# Patient Record
Sex: FEMALE | Race: WHITE | NOT HISPANIC OR LATINO | Employment: OTHER | URBAN - METROPOLITAN AREA
[De-identification: names, ages, dates, MRNs, and addresses within clinical notes are randomized per-mention and may not be internally consistent; named-entity substitution may affect disease eponyms.]

---

## 2017-01-31 ENCOUNTER — RESULTS ENCOUNTER (OUTPATIENT)
Dept: INTERNAL MEDICINE | Facility: CLINIC | Age: 67
End: 2017-01-31

## 2017-01-31 DIAGNOSIS — E78.00 HYPERCHOLESTEROLEMIA: ICD-10-CM

## 2017-02-04 ENCOUNTER — HOSPITAL ENCOUNTER (EMERGENCY)
Facility: HOSPITAL | Age: 67
Discharge: HOME | End: 2017-02-04
Attending: EMERGENCY MEDICINE | Admitting: EMERGENCY MEDICINE

## 2017-02-04 ENCOUNTER — RESULTS ENCOUNTER (OUTPATIENT)
Dept: INTERNAL MEDICINE | Facility: CLINIC | Age: 67
End: 2017-02-04

## 2017-02-04 ENCOUNTER — APPOINTMENT (OUTPATIENT)
Dept: CT IMAGING | Facility: HOSPITAL | Age: 67
End: 2017-02-04

## 2017-02-04 VITALS
DIASTOLIC BLOOD PRESSURE: 107 MMHG | OXYGEN SATURATION: 99 % | BODY MASS INDEX: 25.71 KG/M2 | HEIGHT: 66 IN | HEART RATE: 69 BPM | WEIGHT: 160 LBS | TEMPERATURE: 98.3 F | RESPIRATION RATE: 18 BRPM | SYSTOLIC BLOOD PRESSURE: 168 MMHG

## 2017-02-04 DIAGNOSIS — K57.32 DIVERTICULITIS LARGE INTESTINE W/O PERFORATION OR ABSCESS W/O BLEEDING: Primary | ICD-10-CM

## 2017-02-04 DIAGNOSIS — Z00.00 HEALTH CARE MAINTENANCE: ICD-10-CM

## 2017-02-04 DIAGNOSIS — K57.92 DIVERTICULITIS OF INTESTINE WITHOUT PERFORATION OR ABSCESS WITHOUT BLEEDING, UNSPECIFIED PART OF INTESTINAL TRACT: ICD-10-CM

## 2017-02-04 LAB
ALBUMIN SERPL-MCNC: 4.6 G/DL (ref 3.5–5.2)
ALBUMIN/GLOB SERPL: 1.8 G/DL
ALP SERPL-CCNC: 67 U/L (ref 39–117)
ALT SERPL W P-5'-P-CCNC: 50 U/L (ref 1–33)
ANION GAP SERPL CALCULATED.3IONS-SCNC: 12.4 MMOL/L
AST SERPL-CCNC: 34 U/L (ref 1–32)
BASOPHILS # BLD AUTO: 0.03 10*3/MM3 (ref 0–0.2)
BASOPHILS NFR BLD AUTO: 0.4 % (ref 0–1.5)
BILIRUB SERPL-MCNC: 0.5 MG/DL (ref 0.1–1.2)
BILIRUB UR QL STRIP: NEGATIVE
BUN BLD-MCNC: 10 MG/DL (ref 8–23)
BUN/CREAT SERPL: 13.7 (ref 7–25)
CALCIUM SPEC-SCNC: 9.6 MG/DL (ref 8.6–10.5)
CHLORIDE SERPL-SCNC: 103 MMOL/L (ref 98–107)
CLARITY UR: CLEAR
CO2 SERPL-SCNC: 26.6 MMOL/L (ref 22–29)
COLOR UR: YELLOW
CREAT BLD-MCNC: 0.73 MG/DL (ref 0.57–1)
DEPRECATED RDW RBC AUTO: 42 FL (ref 37–54)
EOSINOPHIL # BLD AUTO: 0.17 10*3/MM3 (ref 0–0.7)
EOSINOPHIL NFR BLD AUTO: 2.2 % (ref 0.3–6.2)
ERYTHROCYTE [DISTWIDTH] IN BLOOD BY AUTOMATED COUNT: 13.3 % (ref 11.7–13)
GFR SERPL CREATININE-BSD FRML MDRD: 80 ML/MIN/1.73
GLOBULIN UR ELPH-MCNC: 2.6 GM/DL
GLUCOSE BLD-MCNC: 102 MG/DL (ref 65–99)
GLUCOSE UR STRIP-MCNC: NEGATIVE MG/DL
HCT VFR BLD AUTO: 42.4 % (ref 35.6–45.5)
HGB BLD-MCNC: 14.7 G/DL (ref 11.9–15.5)
HGB UR QL STRIP.AUTO: NEGATIVE
IMM GRANULOCYTES # BLD: 0.02 10*3/MM3 (ref 0–0.03)
IMM GRANULOCYTES NFR BLD: 0.3 % (ref 0–0.5)
KETONES UR QL STRIP: NEGATIVE
LEUKOCYTE ESTERASE UR QL STRIP.AUTO: NEGATIVE
LIPASE SERPL-CCNC: 30 U/L (ref 13–60)
LYMPHOCYTES # BLD AUTO: 1.69 10*3/MM3 (ref 0.9–4.8)
LYMPHOCYTES NFR BLD AUTO: 21.8 % (ref 19.6–45.3)
MCH RBC QN AUTO: 29.8 PG (ref 26.9–32)
MCHC RBC AUTO-ENTMCNC: 34.7 G/DL (ref 32.4–36.3)
MCV RBC AUTO: 86 FL (ref 80.5–98.2)
MONOCYTES # BLD AUTO: 0.49 10*3/MM3 (ref 0.2–1.2)
MONOCYTES NFR BLD AUTO: 6.3 % (ref 5–12)
NEUTROPHILS # BLD AUTO: 5.35 10*3/MM3 (ref 1.9–8.1)
NEUTROPHILS NFR BLD AUTO: 69 % (ref 42.7–76)
NITRITE UR QL STRIP: NEGATIVE
PH UR STRIP.AUTO: 7 [PH] (ref 5–8)
PLATELET # BLD AUTO: 264 10*3/MM3 (ref 140–500)
PMV BLD AUTO: 10 FL (ref 6–12)
POTASSIUM BLD-SCNC: 3.5 MMOL/L (ref 3.5–5.2)
PROT SERPL-MCNC: 7.2 G/DL (ref 6–8.5)
PROT UR QL STRIP: NEGATIVE
RBC # BLD AUTO: 4.93 10*6/MM3 (ref 3.9–5.2)
SODIUM BLD-SCNC: 142 MMOL/L (ref 136–145)
SP GR UR STRIP: 1.01 (ref 1–1.03)
UROBILINOGEN UR QL STRIP: NORMAL
WBC NRBC COR # BLD: 7.75 10*3/MM3 (ref 4.5–10.7)

## 2017-02-04 PROCEDURE — 80053 COMPREHEN METABOLIC PANEL: CPT | Performed by: EMERGENCY MEDICINE

## 2017-02-04 PROCEDURE — 96361 HYDRATE IV INFUSION ADD-ON: CPT

## 2017-02-04 PROCEDURE — 96360 HYDRATION IV INFUSION INIT: CPT

## 2017-02-04 PROCEDURE — 74177 CT ABD & PELVIS W/CONTRAST: CPT

## 2017-02-04 PROCEDURE — 83690 ASSAY OF LIPASE: CPT | Performed by: EMERGENCY MEDICINE

## 2017-02-04 PROCEDURE — 99284 EMERGENCY DEPT VISIT MOD MDM: CPT

## 2017-02-04 PROCEDURE — 81003 URINALYSIS AUTO W/O SCOPE: CPT | Performed by: EMERGENCY MEDICINE

## 2017-02-04 PROCEDURE — 85025 COMPLETE CBC W/AUTO DIFF WBC: CPT | Performed by: EMERGENCY MEDICINE

## 2017-02-04 PROCEDURE — 0 IOPAMIDOL 61 % SOLUTION: Performed by: EMERGENCY MEDICINE

## 2017-02-04 RX ORDER — SODIUM CHLORIDE 0.9 % (FLUSH) 0.9 %
10 SYRINGE (ML) INJECTION AS NEEDED
Status: DISCONTINUED | OUTPATIENT
Start: 2017-02-04 | End: 2017-02-04 | Stop reason: HOSPADM

## 2017-02-04 RX ORDER — METRONIDAZOLE 500 MG/1
TABLET ORAL
Qty: 12 TABLET | Refills: 0 | Status: SHIPPED | OUTPATIENT
Start: 2017-02-04 | End: 2017-02-07 | Stop reason: ALTCHOICE

## 2017-02-04 RX ORDER — CIPROFLOXACIN 500 MG/1
TABLET, FILM COATED ORAL
Qty: 6 TABLET | Refills: 0 | Status: SHIPPED | OUTPATIENT
Start: 2017-02-04 | End: 2017-02-07 | Stop reason: SDUPTHER

## 2017-02-04 RX ORDER — SODIUM CHLORIDE 9 MG/ML
125 INJECTION, SOLUTION INTRAVENOUS CONTINUOUS
Status: DISCONTINUED | OUTPATIENT
Start: 2017-02-04 | End: 2017-02-04 | Stop reason: HOSPADM

## 2017-02-04 RX ORDER — DICYCLOMINE HCL 20 MG
20 TABLET ORAL EVERY 6 HOURS PRN
Qty: 16 TABLET | Refills: 0 | Status: SHIPPED | OUTPATIENT
Start: 2017-02-04 | End: 2017-03-07

## 2017-02-04 RX ADMIN — SODIUM CHLORIDE 125 ML/HR: 9 INJECTION, SOLUTION INTRAVENOUS at 12:11

## 2017-02-04 RX ADMIN — SODIUM CHLORIDE 500 ML: 9 INJECTION, SOLUTION INTRAVENOUS at 12:06

## 2017-02-04 RX ADMIN — IOPAMIDOL 85 ML: 612 INJECTION, SOLUTION INTRAVENOUS at 12:51

## 2017-02-04 NOTE — ED NOTES
"Patient states she has abdominal pain, feels like \"burning\" that started last Sunday. Patient was seen by a Physician and received antibiotics but her pain has not improved, and she does have a history of diverticulitis.      Keira Ha RN  02/04/17 1110    "

## 2017-02-04 NOTE — ED PROVIDER NOTES
" EMERGENCY DEPARTMENT ENCOUNTER    CHIEF COMPLAINT  Chief Complaint: abdominal pain  History given by: patient  History limited by: nothing  Room Number: 21/21  PMD: RACHELE Espinoza      HPI:  Pt is a 66 y.o. female who presents complaining of constant, diffuse, \"burning\" abdominal pain onset ~1 week ago. Pt states she was seen by urgent care at the onset of her sx and was given abx that provided minimal relief. Pt states her pain is worse with eating. Pt also c/o dizziness, lightheadedness, nausea, and generalized myalgias. Pt denies vomiting, diarrhea, and fever. Hx of diverticulitis. No PSHx.     Duration:  1 week  Onset: gradual  Timing: constant  Location: diffuse, R greater than L  Radiation: none  Quality: \"burning\"  Intensity/Severity: moderate  Progression: unchanged  Associated Symptoms: dizziness, lightheadedness, nausea, generalized myalgias.   Aggravating Factors: eating  Alleviating Factors: none  Previous Episodes: yes  Treatment before arrival: cipro, flagyl    PAST MEDICAL HISTORY  Active Ambulatory Problems     Diagnosis Date Noted   • Benign essential hypertension 03/14/2016   • Disc disorder of cervical region 03/14/2016   • Degeneration of intervertebral disc of lumbar region 03/14/2016   • Diverticulosis of intestine 03/14/2016   • Steatosis of liver 03/14/2016   • Fibrocystic breast changes 03/14/2016   • Hypercholesterolemia 03/14/2016   • Osteoarthritis of knee 03/14/2016   • Osteopenia 03/14/2016   • Impaired fasting blood sugar 03/14/2016   • Scoliosis 03/14/2016   • Lipoma of left lower extremity 08/04/2016   • Health care maintenance 08/04/2016   • Diverticulitis of large intestine 08/19/2016     Resolved Ambulatory Problems     Diagnosis Date Noted   • Bone deformity 03/14/2016     Past Medical History   Diagnosis Date   • Anxiety    • Diverticulitis    • Hyperlipidemia    • Hypertension    • Lateral epicondylitis of right elbow    • Pancreatic cyst    • " "Post-menopausal    • Pregnancy    • Uterine leiomyoma        PAST SURGICAL HISTORY  Past Surgical History   Procedure Laterality Date   • Colonoscopy  06/14/2011            FAMILY HISTORY  Family History   Problem Relation Age of Onset   • Hypertension Mother    • Osteoarthritis Mother    • Breast cancer Mother      in situ   • Stroke Mother    • Diabetes Mother    • Heart attack Father 38   • Coronary artery disease Father        SOCIAL HISTORY  Social History     Social History   • Marital status: Unknown     Spouse name: N/A   • Number of children: N/A   • Years of education: N/A     Occupational History   • Not on file.     Social History Main Topics   • Smoking status: Former Smoker     Packs/day: 1.00     Years: 10.00     Types: Cigarettes     Quit date: 1971   • Smokeless tobacco: Not on file   • Alcohol use Yes      Comment: 4 drinks a week    • Drug use: Not on file   • Sexual activity: Not on file     Other Topics Concern   • Not on file     Social History Narrative       ALLERGIES  Amoxicillin-pot clavulanate    REVIEW OF SYSTEMS  Review of Systems   Constitutional: Negative for chills and fatigue.   HENT: Negative for congestion, rhinorrhea and sore throat.    Eyes: Negative for pain.   Respiratory: Negative for cough, shortness of breath and wheezing.    Cardiovascular: Negative for chest pain, palpitations and leg swelling.   Gastrointestinal: Positive for abdominal pain (\"burning\") and nausea. Negative for diarrhea and vomiting.   Genitourinary: Negative for difficulty urinating, dysuria, flank pain and frequency.   Musculoskeletal: Positive for myalgias (generalized). Negative for arthralgias, neck pain and neck stiffness.   Skin: Negative for rash.   Neurological: Positive for dizziness and light-headedness. Negative for speech difficulty, weakness, numbness and headaches.   Psychiatric/Behavioral: Negative.    All other systems reviewed and are negative.      PHYSICAL EXAM  ED Triage Vitals "   Temp Heart Rate Resp BP SpO2   02/04/17 1109 02/04/17 1109 -- -- 02/04/17 1109   97.3 °F (36.3 °C) 88   98 %      Temp src Heart Rate Source Patient Position BP Location FiO2 (%)   02/04/17 1109 02/04/17 1109 -- -- --   Tympanic Monitor          Physical Exam   Constitutional: She is oriented to person, place, and time and well-developed, well-nourished, and in no distress. No distress.   HENT:   Head: Normocephalic and atraumatic.   Eyes: EOM are normal. Pupils are equal, round, and reactive to light.   Neck: Normal range of motion. Neck supple.   Cardiovascular: Normal rate, regular rhythm and normal heart sounds.    Pulmonary/Chest: Effort normal and breath sounds normal. No respiratory distress.   Abdominal: Soft. There is generalized tenderness (mild). There is no rebound and no guarding.   Musculoskeletal: Normal range of motion. She exhibits no edema.   Neurological: She is alert and oriented to person, place, and time. She has normal sensation and normal strength.   Skin: Skin is warm and dry. No rash noted.   Psychiatric: Mood and affect normal.   Nursing note and vitals reviewed.      LAB RESULTS  Lab Results (last 24 hours)     Procedure Component Value Units Date/Time    Urinalysis With / Culture If Indicated [[id number]]  (Normal) Collected:  02/04/17 1154    Specimen:  Urine from Urine, Clean Catch Updated:  02/04/17 1212     Color, UA Yellow      Appearance, UA Clear      pH, UA 7.0      Specific Gravity, UA 1.008      Glucose, UA Negative      Ketones, UA Negative      Bilirubin, UA Negative      Blood, UA Negative      Protein, UA Negative      Leuk Esterase, UA Negative      Nitrite, UA Negative      Urobilinogen, UA 0.2 E.U./dL     Narrative:       Urine microscopic not indicated.    CBC & Differential [[id number]] Collected:  02/04/17 1206    Specimen:  Blood Updated:  02/04/17 1214    Narrative:       The following orders were created for panel order CBC & Differential.  Procedure                                Abnormality         Status                     ---------                               -----------         ------                     CBC Auto Differential[[id number]]         Abnormal            Final result                 Please view results for these tests on the individual orders.    Comprehensive Metabolic Panel [[id number]]  (Abnormal) Collected:  02/04/17 1206    Specimen:  Blood Updated:  02/04/17 1241     Glucose 102 (H) mg/dL      BUN 10 mg/dL      Creatinine 0.73 mg/dL      Sodium 142 mmol/L      Potassium 3.5 mmol/L      Chloride 103 mmol/L      CO2 26.6 mmol/L      Calcium 9.6 mg/dL      Total Protein 7.2 g/dL      Albumin 4.60 g/dL      ALT (SGPT) 50 (H) U/L      AST (SGOT) 34 (H) U/L      Alkaline Phosphatase 67 U/L      Total Bilirubin 0.5 mg/dL      eGFR Non African Amer 80 mL/min/1.73      Globulin 2.6 gm/dL      A/G Ratio 1.8 g/dL      BUN/Creatinine Ratio 13.7      Anion Gap 12.4 mmol/L     Lipase [[id number]]  (Normal) Collected:  02/04/17 1206    Specimen:  Blood Updated:  02/04/17 1241     Lipase 30 U/L     CBC Auto Differential [[id number]]  (Abnormal) Collected:  02/04/17 1206    Specimen:  Blood Updated:  02/04/17 1214     WBC 7.75 10*3/mm3      RBC 4.93 10*6/mm3      Hemoglobin 14.7 g/dL      Hematocrit 42.4 %      MCV 86.0 fL      MCH 29.8 pg      MCHC 34.7 g/dL      RDW 13.3 (H) %      RDW-SD 42.0 fl      MPV 10.0 fL      Platelets 264 10*3/mm3      Neutrophil % 69.0 %      Lymphocyte % 21.8 %      Monocyte % 6.3 %      Eosinophil % 2.2 %      Basophil % 0.4 %      Immature Grans % 0.3 %      Neutrophils, Absolute 5.35 10*3/mm3      Lymphocytes, Absolute 1.69 10*3/mm3      Monocytes, Absolute 0.49 10*3/mm3      Eosinophils, Absolute 0.17 10*3/mm3      Basophils, Absolute 0.03 10*3/mm3      Immature Grans, Absolute 0.02 10*3/mm3       I ordered the above labs and reviewed the results    RADIOLOGY  CT Abdomen Pelvis With Contrast   Preliminary Result   1. There is extensive sigmoid  diverticulosis. There is a single   thickened diverticulum which may represent resolving diverticulitis. No   acute bowel abnormality is otherwise seen. There is no evidence for   appendicitis.   2. Lobular uterus likely secondary to small fibroids.       Discussed with Dr. Nolan.          I ordered the above noted radiological studies. Interpreted by radiologist. Discussed with radiologist (Dr. Sykes). Reviewed by me in PACS.       PROCEDURES  Procedures      PROGRESS AND CONSULTS  ED Course     11:21 AM  Ordered CBC, CMP, Lipase, UA, CT abd/pelvis for further evaluation.     1:29 PM  Pt rechecked and is resting comfortably. All pertinent lab/imaging/EKG findings discussed including diverticulosis and diverticulum seen on CT. Discussed with Pt that her sx probably have been improved with her sbx as there is only one diverticulum left. Pt/family verbalized understanding and agree with plan to discharge with further abx. Advised Pt to follow up with PCP if sx persist. All questions and concerns addressed at this time.         MEDICAL DECISION MAKING  Results were reviewed/discussed with the patient and they were also made aware of online access. Pt also made aware that some labs, such as cultures, will not be resulted during ER visit and follow up with PMD is necessary.     MDM  Number of Diagnoses or Management Options  Diverticulitis large intestine w/o perforation or abscess w/o bleeding:   Diagnosis management comments: Patient complained of abdominal pain for the past one week.  She was started on Cipro and Flagyl for suspected diverticulitis about 6 days ago.  On exam, she had mild diffuse tenderness but no rebound or guarding.  She was afebrile with a normal white blood cell count.  CT the abdomen and pelvis showed diverticulosis with a single diverticulum that had some wall thickening but no surrounding inflammatory changes.  This would be consistent with a resolving diverticulitis.  Patient was given  antibiotics for 1 week.  I will give her an additional 3 days of Cipro and Flagyl for a total treatment time of 10 days.       Amount and/or Complexity of Data Reviewed  Clinical lab tests: ordered and reviewed  Tests in the radiology section of CPT®: ordered and reviewed (CT abd/pelvis: diverticulosis and a single diverticulum with mild inflammation with no surrounding inflammation. Appendix normal.)  Discussion of test results with the performing providers: yes (Dr. Sykes)  Decide to obtain previous medical records or to obtain history from someone other than the patient: yes  Review and summarize past medical records: yes (Pt was seen at urgent care 1/29/17 c/o abdominal pain and dx with suspected diveriticulitis and given rx for cipro and flagyl. )  Independent visualization of images, tracings, or specimens: yes    Patient Progress  Patient progress: stable         DIAGNOSIS  Final diagnoses:   Diverticulitis large intestine w/o perforation or abscess w/o bleeding       DISPOSITION  Disposition: Discharged.    Patient discharged in stable condition.    Reviewed implications of results, diagnosis, meds, responsibility to follow up, warning signs and symptoms of possible worsening, potential complications and reasons to return to ER.    Patient/Family voiced understanding of above instructions.    Discussed plan for discharge, as there is no emergent indication for admission.  Pt/family is agreeable and understands need for follow up and repeat testing.  Pt is aware that discharge does not mean that nothing is wrong but it indicates no emergency is present and they must continue care with follow-up as given below or physician of their choice.     FOLLOW-UP  Janiya Allen, APRN  [address] [facility] [address] [phone]    Call  As needed, If symptoms worsen         Medication List      New Prescriptions          dicyclomine 20 MG tablet   Commonly known as:  BENTYL   Take 1 tablet by  mouth Every 6 (Six) Hours As Needed (abdominal cramping).           Changed          metroNIDAZOLE 500 MG tablet   Commonly known as:  FLAGYL   1 PO Q6 HOURS   What changed:  Another medication with the same name was removed. Continue   taking this medication, and follow the directions you see here.         Stop          etodolac 400 MG tablet   Commonly known as:  LODINE       levoFLOXacin 500 MG tablet   Commonly known as:  LEVAQUIN       MethylPREDNISolone 4 MG tablet   Commonly known as:  MEDROL (VALDO)             Latest Documented Vital Signs:  As of 4:19 PM  BP- (!) 168/107 HR- 69 Temp- 98.3 °F (36.8 °C) (Tympanic) O2 sat- 99%    --  Documentation assistance provided by dori Matias for Dr. Ovidio MD.  Information recorded by the scribe was done at my direction and has been verified and validated by me.               Tati Matias  02/04/17 9039       Vamsi Nolan MD  02/04/17 6924       Vamsi Nolan MD  02/04/17 7496

## 2017-02-04 NOTE — DISCHARGE INSTRUCTIONS
Continue taking your antibiotics for another 3 days as prescribed.  Drink plenty of fluids.  Follow-up with your doctor next week if symptoms have not resolved.  Return to emergency department for worsening abdominal pain, fever, vomiting, or other concern.

## 2017-02-07 ENCOUNTER — OFFICE VISIT (OUTPATIENT)
Dept: INTERNAL MEDICINE | Facility: CLINIC | Age: 67
End: 2017-02-07

## 2017-02-07 VITALS
BODY MASS INDEX: 26.2 KG/M2 | WEIGHT: 163 LBS | HEIGHT: 66 IN | DIASTOLIC BLOOD PRESSURE: 62 MMHG | SYSTOLIC BLOOD PRESSURE: 102 MMHG

## 2017-02-07 DIAGNOSIS — K57.92 DIVERTICULITIS OF INTESTINE WITHOUT PERFORATION OR ABSCESS WITHOUT BLEEDING, UNSPECIFIED PART OF INTESTINAL TRACT: ICD-10-CM

## 2017-02-07 DIAGNOSIS — I79.8 OTHER DISORDERS OF ARTERIES, ARTERIOLES AND CAPILLARIES IN DISEASES CLASSIFIED ELSEWHERE (HCC): ICD-10-CM

## 2017-02-07 DIAGNOSIS — K57.32 DIVERTICULITIS OF LARGE INTESTINE WITHOUT PERFORATION OR ABSCESS WITHOUT BLEEDING: Primary | ICD-10-CM

## 2017-02-07 DIAGNOSIS — I70.0 AORTIC ATHEROSCLEROSIS (HCC): ICD-10-CM

## 2017-02-07 DIAGNOSIS — E78.00 HYPERCHOLESTEROLEMIA: ICD-10-CM

## 2017-02-07 PROCEDURE — 99214 OFFICE O/P EST MOD 30 MIN: CPT | Performed by: INTERNAL MEDICINE

## 2017-02-07 RX ORDER — METRONIDAZOLE 500 MG/1
500 TABLET ORAL 3 TIMES DAILY
Qty: 21 TABLET | Refills: 0 | Status: SHIPPED | OUTPATIENT
Start: 2017-02-07 | End: 2017-02-17

## 2017-02-07 RX ORDER — CIPROFLOXACIN 500 MG/1
TABLET, FILM COATED ORAL
Qty: 14 TABLET | Refills: 0 | Status: SHIPPED | OUTPATIENT
Start: 2017-02-07 | End: 2017-02-17

## 2017-02-07 RX ORDER — ATORVASTATIN CALCIUM 20 MG/1
20 TABLET, FILM COATED ORAL DAILY
Qty: 30 TABLET | Refills: 11 | Status: SHIPPED | OUTPATIENT
Start: 2017-02-07 | End: 2017-03-28 | Stop reason: SDUPTHER

## 2017-02-07 NOTE — PROGRESS NOTES
Subjective   Jana Page is a 66 y.o. female. ED visit f/u    History of Present Illness   Jana Page  presents for [facility] emergency department visit f/u. Patient had to seek treatment at the mentioned above facility for the abdominal pain. Symptoms started a week prior and brought her to [facility], where patient was diagnosed with dieverticulitis and terated with Cipro and Metronidazole.. Jana Page was diagnosed with diverticulitis. Tests and procedures done: CT scan and blood tests. Treatments: IVFs. Patient was discharged home. Medication prescribed: extended duration of Cipro and Metronidazoele..  Patient reports that at home condition somewhat improved. Patient had been compliant with recommendations and medication.  Records reviewed and discussed with patient. Patient has no nausea, no diarrhea, no melena or blood in stool. Feels a bit tiered. Patient had CT scan, that had showed atherosclerotic changes in aorta. She is on Simvastatin for hyperlipidemia, has no h/o DM, her HTN is well controlled. Non smoker. Has strong FH of premature CAD and carotid artery disease in her mother.  The following portions of the patient's history were reviewed and updated as appropriate: allergies, current medications, past family history, past medical history, past social history, past surgical history and problem list.    Review of Systems   Constitutional: Negative for chills and fever.   Eyes: Negative for pain and redness.   Respiratory: Negative for cough and shortness of breath.    Cardiovascular: Negative for chest pain and leg swelling.   Gastrointestinal: Positive for abdominal pain and nausea.   Neurological: Negative for dizziness and headaches.       Objective   Physical Exam   Constitutional: She is oriented to person, place, and time. She appears well-developed and well-nourished.   HENT:   Head: Normocephalic and atraumatic.   Right Ear: Tympanic membrane, external ear and ear canal normal.   Left  Ear: Tympanic membrane, external ear and ear canal normal.   Nose: Nose normal. Right sinus exhibits no maxillary sinus tenderness and no frontal sinus tenderness. Left sinus exhibits no maxillary sinus tenderness and no frontal sinus tenderness.   Mouth/Throat: Uvula is midline, oropharynx is clear and moist and mucous membranes are normal.   Eyes: Conjunctivae and EOM are normal. Pupils are equal, round, and reactive to light. Right eye exhibits no discharge. Left eye exhibits no discharge. No scleral icterus.   Neck: Neck supple. No JVD present.   Cardiovascular: Normal rate, regular rhythm and normal heart sounds.  Exam reveals no gallop and no friction rub.    No murmur heard.  Pulmonary/Chest: Effort normal and breath sounds normal. She has no wheezes. She has no rales.   Abdominal: Soft. She exhibits no distension. There is tenderness (very minimal RLQ tenderness).   Musculoskeletal: She exhibits no edema.   Lymphadenopathy:     She has no cervical adenopathy.   Neurological: She is alert and oriented to person, place, and time. No cranial nerve deficit.   Skin: Skin is warm and dry. No rash noted.   Psychiatric: She has a normal mood and affect. Her behavior is normal.   Vitals reviewed.      Assessment/Plan   Diagnoses and all orders for this visit:    Diverticulitis of large intestine without perforation or abscess without bleeding    Aortic atherosclerosis    Hypercholesterolemia    1. Diverticulitis - diet discussed with patient. Will continue Cipro twice a day and Metronidazole 3 times a day for another 5 or 7 days.  2. Aortic atherosclerosis - will try to be more aggressive in her hyperlipidemia treatment and start ASA 81 mg a day when he diverticulitis resolved. Will check Carotid US.  3. Hyperlipidemia- in a past well controlled with Simvastatin.Target LDL is below 70 due to the recent discovery of atherosclerosis. Will change Simvastatin to Atorvastatin 20 mg a day.

## 2017-02-10 ENCOUNTER — HOSPITAL ENCOUNTER (OUTPATIENT)
Dept: CARDIOLOGY | Facility: HOSPITAL | Age: 67
Discharge: HOME | End: 2017-02-10
Admitting: INTERNAL MEDICINE

## 2017-02-10 ENCOUNTER — TELEPHONE (OUTPATIENT)
Dept: INTERNAL MEDICINE | Facility: CLINIC | Age: 67
End: 2017-02-10

## 2017-02-10 ENCOUNTER — OFFICE VISIT (OUTPATIENT)
Dept: INTERNAL MEDICINE | Facility: CLINIC | Age: 67
End: 2017-02-10

## 2017-02-10 VITALS
WEIGHT: 160 LBS | BODY MASS INDEX: 25.71 KG/M2 | HEIGHT: 66 IN | DIASTOLIC BLOOD PRESSURE: 70 MMHG | TEMPERATURE: 98.5 F | SYSTOLIC BLOOD PRESSURE: 122 MMHG

## 2017-02-10 DIAGNOSIS — R42 DIZZINESS: Primary | ICD-10-CM

## 2017-02-10 DIAGNOSIS — I79.8 OTHER DISORDERS OF ARTERIES, ARTERIOLES AND CAPILLARIES IN DISEASES CLASSIFIED ELSEWHERE (HCC): ICD-10-CM

## 2017-02-10 DIAGNOSIS — I70.0 AORTIC ATHEROSCLEROSIS (HCC): ICD-10-CM

## 2017-02-10 LAB
ALBUMIN SERPL-MCNC: 4.6 G/DL (ref 3.5–5.2)
ALBUMIN/GLOB SERPL: 1.9 G/DL
ALP SERPL-CCNC: 60 U/L (ref 39–117)
ALT SERPL-CCNC: 31 U/L (ref 1–33)
AST SERPL-CCNC: 22 U/L (ref 1–32)
BILIRUB SERPL-MCNC: 0.3 MG/DL (ref 0.1–1.2)
BUN SERPL-MCNC: 12 MG/DL (ref 8–23)
BUN/CREAT SERPL: 16.9 (ref 7–25)
CALCIUM SERPL-MCNC: 9.8 MG/DL (ref 8.6–10.5)
CHLORIDE SERPL-SCNC: 101 MMOL/L (ref 98–107)
CO2 SERPL-SCNC: 22.9 MMOL/L (ref 22–29)
CREAT SERPL-MCNC: 0.71 MG/DL (ref 0.57–1)
GLOBULIN SER CALC-MCNC: 2.4 GM/DL
GLUCOSE SERPL-MCNC: 119 MG/DL (ref 65–99)
POTASSIUM SERPL-SCNC: 3.4 MMOL/L (ref 3.5–5.2)
PROT SERPL-MCNC: 7 G/DL (ref 6–8.5)
SODIUM SERPL-SCNC: 141 MMOL/L (ref 136–145)

## 2017-02-10 PROCEDURE — 99213 OFFICE O/P EST LOW 20 MIN: CPT | Performed by: INTERNAL MEDICINE

## 2017-02-10 PROCEDURE — 93880 EXTRACRANIAL BILAT STUDY: CPT

## 2017-02-10 NOTE — TELEPHONE ENCOUNTER
----- Message from Izzy Snatillan sent at 2/10/2017  8:15 AM EST -----  Contact: pt - Dr Newberry's pt - RE: med's  Pt call back again to inform that pt is still having dizzy spells. Pt informs that pt will stop med's and is feeling a little better. Pt on med's for 11 days for diverticulitis. Pt is having tests day & Monday.Could you please call pt to discuss? Please advise. 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

## 2017-02-10 NOTE — PROGRESS NOTES
"Subjective   Jana Page is a 66 y.o. female. C/o dizziness    History of Present Illness   Patient c/o feeling dizzy. Symptoms started 1 week ago. Patient describes symptoms as lightheadedness. Patient had multiple episodes, today pretty much off and on all day. Patient had been treated for diverticulitis with Metronidazole and Cipro for the last 10 days or so, and had been attributing her s-ms to the medication side effects. Not sure if had any dizziness when had diverticulitis in a past.Patient reports that no particular movement, activity or position triggers symptoms.   Jana Page denies  changes in hearing associated with dizzy spells. Denies chest pain or pressure, shortness of breath or palpitations. Patient denies  nausea.  Pertinent PMH includes aortic atherosclerosis.    Visit Vitals   • /70   • Temp 98.5 °F (36.9 °C)   • Ht 66\" (167.6 cm)   • Wt 160 lb (72.6 kg)   • BMI 25.82 kg/m2         Current Outpatient Prescriptions:   •  atorvastatin (LIPITOR) 20 MG tablet, Take 1 tablet by mouth Daily., Disp: 30 tablet, Rfl: 11  •  Calcium Carb-Cholecalciferol 600-200 MG-UNIT tablet, Take  by mouth., Disp: , Rfl:   •  Cholecalciferol (VITAMIN D) 1000 UNITS tablet, Take  by mouth., Disp: , Rfl:   •  Coenzyme Q10 (CO Q 10) 10 MG capsule, Take  by mouth., Disp: , Rfl:   •  dicyclomine (BENTYL) 20 MG tablet, Take 1 tablet by mouth Every 6 (Six) Hours As Needed (abdominal cramping)., Disp: 16 tablet, Rfl: 0  •  etodolac (LODINE) 400 MG tablet, Take 1 tablet by mouth 2 (two) times a day., Disp: 30 tablet, Rfl: 0  •  fluticasone (FLONASE) 50 MCG/ACT nasal spray, 2 sprays into each nostril daily. Administer 2 sprays in each nostril for each dose., Disp: , Rfl:   •  hydrochlorothiazide (MICROZIDE) 12.5 MG capsule, TAKE ONE CAPSULE BY MOUTH DAILY, Disp: 30 capsule, Rfl: 9  •  lisinopril (PRINIVIL,ZESTRIL) 40 MG tablet, TAKE ONE TABLET BY MOUTH DAILY, Disp: 90 tablet, Rfl: 3  •  metroNIDAZOLE (FLAGYL) 500 " MG tablet, Take 1 tablet by mouth 3 (Three) Times a Day., Disp: 21 tablet, Rfl: 0  •  Misc Natural Products (OSTEO BI-FLEX JOINT SHIELD) tablet, Take  by mouth., Disp: , Rfl:   •  Multiple Vitamin (MULTIVITAMIN) capsule, Take  by mouth., Disp: , Rfl:   •  Probiotic Product (PRO-BIOTIC BLEND) capsule, Take  by mouth., Disp: , Rfl:   •  ciprofloxacin (CIPRO) 500 MG tablet, 1 po q12, Disp: 14 tablet, Rfl: 0  The following portions of the patient's history were reviewed and updated as appropriate: allergies, current medications, past family history, past medical history, past social history, past surgical history and problem list.    Review of Systems   Constitutional: Negative for chills and fever.   Eyes: Negative for pain and redness.   Respiratory: Negative for cough and shortness of breath.    Cardiovascular: Negative for chest pain and leg swelling.   Gastrointestinal: Diarrhea: this am, overall stools had been loose this  last 2 weeks.   Neurological: Positive for dizziness and headaches.       Objective   Physical Exam   Constitutional: She is oriented to person, place, and time. She appears well-developed.   HENT:   Head: Normocephalic and atraumatic.   Right Ear: Tympanic membrane, external ear and ear canal normal.   Left Ear: Tympanic membrane, external ear and ear canal normal.   Nose: Nose normal. Right sinus exhibits no maxillary sinus tenderness and no frontal sinus tenderness. Left sinus exhibits no maxillary sinus tenderness and no frontal sinus tenderness.   Mouth/Throat: Uvula is midline, oropharynx is clear and moist and mucous membranes are normal.   Eyes: Conjunctivae and EOM are normal. Pupils are equal, round, and reactive to light. Right eye exhibits no discharge. Left eye exhibits no discharge. No scleral icterus.   Neck: Neck supple. No JVD present.   Cardiovascular: Normal rate, regular rhythm and normal heart sounds.  Exam reveals no gallop and no friction rub.    No murmur  heard.  Pulmonary/Chest: Effort normal and breath sounds normal. She has no wheezes. She has no rales.   Abdominal: Soft. Bowel sounds are normal. She exhibits no distension and no mass. There is no tenderness. There is no rebound and no guarding. No hernia.   Musculoskeletal: She exhibits no edema.   Lymphadenopathy:     She has no cervical adenopathy.   Neurological: She is alert and oriented to person, place, and time. No cranial nerve deficit.   Skin: Skin is warm and dry. No rash noted.   Psychiatric: She has a normal mood and affect. Her behavior is normal.   Vitals reviewed.      Assessment/Plan   Diagnoses and all orders for this visit:    Dizziness  -     Comprehensive Metabolic Panel; Future      Dizziness - most likely due to side effect of Ciprofloxacin vs Metronidazole vs electrolyte disturbance. Reassured, will check electrolytes.

## 2017-02-11 LAB
BH CV XLRA MEAS LEFT CCA RATIO VEL: 66.8 CM/SEC
BH CV XLRA MEAS LEFT DIST CCA EDV: 25.2 CM/SEC
BH CV XLRA MEAS LEFT DIST CCA PSV: 66.8 CM/SEC
BH CV XLRA MEAS LEFT DIST ICA EDV: -26.9 CM/SEC
BH CV XLRA MEAS LEFT DIST ICA PSV: -75.8 CM/SEC
BH CV XLRA MEAS LEFT ICA RATIO VEL: -99 CM/SEC
BH CV XLRA MEAS LEFT ICA/CCA RATIO: -1.5
BH CV XLRA MEAS LEFT MID ICA EDV: -34.6 CM/SEC
BH CV XLRA MEAS LEFT MID ICA PSV: -99 CM/SEC
BH CV XLRA MEAS LEFT PROX CCA EDV: 19.3 CM/SEC
BH CV XLRA MEAS LEFT PROX CCA PSV: 73.3 CM/SEC
BH CV XLRA MEAS LEFT PROX ECA EDV: -10 CM/SEC
BH CV XLRA MEAS LEFT PROX ECA PSV: -39.1 CM/SEC
BH CV XLRA MEAS LEFT PROX ICA EDV: 22.9 CM/SEC
BH CV XLRA MEAS LEFT PROX ICA PSV: 59.2 CM/SEC
BH CV XLRA MEAS LEFT PROX SCLA PSV: 101 CM/SEC
BH CV XLRA MEAS LEFT VERTEBRAL A EDV: 18.1 CM/SEC
BH CV XLRA MEAS LEFT VERTEBRAL A PSV: 43.6 CM/SEC
BH CV XLRA MEAS RIGHT CCA RATIO VEL: -67.4 CM/SEC
BH CV XLRA MEAS RIGHT DIST CCA EDV: -25.2 CM/SEC
BH CV XLRA MEAS RIGHT DIST CCA PSV: -67.4 CM/SEC
BH CV XLRA MEAS RIGHT DIST ICA EDV: -27.2 CM/SEC
BH CV XLRA MEAS RIGHT DIST ICA PSV: -62.6 CM/SEC
BH CV XLRA MEAS RIGHT ICA RATIO VEL: -62.6 CM/SEC
BH CV XLRA MEAS RIGHT ICA/CCA RATIO: 0.93
BH CV XLRA MEAS RIGHT MID ICA EDV: -27 CM/SEC
BH CV XLRA MEAS RIGHT MID ICA PSV: -59.2 CM/SEC
BH CV XLRA MEAS RIGHT PROX CCA EDV: 15.2 CM/SEC
BH CV XLRA MEAS RIGHT PROX CCA PSV: 68.6 CM/SEC
BH CV XLRA MEAS RIGHT PROX ECA EDV: -13.5 CM/SEC
BH CV XLRA MEAS RIGHT PROX ECA PSV: -54.6 CM/SEC
BH CV XLRA MEAS RIGHT PROX ICA EDV: 17.7 CM/SEC
BH CV XLRA MEAS RIGHT PROX ICA PSV: 60.5 CM/SEC
BH CV XLRA MEAS RIGHT PROX SCLA PSV: 77.4 CM/SEC
BH CV XLRA MEAS RIGHT VERTEBRAL A EDV: 13.4 CM/SEC
BH CV XLRA MEAS RIGHT VERTEBRAL A PSV: 42.8 CM/SEC
LEFT ARM BP: NORMAL MMHG
RIGHT ARM BP: NORMAL MMHG

## 2017-02-14 ENCOUNTER — APPOINTMENT (OUTPATIENT)
Dept: GENERAL RADIOLOGY | Facility: HOSPITAL | Age: 67
End: 2017-02-14

## 2017-02-14 LAB
ALBUMIN SERPL-MCNC: 4.6 G/DL (ref 3.5–5.2)
ALBUMIN/GLOB SERPL: 1.8 G/DL
ALP SERPL-CCNC: 60 U/L (ref 39–117)
ALT SERPL W P-5'-P-CCNC: 27 U/L (ref 1–33)
ANION GAP SERPL CALCULATED.3IONS-SCNC: 14.1 MMOL/L
AST SERPL-CCNC: 21 U/L (ref 1–32)
BACTERIA UR QL AUTO: ABNORMAL /HPF
BASOPHILS # BLD AUTO: 0.03 10*3/MM3 (ref 0–0.2)
BASOPHILS NFR BLD AUTO: 0.3 % (ref 0–1.5)
BILIRUB SERPL-MCNC: 0.5 MG/DL (ref 0.1–1.2)
BILIRUB UR QL STRIP: NEGATIVE
BUN BLD-MCNC: 10 MG/DL (ref 8–23)
BUN/CREAT SERPL: 14.5 (ref 7–25)
CALCIUM SPEC-SCNC: 9.6 MG/DL (ref 8.6–10.5)
CHLORIDE SERPL-SCNC: 101 MMOL/L (ref 98–107)
CLARITY UR: CLEAR
CO2 SERPL-SCNC: 25.9 MMOL/L (ref 22–29)
COLOR UR: YELLOW
CREAT BLD-MCNC: 0.69 MG/DL (ref 0.57–1)
DEPRECATED RDW RBC AUTO: 41.1 FL (ref 37–54)
EOSINOPHIL # BLD AUTO: 0.11 10*3/MM3 (ref 0–0.7)
EOSINOPHIL NFR BLD AUTO: 1.3 % (ref 0.3–6.2)
ERYTHROCYTE [DISTWIDTH] IN BLOOD BY AUTOMATED COUNT: 12.9 % (ref 11.7–13)
GFR SERPL CREATININE-BSD FRML MDRD: 85 ML/MIN/1.73
GLOBULIN UR ELPH-MCNC: 2.5 GM/DL
GLUCOSE BLD-MCNC: 112 MG/DL (ref 65–99)
GLUCOSE UR STRIP-MCNC: NEGATIVE MG/DL
HCT VFR BLD AUTO: 41.6 % (ref 35.6–45.5)
HGB BLD-MCNC: 14.6 G/DL (ref 11.9–15.5)
HGB UR QL STRIP.AUTO: ABNORMAL
HYALINE CASTS UR QL AUTO: ABNORMAL /LPF
IMM GRANULOCYTES # BLD: 0 10*3/MM3 (ref 0–0.03)
IMM GRANULOCYTES NFR BLD: 0 % (ref 0–0.5)
KETONES UR QL STRIP: NEGATIVE
LEUKOCYTE ESTERASE UR QL STRIP.AUTO: NEGATIVE
LYMPHOCYTES # BLD AUTO: 2.03 10*3/MM3 (ref 0.9–4.8)
LYMPHOCYTES NFR BLD AUTO: 23.1 % (ref 19.6–45.3)
MAGNESIUM SERPL-MCNC: 2.3 MG/DL (ref 1.6–2.4)
MCH RBC QN AUTO: 30.2 PG (ref 26.9–32)
MCHC RBC AUTO-ENTMCNC: 35.1 G/DL (ref 32.4–36.3)
MCV RBC AUTO: 86.1 FL (ref 80.5–98.2)
MONOCYTES # BLD AUTO: 0.65 10*3/MM3 (ref 0.2–1.2)
MONOCYTES NFR BLD AUTO: 7.4 % (ref 5–12)
NEUTROPHILS # BLD AUTO: 5.95 10*3/MM3 (ref 1.9–8.1)
NEUTROPHILS NFR BLD AUTO: 67.9 % (ref 42.7–76)
NITRITE UR QL STRIP: NEGATIVE
PH UR STRIP.AUTO: 7 [PH] (ref 5–8)
PLATELET # BLD AUTO: 307 10*3/MM3 (ref 140–500)
PMV BLD AUTO: 10.3 FL (ref 6–12)
POTASSIUM BLD-SCNC: 3.4 MMOL/L (ref 3.5–5.2)
PROT SERPL-MCNC: 7.1 G/DL (ref 6–8.5)
PROT UR QL STRIP: NEGATIVE
RBC # BLD AUTO: 4.83 10*6/MM3 (ref 3.9–5.2)
RBC # UR: ABNORMAL /HPF
REF LAB TEST METHOD: ABNORMAL
SODIUM BLD-SCNC: 141 MMOL/L (ref 136–145)
SP GR UR STRIP: <=1.005 (ref 1–1.03)
SQUAMOUS #/AREA URNS HPF: ABNORMAL /HPF
TROPONIN T SERPL-MCNC: <0.01 NG/ML (ref 0–0.03)
UROBILINOGEN UR QL STRIP: ABNORMAL
WBC NRBC COR # BLD: 8.77 10*3/MM3 (ref 4.5–10.7)
WBC UR QL AUTO: ABNORMAL /HPF

## 2017-02-14 PROCEDURE — 71010 HC CHEST PA OR AP: CPT

## 2017-02-14 PROCEDURE — 93005 ELECTROCARDIOGRAM TRACING: CPT

## 2017-02-14 PROCEDURE — 84484 ASSAY OF TROPONIN QUANT: CPT | Performed by: EMERGENCY MEDICINE

## 2017-02-14 PROCEDURE — 81001 URINALYSIS AUTO W/SCOPE: CPT | Performed by: EMERGENCY MEDICINE

## 2017-02-14 PROCEDURE — 36415 COLL VENOUS BLD VENIPUNCTURE: CPT

## 2017-02-14 PROCEDURE — 85025 COMPLETE CBC W/AUTO DIFF WBC: CPT | Performed by: EMERGENCY MEDICINE

## 2017-02-14 PROCEDURE — 93010 ELECTROCARDIOGRAM REPORT: CPT | Performed by: INTERNAL MEDICINE

## 2017-02-14 PROCEDURE — 80053 COMPREHEN METABOLIC PANEL: CPT | Performed by: EMERGENCY MEDICINE

## 2017-02-14 PROCEDURE — 99284 EMERGENCY DEPT VISIT MOD MDM: CPT

## 2017-02-14 PROCEDURE — 83735 ASSAY OF MAGNESIUM: CPT | Performed by: EMERGENCY MEDICINE

## 2017-02-14 RX ORDER — SODIUM CHLORIDE 0.9 % (FLUSH) 0.9 %
10 SYRINGE (ML) INJECTION AS NEEDED
Status: DISCONTINUED | OUTPATIENT
Start: 2017-02-14 | End: 2017-02-15 | Stop reason: HOSPADM

## 2017-02-14 RX ORDER — ASPIRIN 81 MG/1
81 TABLET ORAL AS NEEDED
COMMUNITY
End: 2022-05-10

## 2017-02-15 ENCOUNTER — HOSPITAL ENCOUNTER (EMERGENCY)
Facility: HOSPITAL | Age: 67
Discharge: HOME | End: 2017-02-15
Attending: EMERGENCY MEDICINE | Admitting: EMERGENCY MEDICINE

## 2017-02-15 VITALS
BODY MASS INDEX: 26.52 KG/M2 | TEMPERATURE: 97.4 F | HEART RATE: 68 BPM | WEIGHT: 165 LBS | SYSTOLIC BLOOD PRESSURE: 164 MMHG | RESPIRATION RATE: 16 BRPM | OXYGEN SATURATION: 95 % | DIASTOLIC BLOOD PRESSURE: 104 MMHG | HEIGHT: 66 IN

## 2017-02-15 DIAGNOSIS — R42 LIGHTHEADEDNESS: Primary | ICD-10-CM

## 2017-02-15 DIAGNOSIS — R42 DIZZINESS: ICD-10-CM

## 2017-02-15 LAB
HOLD SPECIMEN: NORMAL
HOLD SPECIMEN: NORMAL
WHOLE BLOOD HOLD SPECIMEN: NORMAL
WHOLE BLOOD HOLD SPECIMEN: NORMAL

## 2017-02-15 NOTE — ED PROVIDER NOTES
66 y.o. female presents c/o light-headedness and fatigue.    On exam:  Lungs/cardiovascular: Heart is regular rhythm and rate. No murmurs. Lungs CTAB   Abdomen: Normal active bowel sounds, soft/nontender.     BP is elevated but not sure if correlated. Will have her keep a journal to monitor and f/u with PCP.     I supervised care provided by the midlevel provider.  We have discussed this patient's history, physical exam, and treatment plan.  I have reviewed the note and personally saw and examined the patient and agree with the plan of care.    --  Documentation assistance provided by dori Walker.  Information recorded by the scribe was done at my direction and has been verified and validated by me.     Linda Walker  02/15/17 0417       Teddy Coyne MD  02/15/17 0437

## 2017-02-15 NOTE — ED PROVIDER NOTES
EMERGENCY DEPARTMENT ENCOUNTER    CHIEF COMPLAINT  Chief Complaint: dizziness and fatigue  History given by: patient   History limited by: nothing   Room Number: 03/03   PMD: Vicenta Newberry MD    HPI:  Pt is a 66 y.o. female who presents complaining of intermittent lightheadedness and fatigue for approximately 2 weeks. She does not believe the lightheadedness is exacerbated or alleviated by anything. Pt also complains of chest tightness and mild abd pain from resolving diverticulitis. Pt states she was recently on Ciprofloxacin and Flagyl for diverticulitis, and the lightheadedness onset towards the end of that regimen. Pt states was recently informed that recent labs indicated hypokalemia. Pt denies chest pain, palpitations, SOA, and any other sx at this time. Pt has a family h/o heart issues. Pt has a persona h/o anxiety, diverticulitis, hypertension, and hyperlipemia. Pt states it has been many years since she had a stress test. Pt had a carotid US on 02/10/17.     Duration: 2 weeks   Timing: intermittent  Location: generalized   Radiation: does not radiate   Quality: new   Intensity/Severity: mild  Progression: unchanged   Associated Symptoms: lightheadedness, fatigue   Aggravating Factors: none specified   Alleviating Factors: none specified   Previous Episodes: none specified   Treatment before arrival: none specified     PAST MEDICAL HISTORY  Active Ambulatory Problems     Diagnosis Date Noted   • Benign essential hypertension 03/14/2016   • Disc disorder of cervical region 03/14/2016   • Degeneration of intervertebral disc of lumbar region 03/14/2016   • Diverticulosis of intestine 03/14/2016   • Steatosis of liver 03/14/2016   • Fibrocystic breast changes 03/14/2016   • Hypercholesterolemia 03/14/2016   • Osteoarthritis of knee 03/14/2016   • Osteopenia 03/14/2016   • Impaired fasting blood sugar 03/14/2016   • Scoliosis 03/14/2016   • Lipoma of left lower extremity 08/04/2016   • Health care maintenance  08/04/2016   • Diverticulitis of large intestine 08/19/2016   • Aortic atherosclerosis 02/07/2017   • Dizziness 02/10/2017     Resolved Ambulatory Problems     Diagnosis Date Noted   • Bone deformity 03/14/2016     Past Medical History   Diagnosis Date   • Anxiety    • Coronary artery disease    • Diverticulitis    • Hyperlipidemia    • Hypertension    • Lateral epicondylitis of right elbow    • Pancreatic cyst    • Post-menopausal    • Pregnancy    • Uterine leiomyoma        PAST SURGICAL HISTORY  Past Surgical History   Procedure Laterality Date   • Colonoscopy  06/14/2011            FAMILY HISTORY  Family History   Problem Relation Age of Onset   • Hypertension Mother    • Osteoarthritis Mother    • Breast cancer Mother      in situ   • Stroke Mother    • Diabetes Mother    • Heart attack Father 38   • Coronary artery disease Father        SOCIAL HISTORY  Social History     Social History   • Marital status: Unknown     Spouse name: N/A   • Number of children: N/A   • Years of education: N/A     Occupational History   • Not on file.     Social History Main Topics   • Smoking status: Former Smoker     Packs/day: 1.00     Years: 10.00     Types: Cigarettes     Quit date: 1971   • Smokeless tobacco: Not on file   • Alcohol use Yes      Comment: social   • Drug use: No   • Sexual activity: Not on file     Other Topics Concern   • Not on file     Social History Narrative       ALLERGIES  Amoxicillin-pot clavulanate    REVIEW OF SYSTEMS  Review of Systems   Constitutional: Positive for fatigue. Negative for fever.   HENT: Negative for sore throat.    Eyes: Negative.    Respiratory: Positive for chest tightness. Negative for cough and shortness of breath.    Cardiovascular: Negative for chest pain and palpitations.   Gastrointestinal: Positive for abdominal pain (mild, improving). Negative for diarrhea and vomiting.   Genitourinary: Negative for dysuria.   Musculoskeletal: Negative for neck pain.   Skin:  Negative for rash.   Allergic/Immunologic: Negative.    Neurological: Positive for light-headedness. Negative for weakness, numbness and headaches.   Hematological: Negative.    Psychiatric/Behavioral: Negative.    All other systems reviewed and are negative.      PHYSICAL EXAM  ED Triage Vitals   Temp Heart Rate Resp BP SpO2   02/14/17 2117 02/14/17 2120 02/14/17 2117 02/14/17 2150 02/14/17 2120   97.4 °F (36.3 °C) 85 18 173/96 99 %      Temp src Heart Rate Source Patient Position BP Location FiO2 (%)   02/14/17 2117 02/14/17 2120 -- -- --   Tympanic Monitor          Physical Exam   Constitutional: She is oriented to person, place, and time and well-developed, well-nourished, and in no distress. No distress.   HENT:   Head: Normocephalic and atraumatic.   Eyes: EOM are normal. Pupils are equal, round, and reactive to light.   Neck: Normal range of motion. Neck supple.   Cardiovascular: Normal rate, regular rhythm and normal heart sounds.    Pulmonary/Chest: Effort normal and breath sounds normal. No respiratory distress.   Abdominal: Soft. There is no tenderness. There is no rebound and no guarding.   Musculoskeletal: Normal range of motion. She exhibits no edema.   Neurological: She is alert and oriented to person, place, and time. She has normal sensation and normal strength.   Skin: Skin is warm and dry. No rash noted.   Psychiatric: Mood and affect normal.   Nursing note and vitals reviewed.      LAB RESULTS  Lab Results (last 24 hours)     Procedure Component Value Units Date/Time    CBC & Differential [[id number]] Collected:  02/14/17 2218    Specimen:  Blood Updated:  02/14/17 2234    Narrative:       The following orders were created for panel order CBC & Differential.  Procedure                               Abnormality         Status                     ---------                               -----------         ------                     CBC Auto Differential[[id number]]         Normal              Final  result                 Please view results for these tests on the individual orders.    Comprehensive Metabolic Panel [[id number]]  (Abnormal) Collected:  02/14/17 2218    Specimen:  Blood Updated:  02/14/17 2254     Glucose 112 (H) mg/dL      BUN 10 mg/dL      Creatinine 0.69 mg/dL      Sodium 141 mmol/L      Potassium 3.4 (L) mmol/L      Chloride 101 mmol/L      CO2 25.9 mmol/L      Calcium 9.6 mg/dL      Total Protein 7.1 g/dL      Albumin 4.60 g/dL      ALT (SGPT) 27 U/L      AST (SGOT) 21 U/L      Alkaline Phosphatase 60 U/L      Total Bilirubin 0.5 mg/dL      eGFR Non African Amer 85 mL/min/1.73      Globulin 2.5 gm/dL      A/G Ratio 1.8 g/dL      BUN/Creatinine Ratio 14.5      Anion Gap 14.1 mmol/L     Troponin [[id number]]  (Normal) Collected:  02/14/17 2218    Specimen:  Blood Updated:  02/14/17 2254     Troponin T <0.010 ng/mL     Narrative:       Troponin T Reference Ranges:  Less than 0.03 ng/mL:    Negative for AMI  0.03 to 0.09 ng/mL:      Indeterminant for AMI  Greater than 0.09 ng/mL: Positive for AMI    Magnesium [[id number]]  (Normal) Collected:  02/14/17 2218    Specimen:  Blood Updated:  02/14/17 2254     Magnesium 2.3 mg/dL     CBC Auto Differential [[id number]]  (Normal) Collected:  02/14/17 2218    Specimen:  Blood Updated:  02/14/17 2234     WBC 8.77 10*3/mm3      RBC 4.83 10*6/mm3      Hemoglobin 14.6 g/dL      Hematocrit 41.6 %      MCV 86.1 fL      MCH 30.2 pg      MCHC 35.1 g/dL      RDW 12.9 %      RDW-SD 41.1 fl      MPV 10.3 fL      Platelets 307 10*3/mm3      Neutrophil % 67.9 %      Lymphocyte % 23.1 %      Monocyte % 7.4 %      Eosinophil % 1.3 %      Basophil % 0.3 %      Immature Grans % 0.0 %      Neutrophils, Absolute 5.95 10*3/mm3      Lymphocytes, Absolute 2.03 10*3/mm3      Monocytes, Absolute 0.65 10*3/mm3      Eosinophils, Absolute 0.11 10*3/mm3      Basophils, Absolute 0.03 10*3/mm3      Immature Grans, Absolute 0.00 10*3/mm3     Urinalysis With / Culture If Indicated [[id number]]   (Abnormal) Collected:  02/14/17 2225    Specimen:  Urine from Urine, Clean Catch Updated:  02/14/17 2239     Color, UA Yellow      Appearance, UA Clear      pH, UA 7.0      Specific Gravity, UA <=1.005      Glucose, UA Negative      Ketones, UA Negative      Bilirubin, UA Negative      Blood, UA Trace (A)      Protein, UA Negative      Leuk Esterase, UA Negative      Nitrite, UA Negative      Urobilinogen, UA 0.2 E.U./dL     Urinalysis, Microscopic Only [[id number]]  (Abnormal) Collected:  02/14/17 2225    Specimen:  Urine from Urine, Clean Catch Updated:  02/14/17 2239     RBC, UA 0-2 (A) /HPF      WBC, UA 0-2 (A) /HPF      Bacteria, UA None Seen /HPF      Squamous Epithelial Cells, UA 0-2 /HPF      Hyaline Casts, UA None Seen /LPF      Methodology Automated Microscopy           I ordered the above labs and reviewed the results    RADIOLOGY  XR Chest 1 View   Preliminary Result   No acute findings.                 I ordered the above noted radiological studies. Interpreted by radiologist. Reviewed by me in PACS.       PROCEDURES  Procedures  EKG           EKG time: 2215  Rhythm/Rate: SR at 77  P waves and WI: normal  QRS, axis: borderline LAD   ST and T waves: nonspecific T wave changes in anterior leads     Interpreted Contemporaneously by me, independently viewed  No prior for comparison      PROGRESS AND CONSULTS  ED Course     2154: Ordered CXR, EKG, and labs for further evaluation.     0245: Discussed pt's case with Dr. Coyne, who agrees with plan for care. I have ordered orthostatics.     0323: Rechecked pt. Pt is resting comfortably. Pt was not orthostatic but reported mild generalized weakness upon standing. Discussed unremarkable workup and plan for discharge with cardiac workup. Pt understands and agrees with plan and all questions were addressed.     MEDICAL DECISION MAKING  Results were reviewed/discussed with the patient and they were also made aware of online access. Pt also made aware that some labs,  such as cultures, will not be resulted during ER visit and follow up with PMD is necessary.     MDM  Number of Diagnoses or Management Options  Dizziness:   Lightheadedness:      Amount and/or Complexity of Data Reviewed  Clinical lab tests: ordered and reviewed  Tests in the radiology section of CPT®: ordered and reviewed  Tests in the medicine section of CPT®: ordered and reviewed (EKG time: 2215  Rhythm/Rate: SR at 77  P waves and MD: normal  QRS, axis: borderline LAD   ST and T waves: nonspecific T wave changes in anterior leads     Interpreted Contemporaneously by me, independently viewed  No prior for comparison  )    Patient Progress  Patient progress: stable       DIAGNOSIS  Final diagnoses:   Lightheadedness   Dizziness       DISPOSITION  DISCHARGE    Patient discharged in stable condition.    Reviewed implications of results, diagnosis, meds, responsibility to follow up, warning signs and symptoms of possible worsening, potential complications and reasons to return to ER    Patient/Family voiced understanding of above instructions.    Discussed plan for discharge, as there is no emergent indication for admission.  Pt/family is agreeable and understands need for follow up and repeat testing.  Pt is aware that discharge does not mean that nothing is wrong but it indicates no emergency is present that requires admission and they must continue care with follow-up as given below or physician of their choice.     FOLLOW-UP  [facility] CARDIOLOG[facility]  [address]  [facility] [address] [phone]  Call  For further evaluation and treatment         Medication List      Notice     No changes were made to your prescriptions during this visit.        Latest Documented Vital Signs:  As of 3:54 AM  BP- (!) 164/110 HR- 82 Temp- 97.4 °F (36.3 °C) (Tympanic) O2 sat- 96%    --  Documentation assistance provided by dori Ro.  Information recorded by  the scribe was done at my direction and has been verified and validated by me.          Caity Davis  02/15/17 0351       JUNE Vivas III  02/15/17 0354

## 2017-02-15 NOTE — ED NOTES
Pt reports dizziness off and on for a week or two. Pt reports seeing her PCP who reported that her potassium was lower. Pt reports earlier having some tightness in her chest and she felt cold which brought her in tonight. Pt denies pain and SOB. Pt reports that she feels better when she is laying down. Pt reports that the room is not spinning she just feels light headed.      Shey Hoyos RN  02/14/17 3892

## 2017-02-16 ENCOUNTER — DOCUMENTATION (OUTPATIENT)
Dept: SOCIAL WORK | Facility: HOSPITAL | Age: 67
End: 2017-02-16

## 2017-02-17 ENCOUNTER — OFFICE VISIT (OUTPATIENT)
Dept: INTERNAL MEDICINE | Facility: CLINIC | Age: 67
End: 2017-02-17

## 2017-02-17 VITALS
SYSTOLIC BLOOD PRESSURE: 142 MMHG | WEIGHT: 162 LBS | DIASTOLIC BLOOD PRESSURE: 80 MMHG | HEIGHT: 66 IN | BODY MASS INDEX: 26.03 KG/M2

## 2017-02-17 DIAGNOSIS — R42 DIZZINESS: ICD-10-CM

## 2017-02-17 DIAGNOSIS — I70.0 AORTIC ATHEROSCLEROSIS (HCC): ICD-10-CM

## 2017-02-17 DIAGNOSIS — E87.6 HYPOKALEMIA: ICD-10-CM

## 2017-02-17 DIAGNOSIS — I10 BENIGN ESSENTIAL HYPERTENSION: Primary | ICD-10-CM

## 2017-02-17 DIAGNOSIS — E78.00 HYPERCHOLESTEROLEMIA: ICD-10-CM

## 2017-02-17 DIAGNOSIS — Z82.49 FAMILY HISTORY OF PREMATURE CORONARY ARTERY DISEASE: ICD-10-CM

## 2017-02-17 PROCEDURE — 99214 OFFICE O/P EST MOD 30 MIN: CPT | Performed by: INTERNAL MEDICINE

## 2017-02-17 RX ORDER — TRIAMTERENE AND HYDROCHLOROTHIAZIDE 37.5; 25 MG/1; MG/1
0.5 TABLET ORAL DAILY
Qty: 16 TABLET | Refills: 11 | Status: SHIPPED | OUTPATIENT
Start: 2017-02-17 | End: 2017-03-07

## 2017-02-17 NOTE — PROGRESS NOTES
Subjective   Jana Page is a 66 y.o. female. Here for ED visit f/u    History of Present Illness   Jana Page  presents for [facility] emergency department visit f/u. Patient had to seek treatment at the mentioned above facility for the lightheadedness and kathe tightness. Symptoms started day of the visit. Jana Page was not given explanations for her lightheadedness, but all test were normal. Tests and procedures done: x-ray, blood tests and ECG. Treatments: none. Patient was discharged home. Medication prescribed: none.  Patient reports that at home condition improved. Patient had been compliant with recommendations. Dizziness had resolved.  Records reviewed and discussed with patient.   Patient has very strong FH of premature CAD and had been discovered to have plaques in the carotid arteries and elevated ca coronary score in spite of taking Simvastatin 20 mg a day for years. Here for car Ca score discussion.    The following portions of the patient's history were reviewed and updated as appropriate: allergies, current medications, past family history, past medical history, past social history, past surgical history and problem list.    Review of Systems   Constitutional: Negative for chills and fever.   Eyes: Negative for pain and redness.   Respiratory: Negative for cough and shortness of breath.    Cardiovascular: Negative for chest pain and leg swelling.   Neurological: Positive for light-headedness (earlier in week but been ok these last 2 days). Negative for dizziness and headaches.       Objective   Physical Exam   Constitutional: She is oriented to person, place, and time. She appears well-developed.   HENT:   Head: Normocephalic and atraumatic.   Right Ear: Tympanic membrane, external ear and ear canal normal.   Left Ear: Tympanic membrane, external ear and ear canal normal.   Nose: Nose normal. Right sinus exhibits no maxillary sinus tenderness and no frontal sinus tenderness. Left  sinus exhibits no maxillary sinus tenderness and no frontal sinus tenderness.   Mouth/Throat: Uvula is midline, oropharynx is clear and moist and mucous membranes are normal.   Eyes: Conjunctivae and EOM are normal. Pupils are equal, round, and reactive to light. Right eye exhibits no discharge. Left eye exhibits no discharge. No scleral icterus.   Neck: Neck supple. No JVD present.   Cardiovascular: Normal rate, regular rhythm and normal heart sounds.  Exam reveals no gallop and no friction rub.    No murmur heard.  Pulmonary/Chest: Effort normal and breath sounds normal. She has no wheezes. She has no rales.   Musculoskeletal: She exhibits no edema.   Lymphadenopathy:     She has no cervical adenopathy.   Neurological: She is alert and oriented to person, place, and time. No cranial nerve deficit.   Skin: Skin is warm and dry. No rash noted.   Psychiatric: She has a normal mood and affect. Her behavior is normal.   Vitals reviewed.      Assessment/Plan   Diagnoses and all orders for this visit:    Benign essential hypertension  -     triamterene-hydrochlorothiazide (MAXZIDE-25) 37.5-25 MG per tablet; Take 0.5 tablets by mouth Daily.    Hypercholesterolemia    Hypokalemia    Family history of premature coronary artery disease  -     Ambulatory Referral to Cardiology    Dizziness    Aortic atherosclerosis  -     Ambulatory Referral to Cardiology      1. HTN - usually well controlled, but elevated while in the ED and borderline today.I will change HCTZ 12.5mg to Triamterene/HCTZ 25 mg - patient to take 1/2 pill a day.Advised to get manometer and to check her BP at home.  2. Hyperlipidemia - recently changed from Simvastatin to Atorvastatin - target LDL is below 70. Will reevaluate in April.  3. Hypokalemia - see change in diuretic as above.  4. FH of premature CAD and elevated Ca score - will refer to cardiologist for the considerations of further testing.  5. Dizziness - possibly multifactorial: due to the use of  antibiotics, due to hypokalemia, dehydration, anxiety or viral. Resolved. Reassured.

## 2017-02-17 NOTE — PATIENT INSTRUCTIONS
1. HTN - usually well controlled, but elevated while in the ED and borderline today.I will change HCTZ 12.5mg to Triamterene/HCTZ 25 mg - patient to take 1/2 pill a day.  2. Hyperlipidemia - recently changed from Simvastatin to Atorvastatin - target LDL is below 70. Will reevaluate in April.  3. Hypokalemia - see change in diuretic as above.  4. FH of premature CAD and elevated Ca score - will refer to cardiologist for the considerations of further testing.  5. Dizziness - possibly multifactorial: due to the use of antibiotics, due to hypokalemia, dehydration, anxiety or viral. Resolved. Reassured.

## 2017-03-04 ENCOUNTER — APPOINTMENT (OUTPATIENT)
Dept: GENERAL RADIOLOGY | Facility: HOSPITAL | Age: 67
End: 2017-03-04

## 2017-03-04 ENCOUNTER — HOSPITAL ENCOUNTER (EMERGENCY)
Facility: HOSPITAL | Age: 67
Discharge: HOME | End: 2017-03-04
Attending: EMERGENCY MEDICINE | Admitting: EMERGENCY MEDICINE

## 2017-03-04 VITALS
TEMPERATURE: 98.1 F | WEIGHT: 162 LBS | BODY MASS INDEX: 26.03 KG/M2 | HEIGHT: 66 IN | HEART RATE: 71 BPM | DIASTOLIC BLOOD PRESSURE: 73 MMHG | OXYGEN SATURATION: 100 % | SYSTOLIC BLOOD PRESSURE: 124 MMHG | RESPIRATION RATE: 18 BRPM

## 2017-03-04 DIAGNOSIS — R42 DIZZY SPELLS: Primary | ICD-10-CM

## 2017-03-04 DIAGNOSIS — I10 ESSENTIAL HYPERTENSION: ICD-10-CM

## 2017-03-04 LAB
ALBUMIN SERPL-MCNC: 4.9 G/DL (ref 3.5–5.2)
ALBUMIN/GLOB SERPL: 2 G/DL
ALP SERPL-CCNC: 65 U/L (ref 39–117)
ALT SERPL W P-5'-P-CCNC: 27 U/L (ref 1–33)
ANION GAP SERPL CALCULATED.3IONS-SCNC: 13.6 MMOL/L
AST SERPL-CCNC: 25 U/L (ref 1–32)
BASOPHILS # BLD AUTO: 0.02 10*3/MM3 (ref 0–0.2)
BASOPHILS NFR BLD AUTO: 0.2 % (ref 0–1.5)
BILIRUB SERPL-MCNC: 0.6 MG/DL (ref 0.1–1.2)
BILIRUB UR QL STRIP: NEGATIVE
BUN BLD-MCNC: 10 MG/DL (ref 8–23)
BUN/CREAT SERPL: 13.5 (ref 7–25)
CALCIUM SPEC-SCNC: 10.1 MG/DL (ref 8.6–10.5)
CHLORIDE SERPL-SCNC: 102 MMOL/L (ref 98–107)
CLARITY UR: CLEAR
CO2 SERPL-SCNC: 24.4 MMOL/L (ref 22–29)
COLOR UR: YELLOW
CREAT BLD-MCNC: 0.74 MG/DL (ref 0.57–1)
DEPRECATED RDW RBC AUTO: 40.9 FL (ref 37–54)
EOSINOPHIL # BLD AUTO: 0.11 10*3/MM3 (ref 0–0.7)
EOSINOPHIL NFR BLD AUTO: 1.2 % (ref 0.3–6.2)
ERYTHROCYTE [DISTWIDTH] IN BLOOD BY AUTOMATED COUNT: 12.9 % (ref 11.7–13)
GFR SERPL CREATININE-BSD FRML MDRD: 79 ML/MIN/1.73
GLOBULIN UR ELPH-MCNC: 2.5 GM/DL
GLUCOSE BLD-MCNC: 100 MG/DL (ref 65–99)
GLUCOSE BLDC GLUCOMTR-MCNC: 92 MG/DL (ref 70–130)
GLUCOSE UR STRIP-MCNC: NEGATIVE MG/DL
HCT VFR BLD AUTO: 43.8 % (ref 35.6–45.5)
HGB BLD-MCNC: 15.3 G/DL (ref 11.9–15.5)
HGB UR QL STRIP.AUTO: NEGATIVE
IMM GRANULOCYTES # BLD: 0 10*3/MM3 (ref 0–0.03)
IMM GRANULOCYTES NFR BLD: 0 % (ref 0–0.5)
KETONES UR QL STRIP: NEGATIVE
LEUKOCYTE ESTERASE UR QL STRIP.AUTO: NEGATIVE
LYMPHOCYTES # BLD AUTO: 2.09 10*3/MM3 (ref 0.9–4.8)
LYMPHOCYTES NFR BLD AUTO: 23.5 % (ref 19.6–45.3)
MAGNESIUM SERPL-MCNC: 2.5 MG/DL (ref 1.6–2.4)
MCH RBC QN AUTO: 30.3 PG (ref 26.9–32)
MCHC RBC AUTO-ENTMCNC: 34.9 G/DL (ref 32.4–36.3)
MCV RBC AUTO: 86.7 FL (ref 80.5–98.2)
MONOCYTES # BLD AUTO: 0.9 10*3/MM3 (ref 0.2–1.2)
MONOCYTES NFR BLD AUTO: 10.1 % (ref 5–12)
NEUTROPHILS # BLD AUTO: 5.76 10*3/MM3 (ref 1.9–8.1)
NEUTROPHILS NFR BLD AUTO: 65 % (ref 42.7–76)
NITRITE UR QL STRIP: NEGATIVE
PH UR STRIP.AUTO: 6.5 [PH] (ref 5–8)
PLATELET # BLD AUTO: 278 10*3/MM3 (ref 140–500)
PMV BLD AUTO: 10.4 FL (ref 6–12)
POTASSIUM BLD-SCNC: 3.6 MMOL/L (ref 3.5–5.2)
PROT SERPL-MCNC: 7.4 G/DL (ref 6–8.5)
PROT UR QL STRIP: NEGATIVE
RBC # BLD AUTO: 5.05 10*6/MM3 (ref 3.9–5.2)
SODIUM BLD-SCNC: 140 MMOL/L (ref 136–145)
SP GR UR STRIP: 1.01 (ref 1–1.03)
TROPONIN T SERPL-MCNC: <0.01 NG/ML (ref 0–0.03)
UROBILINOGEN UR QL STRIP: NORMAL
WBC NRBC COR # BLD: 8.88 10*3/MM3 (ref 4.5–10.7)

## 2017-03-04 PROCEDURE — 71010 HC CHEST PA OR AP: CPT

## 2017-03-04 PROCEDURE — 82962 GLUCOSE BLOOD TEST: CPT

## 2017-03-04 PROCEDURE — 85025 COMPLETE CBC W/AUTO DIFF WBC: CPT | Performed by: EMERGENCY MEDICINE

## 2017-03-04 PROCEDURE — 99284 EMERGENCY DEPT VISIT MOD MDM: CPT

## 2017-03-04 PROCEDURE — 84484 ASSAY OF TROPONIN QUANT: CPT | Performed by: EMERGENCY MEDICINE

## 2017-03-04 PROCEDURE — 80053 COMPREHEN METABOLIC PANEL: CPT | Performed by: EMERGENCY MEDICINE

## 2017-03-04 PROCEDURE — 93005 ELECTROCARDIOGRAM TRACING: CPT

## 2017-03-04 PROCEDURE — 96374 THER/PROPH/DIAG INJ IV PUSH: CPT

## 2017-03-04 PROCEDURE — 93010 ELECTROCARDIOGRAM REPORT: CPT | Performed by: INTERNAL MEDICINE

## 2017-03-04 PROCEDURE — 83735 ASSAY OF MAGNESIUM: CPT | Performed by: EMERGENCY MEDICINE

## 2017-03-04 PROCEDURE — 96361 HYDRATE IV INFUSION ADD-ON: CPT

## 2017-03-04 PROCEDURE — 81003 URINALYSIS AUTO W/O SCOPE: CPT | Performed by: EMERGENCY MEDICINE

## 2017-03-04 PROCEDURE — 36415 COLL VENOUS BLD VENIPUNCTURE: CPT | Performed by: EMERGENCY MEDICINE

## 2017-03-04 RX ORDER — SODIUM CHLORIDE 0.9 % (FLUSH) 0.9 %
10 SYRINGE (ML) INJECTION AS NEEDED
Status: DISCONTINUED | OUTPATIENT
Start: 2017-03-04 | End: 2017-03-05 | Stop reason: HOSPADM

## 2017-03-04 RX ORDER — LABETALOL HYDROCHLORIDE 5 MG/ML
10 INJECTION, SOLUTION INTRAVENOUS ONCE
Status: COMPLETED | OUTPATIENT
Start: 2017-03-04 | End: 2017-03-04

## 2017-03-04 RX ADMIN — SODIUM CHLORIDE 1000 ML: 9 INJECTION, SOLUTION INTRAVENOUS at 21:00

## 2017-03-04 RX ADMIN — LABETALOL HYDROCHLORIDE 10 MG: 5 INJECTION, SOLUTION INTRAVENOUS at 21:01

## 2017-03-05 LAB
HOLD SPECIMEN: NORMAL
WHOLE BLOOD HOLD SPECIMEN: NORMAL

## 2017-03-05 PROCEDURE — 99284 EMERGENCY DEPT VISIT MOD MDM: CPT

## 2017-03-05 NOTE — ED NOTES
Pt states blood pressure running high and feeling lightheaded today, states weak and tingly, not really dizzy. No other neurologic deficits noted. States has headaches but not currently. Saw primary md on 2/17/2017 and had medications adjusted.     Kamryn Hand RN  03/04/17 2613

## 2017-03-05 NOTE — ED NOTES
Pt c/o intermittent dizziness and increased blood pressure that started approx 1-2months ago. Pt was seen here 2/14 for same and by her pmd. Recent change in bp med. Today pt states that her arms felt light with the dizziness.      Isabell Mckeon RN  03/04/17 2027

## 2017-03-06 ENCOUNTER — HOSPITAL ENCOUNTER (EMERGENCY)
Facility: HOSPITAL | Age: 67
Discharge: HOME | End: 2017-03-06
Attending: EMERGENCY MEDICINE | Admitting: EMERGENCY MEDICINE

## 2017-03-06 VITALS
HEART RATE: 62 BPM | SYSTOLIC BLOOD PRESSURE: 129 MMHG | RESPIRATION RATE: 16 BRPM | OXYGEN SATURATION: 96 % | BODY MASS INDEX: 25.71 KG/M2 | TEMPERATURE: 97.7 F | HEIGHT: 66 IN | DIASTOLIC BLOOD PRESSURE: 86 MMHG | WEIGHT: 160 LBS

## 2017-03-06 DIAGNOSIS — I10 ESSENTIAL HYPERTENSION: Primary | ICD-10-CM

## 2017-03-06 RX ORDER — CLONIDINE HYDROCHLORIDE 0.1 MG/1
0.1 TABLET ORAL ONCE
Status: COMPLETED | OUTPATIENT
Start: 2017-03-06 | End: 2017-03-06

## 2017-03-06 RX ORDER — CLONIDINE HYDROCHLORIDE 0.1 MG/1
0.1 TABLET ORAL 2 TIMES DAILY
Qty: 60 TABLET | Refills: 0 | Status: SHIPPED | OUTPATIENT
Start: 2017-03-06 | End: 2017-03-07

## 2017-03-06 RX ADMIN — CLONIDINE HYDROCHLORIDE 0.1 MG: 0.1 TABLET ORAL at 03:55

## 2017-03-06 NOTE — ED PROVIDER NOTES
EMERGENCY DEPARTMENT ENCOUNTER    CHIEF COMPLAINT  Chief Complaint: Hypertension  History given by: patient   History limited by: n/a  Room Number: 21/21  PMD: Vicenta Newberry MD      HPI:  Pt is a 66 y.o. female who presents complaining of hypertension. Pt states that her BP at 22:00 was 201/110. Pt also complains of lightheadedness. Pt is currently on Lisinopril and triamterene-HTC, and states that her doses have not been changed in about 2-3 weeks.  Pt states that she was seen here yesterday secondary to hypertension, and is scheduled to f/u with cardiology on 3/17.     Duration:  1 day  Onset: unknown  Timing: constant  Location: n/a  Radiation: n/a  Quality: 201/110 at home  Intensity/Severity: moderate   Progression: unchanged   Associated Symptoms: lightheadedness  Aggravating Factors: none  Alleviating Factors: none  Previous Episodes: hx of hypertension   Treatment before arrival: Lisinopril and  triamterene-HTC    PAST MEDICAL HISTORY  Active Ambulatory Problems     Diagnosis Date Noted   • Benign essential hypertension 03/14/2016   • Disc disorder of cervical region 03/14/2016   • Degeneration of intervertebral disc of lumbar region 03/14/2016   • Diverticulosis of intestine 03/14/2016   • Steatosis of liver 03/14/2016   • Fibrocystic breast changes 03/14/2016   • Hypercholesterolemia 03/14/2016   • Osteoarthritis of knee 03/14/2016   • Osteopenia 03/14/2016   • Impaired fasting blood sugar 03/14/2016   • Scoliosis 03/14/2016   • Lipoma of left lower extremity 08/04/2016   • Health care maintenance 08/04/2016   • Diverticulitis of large intestine 08/19/2016   • Aortic atherosclerosis 02/07/2017   • Dizziness 02/10/2017   • Hypokalemia 02/17/2017   • Family history of premature coronary artery disease 02/17/2017     Resolved Ambulatory Problems     Diagnosis Date Noted   • Bone deformity 03/14/2016     Past Medical History   Diagnosis Date   • Anxiety    • Coronary artery disease    • Diverticulitis    •  Hyperlipidemia    • Hypertension    • Lateral epicondylitis of right elbow    • Pancreatic cyst    • Post-menopausal    • Pregnancy    • Uterine leiomyoma        PAST SURGICAL HISTORY  Past Surgical History   Procedure Laterality Date   • Colonoscopy  06/14/2011            FAMILY HISTORY  Family History   Problem Relation Age of Onset   • Hypertension Mother    • Osteoarthritis Mother    • Breast cancer Mother      in situ   • Stroke Mother    • Diabetes Mother    • Heart attack Father 38   • Coronary artery disease Father        SOCIAL HISTORY  Social History     Social History   • Marital status: Single     Spouse name: N/A   • Number of children: N/A   • Years of education: N/A     Occupational History   • Not on file.     Social History Main Topics   • Smoking status: Former Smoker     Packs/day: 1.00     Years: 10.00     Types: Cigarettes     Quit date: 1971   • Smokeless tobacco: Not on file   • Alcohol use Yes      Comment: social   • Drug use: No   • Sexual activity: Defer     Other Topics Concern   • Not on file     Social History Narrative   • No narrative on file       ALLERGIES  Amoxicillin-pot clavulanate    REVIEW OF SYSTEMS  Review of Systems   Constitutional: Negative for chills and fever.   HENT: Negative for congestion and sore throat.    Eyes: Negative.    Respiratory: Negative for cough and shortness of breath.    Cardiovascular: Negative for chest pain and leg swelling.   Gastrointestinal: Negative for abdominal pain, diarrhea and vomiting.   Genitourinary: Negative for difficulty urinating and dysuria.   Musculoskeletal: Negative for back pain and neck pain.   Skin: Negative for rash and wound.   Allergic/Immunologic: Negative.    Neurological: Positive for light-headedness. Negative for dizziness, weakness and numbness.   Psychiatric/Behavioral: Negative.    All other systems reviewed and are negative.      PHYSICAL EXAM  ED Triage Vitals   Temp Heart Rate Resp BP SpO2   03/05/17 2241  03/05/17 2225 03/05/17 2225 03/05/17 2241 03/05/17 2225   99.3 °F (37.4 °C) 103 18 158/96 100 %      Temp src Heart Rate Source Patient Position BP Location FiO2 (%)   03/05/17 2241 03/05/17 2225 03/05/17 2241 03/05/17 2241 --   Tympanic Monitor Sitting Left arm        Physical Exam   Constitutional: She is oriented to person, place, and time and well-developed, well-nourished, and in no distress. No distress.   HENT:   Head: Normocephalic and atraumatic.   Eyes: EOM are normal. Pupils are equal, round, and reactive to light.   Neck: Normal range of motion. Neck supple.   Cardiovascular: Normal rate, regular rhythm and normal heart sounds.    Pulmonary/Chest: Effort normal and breath sounds normal. No respiratory distress.   Abdominal: Soft. There is no tenderness. There is no rebound and no guarding.   Musculoskeletal: Normal range of motion. She exhibits no edema.   Neurological: She is alert and oriented to person, place, and time.   Skin: Skin is warm and dry. No rash noted.   Psychiatric: Mood and affect normal.   Nursing note and vitals reviewed.      PROCEDURES  Procedures      PROGRESS AND CONSULTS  ED Course     03:48  Catapres ordered to treat hypertension.     03:50  Rechecked the patient who is in NAD and is resting comfortably. Advised the pt of the plan to treat the BP in the ED. Will monitor pt's response. Plan to review medication list to adjust home medications. Pt understands and agrees with the plan, all questions answered.    04:30  BP- 142/98 HR- 72 Temp- 97.7 °F (36.5 °C) (Tympanic) O2 sat- 99%  Rechecked the patient who is in NAD and is resting comfortably. BP is improved. Plan to observe for ~ 30 more minutes. Pt understands and agrees with the plan, all questions answered.    04:54  BP is now 129/86. Rechecked the patient who is in NAD and is resting comfortably. Pt will be discharge. Pt instructed to call cardiology for f/u appointment. Pt understands and agrees with the plan, all questions  answered.       MEDICAL DECISION MAKING  Results were reviewed/discussed with the patient and they were also made aware of online access. Pt also made aware that some labs, such as cultures, will not be resulted during ER visit and follow up with PMD is necessary.     MDM       DIAGNOSIS  Final diagnoses:   None       DISPOSITION  DISCHARGE    Patient discharged in stable condition.    Reviewed implications of results, diagnosis, meds, responsibility to follow up, warning signs and symptoms of possible worsening, potential complications and reasons to return to ER.    Patient/Family voiced understanding of above instructions.    Discussed plan for discharge, as there is no emergent indication for admission.  Pt/family is agreeable and understands need for follow up and repeat testing.  Pt is aware that discharge does not mean that nothing is wrong but it indicates no emergency is present that requires admission and they must continue care with follow-up as given below or physician of their choice.     FOLLOW-UP  No follow-up provider specified.       Medication List      Notice     No changes were made to your prescriptions during this visit.        Latest Documented Vital Signs:  As of 4:32 AM  BP- 151/96 HR- 72 Temp- 97.7 °F (36.5 °C) (Tympanic) O2 sat- 99%    --  Documentation assistance provided by dori Kahn for Dr Will.  Information recorded by the scrvalerie was done at my direction and has been verified and validated by me.          Shelly Kahn  03/06/17 2906       Luke Will MD  03/06/17 3324

## 2017-03-06 NOTE — ED NOTES
Pt reports that she was seen here yesterday for HTN and D/Jose Armando from ER. Pt reports that she checked it and it was 211/110. Pt reports that she feels dizzy and faint     Shey Hoyos RN  03/05/17 8971

## 2017-03-06 NOTE — ED NOTES
"Pt c/o dizziness and HA that started today. Pt seen in this ED for elevated BP yesterday and has apt. With cardiologist 3/17. Pt reports hx of hypertension and states she took her prescibed BP meds around 10:30 this evening. Pt also states \" I need to see a cardiologist now, I've never felt this bad in my life!\" Resp even and unlabored. Skin PWD. Pt appears anxious and uneasy.     Ginny Banks RN  03/05/17 3284    "

## 2017-03-07 ENCOUNTER — OFFICE VISIT (OUTPATIENT)
Dept: CARDIOLOGY | Facility: CLINIC | Age: 67
End: 2017-03-07

## 2017-03-07 VITALS
DIASTOLIC BLOOD PRESSURE: 96 MMHG | WEIGHT: 150 LBS | BODY MASS INDEX: 24.11 KG/M2 | HEART RATE: 72 BPM | HEIGHT: 66 IN | SYSTOLIC BLOOD PRESSURE: 144 MMHG

## 2017-03-07 DIAGNOSIS — R42 LIGHTHEADEDNESS: ICD-10-CM

## 2017-03-07 DIAGNOSIS — R07.89 CHEST TIGHTNESS: ICD-10-CM

## 2017-03-07 DIAGNOSIS — I10 ESSENTIAL HYPERTENSION: Primary | ICD-10-CM

## 2017-03-07 DIAGNOSIS — R93.1 ABNORMAL CT SCAN OF HEART: ICD-10-CM

## 2017-03-07 PROCEDURE — 93000 ELECTROCARDIOGRAM COMPLETE: CPT | Performed by: NURSE PRACTITIONER

## 2017-03-07 PROCEDURE — 99204 OFFICE O/P NEW MOD 45 MIN: CPT | Performed by: NURSE PRACTITIONER

## 2017-03-07 RX ORDER — CHLORTHALIDONE 25 MG/1
25 TABLET ORAL DAILY
Qty: 30 TABLET | Refills: 1 | Status: SHIPPED | OUTPATIENT
Start: 2017-03-07 | End: 2017-03-28 | Stop reason: SDUPTHER

## 2017-03-07 NOTE — PROGRESS NOTES
Date of Office Visit: 2017  Encounter Provider: RACHELE Ko  Place of Service: [facility] CARDIOLOGY  Patient Name: Jana Page  :1950    Chief Complaint   Patient presents with   • Hypertension     ER FOLLOW UP   :     HPI: Jana Page is a 66 y.o. female who presents today for evaluation of hypertension.  The patient states that she's been diagnosed with essential hypertension over 12 years ago.  She had previously been on lisinopril 40 mg daily and hydrochlorothiazide 12.5 mg daily in the past.  She says generally her blood pressure was well-controlled.  Over the past 2-3 months she's noticed that her blood pressure is been elevated.  She was placed on Maxide by her primary care physician.  Her blood pressure remains elevated.  She has also been to the emergency department recently with episodes of diverticulitis.  She was placed on antibiotic therapy and Bentyl.  She says her diverticulitis has resolved.    In February while being evaluated for abdominal pain and nausea for presumed diverticulitis she had a CT of the abdomen and pelvis.  The CT also revealed moderate abdominal aortic atherosclerotic changes without aneurysmal dilatation.  She saw Dr. Newberry in the office on 17.  Her PCP switched her from simvastatin 20 mg 2 atorvastatin 20 mg.  She was also started on aspirin 81 mg daily.      She then saw her PCP on 2/10/17 with complaints of feeling dizzy and lightheaded ×1 week.  Her dizziness was felt to be secondary to a side effect of ciprofloxacin versus Flagyl.  She then had a carotid artery duplex completed 2/10/17 which showed plaque bilaterally with no stenosis.    The patient then presented to the [facility] emergency department on 2/15/17 with complaints of dizziness and lightheadedness.  Upon review of the records, orthostatic blood pressures were completed and were normal.  Then followed up with her primary care physician  "once again on 2/17/17 and her hydrochlorothiazide 12-1/2 mg was changed to Maxide half tablet daily.  She then had a calcium CT completed at [facility] and open MRI on 2/13/17.  This revealed a total calcium score 659 and the impression was extensive plaque burden and very high CVD risk.    She then presented to the [facility] emergency department on 3/4/17 with complaints of intermittent lightheadedness that had worsened that day.  Upon review of the records, her blood pressure was elevated at 168/107 and heart rate 88.  Comprehensive metabolic panel, troponin, CBC, and urinalysis were unremarkable.  Magnesium was mildly elevated at 2.5.  The patient received IV labetalol and IV fluid bolus for elevated blood pressure.  Her blood pressure responded well and upon discharge was 123/83.  The patient was recommended to follow-up in our office for further evaluation.    She presented to [facility] emergency department once again on 3/6/17 with complaints of essential hypertension with a blood pressure reading of 201/110 at home.  Once again she complained of lightheadedness.  Blood pressure in the ED was 158/96 and heart rate 103.  She was recommended to take clonidine twice daily.    The patient states that she has \"a nervous wreck\".  She is quite upset about the recent calcium CT score.  She is also worried about her blood pressure.  As far as her lifestyle, she does eat out approximately 4 times per week.  She feels generally that she does eat a heart healthy diet.  She plays tennis 2 times per week and does not experience any anginal symptoms.  She continues to have a chief concern of lightheadedness and feels shaky at times.  She is lightheaded just sitting in the chair today.  She has reported some intermittent chest tightness that can occur at any time both with exertion and nonexertional activities.  The symptoms do not have a particular pattern to them and is not accompanied with shortness of breath, " nausea, vomiting, numbness, tingling, or diaphoresis.  The pain is generally mild.  She further denies shortness of air, paroxysmal nocturnal dyspnea, orthopnea, cough, edema, dizziness, or syncope.      Past Medical History   Diagnosis Date   • Anxiety    • Aortic atherosclerosis    • DDD (degenerative disc disease), cervical    • Diverticulitis of large intestine    • Diverticulosis of intestine    • Fibrocystic breast changes    • Hypercholesterolemia    • Hyperlipidemia    • Hypertension    • Impaired fasting blood sugar    • Lateral epicondylitis of right elbow    • Lightheadedness    • Lipoma of left lower extremity    • Osteoarthritis of knee    • Osteopenia    • Pancreatic cyst      NO CHANGE IN 2012 FROM 2009   • Post-menopausal    • Scoliosis    • Steatosis of liver    • Uterine leiomyoma        Past Surgical History   Procedure Laterality Date   • Colonoscopy  06/14/2011            Social History     Social History   • Marital status: Single     Spouse name: N/A   • Number of children: N/A   • Years of education: N/A     Occupational History   • Not on file.     Social History Main Topics   • Smoking status: Former Smoker     Packs/day: 1.00     Years: 10.00     Types: Cigarettes     Quit date: 1971   • Smokeless tobacco: Not on file   • Alcohol use Yes      Comment: social   • Drug use: No   • Sexual activity: Defer     Other Topics Concern   • Not on file     Social History Narrative       Family History   Problem Relation Age of Onset   • Hypertension Mother    • Osteoarthritis Mother    • Breast cancer Mother      in situ   • Stroke Mother    • Diabetes Mother    • Arrhythmia Mother      AFIB   • Heart attack Father 38   • Coronary artery disease Father        Review of Systems   Constitution: Negative for chills, diaphoresis, fever, malaise/fatigue, night sweats, weight gain and weight loss.   HENT: Negative for hearing loss, nosebleeds, sore throat and tinnitus.    Eyes: Negative for blurred  vision, double vision, pain and visual disturbance.   Cardiovascular: Positive for chest pain. Negative for claudication, cyanosis, dyspnea on exertion, irregular heartbeat, leg swelling, near-syncope, orthopnea, palpitations, paroxysmal nocturnal dyspnea and syncope.   Respiratory: Negative for cough, hemoptysis, shortness of breath, snoring and wheezing.    Endocrine: Negative for cold intolerance, heat intolerance and polyuria.   Hematologic/Lymphatic: Negative for bleeding problem. Does not bruise/bleed easily.   Skin: Negative for color change, dry skin, flushing and itching.   Musculoskeletal: Negative for falls, joint pain, joint swelling, muscle cramps, muscle weakness and myalgias.   Gastrointestinal: Negative for abdominal pain, constipation, heartburn, melena, nausea and vomiting.   Genitourinary: Negative for dysuria and hematuria.   Neurological: Positive for light-headedness. Negative for excessive daytime sleepiness, dizziness, loss of balance, numbness, paresthesias, seizures and vertigo.   Psychiatric/Behavioral: Negative for altered mental status, depression, memory loss and substance abuse. The patient does not have insomnia and is not nervous/anxious.    Allergic/Immunologic: Negative for environmental allergies.       Allergies   Allergen Reactions   • Amoxicillin-Pot Clavulanate          Current Outpatient Prescriptions:   •  aspirin 81 MG EC tablet, Take 81 mg by mouth Daily., Disp: , Rfl:   •  atorvastatin (LIPITOR) 20 MG tablet, Take 1 tablet by mouth Daily., Disp: 30 tablet, Rfl: 11  •  Calcium Carb-Cholecalciferol 600-200 MG-UNIT tablet, Take  by mouth., Disp: , Rfl:   •  Cholecalciferol (VITAMIN D) 1000 UNITS tablet, Take  by mouth., Disp: , Rfl:   •  Coenzyme Q10 (CO Q 10) 10 MG capsule, Take  by mouth., Disp: , Rfl:   •  fluticasone (FLONASE) 50 MCG/ACT nasal spray, 2 sprays into each nostril daily. Administer 2 sprays in each nostril for each dose., Disp: , Rfl:   •  lisinopril  "(PRINIVIL,ZESTRIL) 40 MG tablet, TAKE ONE TABLET BY MOUTH DAILY, Disp: 90 tablet, Rfl: 3  •  Misc Natural Products (OSTEO BI-FLEX JOINT SHIELD) tablet, Take  by mouth., Disp: , Rfl:   •  Multiple Vitamin (MULTIVITAMIN) capsule, Take  by mouth., Disp: , Rfl:   •  Probiotic Product (PRO-BIOTIC BLEND) capsule, Take  by mouth., Disp: , Rfl:   •  psyllium (METAMUCIL) 58.6 % packet, Take 1 packet by mouth Daily., Disp: , Rfl:   •  chlorthalidone (HYGROTON) 25 MG tablet, Take 1 tablet by mouth Daily., Disp: 30 tablet, Rfl: 1     Objective:     Vitals:    03/07/17 0850 03/07/17 0858   BP: 144/96 144/96   BP Location: Right arm Left arm   Pulse: 72    Weight: 150 lb (68 kg)    Height: 66\" (167.6 cm)      Body mass index is 24.21 kg/(m^2).    PHYSICAL EXAM:    Vitals Reviewed.   General Appearance: No acute distress, well developed and well nourished.   Eyes: Conjunctiva and lids: No erythema, swelling, or discharge. Sclera non-icteric.   HENT: Atraumatic, normocephalic. External eyes, ears, and nose normal. No hearing loss noted. Mucous membranes normal. Lips not cyanotic. Neck supple with no tenderness.  Respiratory: No signs of respiratory distress. Respiration rhythm and depth normal.   Clear to auscultation. No rales, crackles, rhonchi, or wheezing auscultated.   Cardiovascular:  Jugular Venous Pressure: Normal  Heart Rate and Rhythm: Normal, Heart Sounds: Normal S1 and S2. No S3 or S4 noted.  Murmurs: No murmurs noted. No rubs, thrills, or gallops.   Arterial Pulses: Carotid pulses normal. No carotid bruit noted. Posterior tibialis and dorsalis pedis pulses normal.   Lower Extremities: No edema noted.  Gastrointestinal:  Abdomen soft, non-distended, non-tender. Normal bowel sounds. No hepatomegaly.   Musculoskeletal: Normal movement of extremities  Skin and Nails: General appearance normal. No pallor, cyanosis, diaphoresis. Skin temperature normal. No clubbing of fingernails.   Psychiatric: Patient alert and oriented to " person, place, and time. Speech and behavior appropriate. Normal mood and affect.       ECG 12 Lead  Date/Time: 3/7/2017 9:02 AM  Performed by: STEPHANIE CARLSON  Authorized by: STEPHANIE CARLSON   Comparison: compared with previous ECG from 3/4/2017  Comparison to previous ECG: NSR with ST-T abnormalities   Rhythm: sinus rhythm  Rate: normal  BPM: 72  Conduction: conduction normal  ST Segments: ST segments normal  T Waves: T waves normal  QRS axis: normal  Other: no other findings  Clinical impression: normal ECG              Assessment:       Diagnosis Plan   1. Essential hypertension  Adult Transthoracic Echo Complete    Stress Test With Myocardial Perfusion One Day    chlorthalidone (HYGROTON) 25 MG tablet    Basic Metabolic Panel   2. Abnormal CT scan of heart  Adult Transthoracic Echo Complete    Stress Test With Myocardial Perfusion One Day   3. Chest tightness  Adult Transthoracic Echo Complete    Stress Test With Myocardial Perfusion One Day    Basic Metabolic Panel   4. Lightheadedness  Basic Metabolic Panel          Plan:       1.  Essential Hypertension: The patient presents today with a chief concern of elevated blood pressure readings over the past 3 months.  She said generally she was previously well controlled on lisinopril 40 mg and hydrochlorothiazide 12.5 mg daily.  She has recently been placed on Maxzide half tablet daily and yesterday was placed on clonidine twice a day.  I have recommended that she discontinue but the clonidine and Maxzide.  I've asked her to continue with the lisinopril 40 mg daily and I will add chlorthalidone 25 mg 1 tablet daily to her regimen.  I also had a very long discussion with her regarding the importance of following a low sodium diet.  We discussed shopping the perimeter the grocery and eating fresh foods.  I've recommended that she avoid eating out for at least one week to see if this makes a difference in her blood pressure readings.  I also explained to her how to  read a food label and provided her information on how to do so.  She will then follow-up in the office in 7-10 days for repeat blood pressure check and her basic metabolic panel the hospital.  Her blood pressure is not well controlled at that time, I will either recommend adding amlodipine or metoprolol.  Further recommendations to follow.    2.  Abnormal CT calcium Score: The patient was noted to have a elevated score of 659.  According to the results of years CT calcium score is greater than 401 there is extensive plaque burden and very high risk of cardiovascular disease.  Her PCP recently placed her on aspirin 81 mg daily and switched her from simvastatin to atorvastatin 20 mg daily.  I will review her upcoming cardiac test results and determine if she needs to increase her atorvastatin from 20 to 40 mg daily and/or add a beta blocker.    3.  Chest Tightness: The patient has been experiencing intermittent chest tightness that does not necessarily occur with exertion nor resolve with rest.  The pain is described somewhat atypical.  With her recent symptoms, elevated blood pressure, an abnormal coronary calcium score I've recommended she had a walking Myoview stress test and 2-D echocardiogram to be completed.    4.  Lightheadedness: The patient has been experiencing lightheadedness over the last month.  At first this was felt to be secondary to her Flagyl and Cipro treatment for diverticulitis.  Despite stopping those medications the lightheadedness has continued.  She denies any dizziness or spinning of the room or herself.  She denies any ear pain or ear ringing.  Her lightheadedness occurs at rest while sitting, but does not worsen with turning of her head.  I'm truly not sure if this is secondary to her Maxide.  I am doubtful it's from the lisinopril because she's been on that medication for years.  We will check the cardiac testing if that comes back normal and her blood pressure becomes better controlled, I  will have her follow-up with her PCP for further evaluation of this lightheadedness.    As always, it has been a pleasure to participate in your patient's care.      Sincerely,         RACHELE Hogue

## 2017-03-07 NOTE — PATIENT INSTRUCTIONS
Instructions:     Take one dose of clonidine today, take 1/2 tablet tomorrow, and skip Thursday, and half on Friday - then stop all together    Stop Maxide   Continue Lisinopril 40 mg daily    Start clorthalidone 25 mg 1 tablet daily     Repeat BP in office in 7-10 days then go to main lab in hospital for lab draw   On same day    Check BP once day     Schedule for stress test and echocardiogram        Hypertension    Hypertension, commonly called high blood pressure, is when the force of blood pumping through your arteries is too strong. Your arteries are the blood vessels that carry blood from your heart throughout your body. A blood pressure reading consists of a higher number over a lower number, such as 110/72. The higher number (systolic) is the pressure inside your arteries when your heart pumps. The lower number (diastolic) is the pressure inside your arteries when your heart relaxes. Ideally you want your blood pressure below 120/80.  Hypertension forces your heart to work harder to pump blood. Your arteries may become narrow or stiff. Having untreated or uncontrolled hypertension can cause heart attack, stroke, kidney disease, and other problems.    RISK FACTORS  Some risk factors for high blood pressure are controllable. Others are not.   Risk factors you cannot control include:   · Race. You may be at higher risk if you are .  · Age. Risk increases with age.  · Gender. Men are at higher risk than women before age 45 years. After age 65, women are at higher risk than men.  Risk factors you can control include:  · Not getting enough exercise or physical activity.  · Being overweight.  · Getting too much fat, sugar, calories, or salt in your diet.  · Drinking too much alcohol.  SIGNS AND SYMPTOMS  Hypertension does not usually cause signs or symptoms. Extremely high blood pressure (hypertensive crisis) may cause headache, anxiety, shortness of breath, and nosebleed.  DIAGNOSIS  To check if  you have hypertension, your health care provider will measure your blood pressure while you are seated, with your arm held at the level of your heart. It should be measured at least twice using the same arm. Certain conditions can cause a difference in blood pressure between your right and left arms. A blood pressure reading that is higher than normal on one occasion does not mean that you need treatment. If it is not clear whether you have high blood pressure, you may be asked to return on a different day to have your blood pressure checked again. Or, you may be asked to monitor your blood pressure at home for 1 or more weeks.    TREATMENT  Treating high blood pressure includes making lifestyle changes and possibly taking medicine. Living a healthy lifestyle can help lower high blood pressure. You may need to change some of your habits.  Lifestyle changes may include:  · Following the DASH diet. This diet is high in fruits, vegetables, and whole grains. It is low in salt, red meat, and added sugars.  · Keep your sodium intake below 2,300 mg per day.  · Getting at least 30-45 minutes of aerobic exercise at least 4 times per week.  · Losing weight if necessary.  · Not smoking.  · Limiting alcoholic beverages.  · Learning ways to reduce stress.  Your health care provider may prescribe medicine if lifestyle changes are not enough to get your blood pressure under control, and if one of the following is true:  · You are 18-59 years of age and your systolic blood pressure is above 140.  · You are 60 years of age or older, and your systolic blood pressure is above 150.  · Your diastolic blood pressure is above 90.  · You have diabetes, and your systolic blood pressure is over 140 or your diastolic blood pressure is over 90.  · You have kidney disease and your blood pressure is above 140/90.  · You have heart disease and your blood pressure is above 140/90.  Your personal target blood pressure may vary depending on your  medical conditions, your age, and other factors.    HOME CARE INSTRUCTIONS  · Have your blood pressure rechecked as directed by your health care provider.    · Take medicines only as directed by your health care provider. Follow the directions carefully. Blood pressure medicines must be taken as prescribed. The medicine does not work as well when you skip doses. Skipping doses also puts you at risk for problems.  · Do not smoke.    · Monitor your blood pressure at home as directed by your health care provider.   SEEK MEDICAL CARE IF:   · You think you are having a reaction to medicines taken.  · You have recurrent headaches or feel dizzy.  · You have swelling in your ankles.  · You have trouble with your vision.  SEEK IMMEDIATE MEDICAL CARE IF:  · You develop a severe headache or confusion.  · You have unusual weakness, numbness, or feel faint.  · You have severe chest or abdominal pain.  · You vomit repeatedly.  · You have trouble breathing.  MAKE SURE YOU:   · Understand these instructions.  · Will watch your condition.  · Will get help right away if you are not doing well or get worse.     This information is not intended to replace advice given to you by your health care provider. Make sure you discuss any questions you have with your health care provider.     Document Released: 12/18/2006 Document Revised: 05/03/2016 Document Reviewed: 10/10/2014  [employer] Interactive Patient Education ©2016 [employer]        Low-Sodium Eating Plan    Sodium raises blood pressure and causes water to be held in the body. Getting less sodium from food will help lower your blood pressure, reduce any swelling, and protect your heart, liver, and kidneys. We get sodium by adding salt (sodium chloride) to food. Most of our sodium comes from canned, boxed, and frozen foods. Restaurant foods, fast foods, and pizza are also very high in sodium. Even if you take medicine to lower your blood pressure or to reduce fluid in your body,  "getting less sodium from your food is important.  WHAT IS MY PLAN?  Most people should limit their sodium intake to 2,300 mg a day. Your health care provider recommends that you limit your sodium intake to __________ a day.   WHAT DO I NEED TO KNOW ABOUT THIS EATING PLAN?  For the low-sodium eating plan, you will follow these general guidelines:  · Choose foods with a % Daily Value for sodium of less than 5% (as listed on the food label).    · Use salt-free seasonings or herbs instead of table salt or sea salt.    · Check with your health care provider or pharmacist before using salt substitutes.    · Eat fresh foods.  · Eat more vegetables and fruits.  · Limit canned vegetables. If you do use them, rinse them well to decrease the sodium.    · Limit cheese to 1 oz (28 g) per day.     · Eat lower-sodium products, often labeled as \"lower sodium\" or \"no salt added.\"  · Avoid foods that contain monosodium glutamate (MSG). MSG is sometimes added to Chinese food and some canned foods.    · Check food labels (Nutrition Facts labels) on foods to learn how much sodium is in one serving.  · Eat more home-cooked food and less restaurant, buffet, and fast food.   · When eating at a restaurant, ask that your food be prepared with less salt, or no salt if possible.    HOW DO I READ FOOD LABELS FOR SODIUM INFORMATION?  The Nutrition Facts label lists the amount of sodium in one serving of the food. If you eat more than one serving, you must multiply the listed amount of sodium by the number of servings.  Food labels may also identify foods as:  · Sodium free--Less than 5 mg in a serving.  · Very low sodium--35 mg or less in a serving.  · Low sodium--140 mg or less in a serving.  · Light in sodium--50% less sodium in a serving. For example, if a food that usually has 300 mg of sodium is changed to become light in sodium, it will have 150 mg of sodium.  · Reduced sodium--25% less sodium in a serving. For example, if a food that " usually has 400 mg of sodium is changed to reduced sodium, it will have 300 mg of sodium.  WHAT FOODS CAN I EAT?  Grains   Low-sodium cereals, including oats, puffed wheat and rice, and shredded wheat cereals. Low-sodium crackers. Unsalted rice and pasta. Lower-sodium bread.   Vegetables   Frozen or fresh vegetables. Low-sodium or reduced-sodium canned vegetables. Low-sodium or reduced-sodium tomato sauce and paste. Low-sodium or reduced-sodium tomato and vegetable juices.   Fruits   Fresh, frozen, and canned fruit. Fruit juice.   Meat and Other Protein Products   Low-sodium canned tuna and salmon. Fresh or frozen meat, poultry, seafood, and fish. Lamb. Unsalted nuts. Dried beans, peas, and lentils without added salt. Unsalted canned beans. Homemade soups without salt. Eggs.   Dairy   Milk. Soy milk. Ricotta cheese. Low-sodium or reduced-sodium cheeses. Yogurt.   Condiments   Fresh and dried herbs and spices. Salt-free seasonings. Onion and garlic powders. Low-sodium varieties of mustard and ketchup. Fresh or refrigerated horseradish. Lemon juice.   Fats and Oils    Reduced-sodium salad dressings. Unsalted butter.    Other   Unsalted popcorn and pretzels.   The items listed above may not be a complete list of recommended foods or beverages. Contact your dietitian for more options.  WHAT FOODS ARE NOT RECOMMENDED?  Grains   Instant hot cereals. Bread stuffing, pancake, and biscuit mixes. Croutons. Seasoned rice or pasta mixes. Noodle soup cups. Boxed or frozen macaroni and cheese. Self-rising flour. Regular salted crackers.  Vegetables   Regular canned vegetables. Regular canned tomato sauce and paste. Regular tomato and vegetable juices. Frozen vegetables in sauces. Salted French fries. Olives. Pickles. Relishes. Sauerkraut. Salsa.  Meat and Other Protein Products   Salted, canned, smoked, spiced, or pickled meats, seafood, or fish. Lam, ham, sausage, hot dogs, corned beef, chipped beef, and packaged luncheon  meats. Salt pork. Jerky. Pickled herring. Anchovies, regular canned tuna, and sardines. Salted nuts.  Dairy   Processed cheese and cheese spreads. Cheese curds. Blue cheese and cottage cheese. Buttermilk.   Condiments   Onion and garlic salt, seasoned salt, table salt, and sea salt. Canned and packaged gravies. Worcestershire sauce. Tartar sauce. Barbecue sauce. Teriyaki sauce. Soy sauce, including reduced sodium. Steak sauce. Fish sauce. Oyster sauce. Cocktail sauce. Horseradish that you find on the shelf. Regular ketchup and mustard. Meat flavorings and tenderizers. Bouillon cubes. Hot sauce. Tabasco sauce. Marinades. Taco seasonings. Relishes.  Fats and Oils    Regular salad dressings. Salted butter. Margarine. Ghee. Lam fat.   Other   Potato and tortilla chips. Corn chips and puffs. Salted popcorn and pretzels. Canned or dried soups. Pizza. Frozen entrees and pot pies.    The items listed above may not be a complete list of foods and beverages to avoid. Contact your dietitian for more information.     This information is not intended to replace advice given to you by your health care provider. Make sure you discuss any questions you have with your health care provider.     Document Released: 06/09/2003 Document Revised: 01/08/2016 Document Reviewed: 10/22/2014  [employer] Interactive Patient Education ©2016 [employer]

## 2017-03-08 ENCOUNTER — HOSPITAL ENCOUNTER (OUTPATIENT)
Dept: CARDIOLOGY | Facility: HOSPITAL | Age: 67
Discharge: HOME | End: 2017-03-08

## 2017-03-08 ENCOUNTER — HOSPITAL ENCOUNTER (OUTPATIENT)
Dept: CARDIOLOGY | Facility: HOSPITAL | Age: 67
Discharge: HOME | End: 2017-03-08
Admitting: NURSE PRACTITIONER

## 2017-03-08 VITALS
BODY MASS INDEX: 24.11 KG/M2 | WEIGHT: 150 LBS | HEIGHT: 66 IN | SYSTOLIC BLOOD PRESSURE: 162 MMHG | HEART RATE: 82 BPM | DIASTOLIC BLOOD PRESSURE: 100 MMHG

## 2017-03-08 DIAGNOSIS — R07.89 CHEST TIGHTNESS: ICD-10-CM

## 2017-03-08 DIAGNOSIS — R93.1 ABNORMAL CT SCAN OF HEART: ICD-10-CM

## 2017-03-08 DIAGNOSIS — I10 ESSENTIAL HYPERTENSION: ICD-10-CM

## 2017-03-08 LAB
ASCENDING AORTA: 3.6 CM
BH CV ECHO MEAS - ACS: 2.2 CM
BH CV ECHO MEAS - AO MAX PG (FULL): 4.7 MMHG
BH CV ECHO MEAS - AO MAX PG: 8.1 MMHG
BH CV ECHO MEAS - AO MEAN PG (FULL): 2.4 MMHG
BH CV ECHO MEAS - AO MEAN PG: 4.1 MMHG
BH CV ECHO MEAS - AO ROOT AREA (BSA CORRECTED): 2.2
BH CV ECHO MEAS - AO ROOT AREA: 11.6 CM^2
BH CV ECHO MEAS - AO ROOT DIAM: 3.8 CM
BH CV ECHO MEAS - AO V2 MAX: 142.3 CM/SEC
BH CV ECHO MEAS - AO V2 MEAN: 93.9 CM/SEC
BH CV ECHO MEAS - AO V2 VTI: 26.4 CM
BH CV ECHO MEAS - AVA(I,A): 2.2 CM^2
BH CV ECHO MEAS - AVA(I,D): 2.2 CM^2
BH CV ECHO MEAS - AVA(V,A): 2.1 CM^2
BH CV ECHO MEAS - AVA(V,D): 2.1 CM^2
BH CV ECHO MEAS - BSA(HAYCOCK): 1.8 M^2
BH CV ECHO MEAS - BSA: 1.8 M^2
BH CV ECHO MEAS - BZI_BMI: 24.2 KILOGRAMS/M^2
BH CV ECHO MEAS - BZI_METRIC_HEIGHT: 167.6 CM
BH CV ECHO MEAS - BZI_METRIC_WEIGHT: 68 KG
BH CV ECHO MEAS - CONTRAST EF (2CH): 65.2 ML/M^2
BH CV ECHO MEAS - CONTRAST EF 4CH: 64.5 ML/M^2
BH CV ECHO MEAS - EDV(MOD-SP2): 89 ML
BH CV ECHO MEAS - EDV(MOD-SP4): 76 ML
BH CV ECHO MEAS - EDV(TEICH): 101.7 ML
BH CV ECHO MEAS - EF(CUBED): 71.8 %
BH CV ECHO MEAS - EF(MOD-SP2): 65.2 %
BH CV ECHO MEAS - EF(MOD-SP4): 64.5 %
BH CV ECHO MEAS - EF(TEICH): 63.5 %
BH CV ECHO MEAS - ESV(MOD-SP2): 31 ML
BH CV ECHO MEAS - ESV(MOD-SP4): 27 ML
BH CV ECHO MEAS - ESV(TEICH): 37.1 ML
BH CV ECHO MEAS - FS: 34.4 %
BH CV ECHO MEAS - IVS/LVPW: 1
BH CV ECHO MEAS - IVSD: 1.1 CM
BH CV ECHO MEAS - LAT PEAK E' VEL: 8 CM/SEC
BH CV ECHO MEAS - LV DIASTOLIC VOL/BSA (35-75): 42.9 ML/M^2
BH CV ECHO MEAS - LV MASS(C)D: 180.9 GRAMS
BH CV ECHO MEAS - LV MASS(C)DI: 102.2 GRAMS/M^2
BH CV ECHO MEAS - LV MAX PG: 3.4 MMHG
BH CV ECHO MEAS - LV MEAN PG: 1.7 MMHG
BH CV ECHO MEAS - LV SYSTOLIC VOL/BSA (12-30): 15.3 ML/M^2
BH CV ECHO MEAS - LV V1 MAX: 91.7 CM/SEC
BH CV ECHO MEAS - LV V1 MEAN: 61.6 CM/SEC
BH CV ECHO MEAS - LV V1 VTI: 18 CM
BH CV ECHO MEAS - LVIDD: 4.7 CM
BH CV ECHO MEAS - LVIDS: 3.1 CM
BH CV ECHO MEAS - LVLD AP2: 7.2 CM
BH CV ECHO MEAS - LVLD AP4: 7.1 CM
BH CV ECHO MEAS - LVLS AP2: 6.2 CM
BH CV ECHO MEAS - LVLS AP4: 6.4 CM
BH CV ECHO MEAS - LVOT AREA (M): 3.5 CM^2
BH CV ECHO MEAS - LVOT AREA: 3.3 CM^2
BH CV ECHO MEAS - LVOT DIAM: 2.1 CM
BH CV ECHO MEAS - LVPWD: 1.1 CM
BH CV ECHO MEAS - MED PEAK E' VEL: 7 CM/SEC
BH CV ECHO MEAS - MR MAX PG: 20.8 MMHG
BH CV ECHO MEAS - MR MAX VEL: 228 CM/SEC
BH CV ECHO MEAS - MV A DUR: 0.11 SEC
BH CV ECHO MEAS - MV A MAX VEL: 63.5 CM/SEC
BH CV ECHO MEAS - MV DEC SLOPE: 247 CM/SEC^2
BH CV ECHO MEAS - MV DEC TIME: 0.19 SEC
BH CV ECHO MEAS - MV E MAX VEL: 50.4 CM/SEC
BH CV ECHO MEAS - MV E/A: 0.79
BH CV ECHO MEAS - MV MAX PG: 2.3 MMHG
BH CV ECHO MEAS - MV MEAN PG: 1.2 MMHG
BH CV ECHO MEAS - MV P1/2T MAX VEL: 50.9 CM/SEC
BH CV ECHO MEAS - MV P1/2T: 60.4 MSEC
BH CV ECHO MEAS - MV V2 MAX: 76 CM/SEC
BH CV ECHO MEAS - MV V2 MEAN: 53.3 CM/SEC
BH CV ECHO MEAS - MV V2 VTI: 21 CM
BH CV ECHO MEAS - MVA P1/2T LCG: 4.3 CM^2
BH CV ECHO MEAS - MVA(P1/2T): 3.6 CM^2
BH CV ECHO MEAS - MVA(VTI): 2.8 CM^2
BH CV ECHO MEAS - PA ACC TIME: 0.12 SEC
BH CV ECHO MEAS - PA MAX PG (FULL): 0.65 MMHG
BH CV ECHO MEAS - PA MAX PG: 2.1 MMHG
BH CV ECHO MEAS - PA PR(ACCEL): 23.5 MMHG
BH CV ECHO MEAS - PA V2 MAX: 71.9 CM/SEC
BH CV ECHO MEAS - PULM A REVS DUR: 0.1 SEC
BH CV ECHO MEAS - PULM A REVS VEL: 25.7 CM/SEC
BH CV ECHO MEAS - PULM DIAS VEL: 28.6 CM/SEC
BH CV ECHO MEAS - PULM S/D: 1.6
BH CV ECHO MEAS - PULM SYS VEL: 45.6 CM/SEC
BH CV ECHO MEAS - PVA(V,A): 3.3 CM^2
BH CV ECHO MEAS - PVA(V,D): 3.3 CM^2
BH CV ECHO MEAS - QP/QS: 0.91
BH CV ECHO MEAS - RAP SYSTOLE: 3 MMHG
BH CV ECHO MEAS - RV MAX PG: 1.4 MMHG
BH CV ECHO MEAS - RV MEAN PG: 0.77 MMHG
BH CV ECHO MEAS - RV V1 MAX: 59.7 CM/SEC
BH CV ECHO MEAS - RV V1 MEAN: 41.4 CM/SEC
BH CV ECHO MEAS - RV V1 VTI: 13.7 CM
BH CV ECHO MEAS - RVOT AREA: 3.9 CM^2
BH CV ECHO MEAS - RVOT DIAM: 2.2 CM
BH CV ECHO MEAS - RVSP: 23.2 MMHG
BH CV ECHO MEAS - SI(AO): 172.6 ML/M^2
BH CV ECHO MEAS - SI(CUBED): 41.7 ML/M^2
BH CV ECHO MEAS - SI(LVOT): 33.6 ML/M^2
BH CV ECHO MEAS - SI(MOD-SP2): 32.8 ML/M^2
BH CV ECHO MEAS - SI(MOD-SP4): 27.7 ML/M^2
BH CV ECHO MEAS - SI(TEICH): 36.5 ML/M^2
BH CV ECHO MEAS - SUP REN AO DIAM: 2.1 CM
BH CV ECHO MEAS - SV(AO): 305.5 ML
BH CV ECHO MEAS - SV(CUBED): 73.9 ML
BH CV ECHO MEAS - SV(LVOT): 59.5 ML
BH CV ECHO MEAS - SV(MOD-SP2): 58 ML
BH CV ECHO MEAS - SV(MOD-SP4): 49 ML
BH CV ECHO MEAS - SV(RVOT): 54 ML
BH CV ECHO MEAS - SV(TEICH): 64.5 ML
BH CV ECHO MEAS - TAPSE (>1.6): 2.3 CM2
BH CV ECHO MEAS - TR MAX VEL: 224.8 CM/SEC
BH CV STRESS BP STAGE 1: NORMAL
BH CV STRESS BP STAGE 2: NORMAL
BH CV STRESS DURATION MIN STAGE 1: 3
BH CV STRESS DURATION MIN STAGE 2: 3
BH CV STRESS DURATION SEC STAGE 1: 0
BH CV STRESS DURATION SEC STAGE 2: 0
BH CV STRESS GRADE STAGE 1: 10
BH CV STRESS GRADE STAGE 2: 12
BH CV STRESS HR STAGE 1: 131
BH CV STRESS HR STAGE 2: 139
BH CV STRESS METS STAGE 1: 5
BH CV STRESS METS STAGE 2: 7.5
BH CV STRESS PROTOCOL 1: NORMAL
BH CV STRESS RECOVERY BP: NORMAL MMHG
BH CV STRESS RECOVERY HR: 91 BPM
BH CV STRESS SPEED STAGE 1: 1.7
BH CV STRESS SPEED STAGE 2: 2.5
BH CV STRESS STAGE 1: 1
BH CV STRESS STAGE 2: 2
BH CV XLRA - RV BASE: 3 CM
BH CV XLRA - TDI S': 13 CM/SEC
E/E' RATIO: 8
LEFT ATRIUM VOLUME INDEX: 22 ML/M2
LV EF 2D ECHO EST: 65 %
LV EF NUC BP: 72 %
MAXIMAL PREDICTED HEART RATE: 154 BPM
PERCENT MAX PREDICTED HR: 90.26 %
SINUS: 3.4 CM
STJ: 2.8 CM
STRESS BASELINE BP: NORMAL MMHG
STRESS BASELINE HR: 76 BPM
STRESS PERCENT HR: 106 %
STRESS POST ESTIMATED WORKLOAD: 7 METS
STRESS POST EXERCISE DUR MIN: 6 MIN
STRESS POST EXERCISE DUR SEC: 0 SEC
STRESS POST PEAK BP: NORMAL MMHG
STRESS POST PEAK HR: 139 BPM
STRESS TARGET HR: 131 BPM

## 2017-03-08 PROCEDURE — 93016 CV STRESS TEST SUPVJ ONLY: CPT | Performed by: INTERNAL MEDICINE

## 2017-03-08 PROCEDURE — 93018 CV STRESS TEST I&R ONLY: CPT | Performed by: INTERNAL MEDICINE

## 2017-03-08 PROCEDURE — 0 TECHNETIUM TETROFOSMIN KIT

## 2017-03-08 PROCEDURE — 93306 TTE W/DOPPLER COMPLETE: CPT | Performed by: INTERNAL MEDICINE

## 2017-03-08 PROCEDURE — 93306 TTE W/DOPPLER COMPLETE: CPT

## 2017-03-08 PROCEDURE — 78452 HT MUSCLE IMAGE SPECT MULT: CPT

## 2017-03-08 PROCEDURE — 78452 HT MUSCLE IMAGE SPECT MULT: CPT | Performed by: INTERNAL MEDICINE

## 2017-03-08 PROCEDURE — 93017 CV STRESS TEST TRACING ONLY: CPT

## 2017-03-08 PROCEDURE — A9502 TC99M TETROFOSMIN: HCPCS

## 2017-03-09 ENCOUNTER — TELEPHONE (OUTPATIENT)
Dept: CARDIOLOGY | Facility: CLINIC | Age: 67
End: 2017-03-09

## 2017-03-09 ENCOUNTER — TELEPHONE (OUTPATIENT)
Dept: INTERNAL MEDICINE | Facility: CLINIC | Age: 67
End: 2017-03-09

## 2017-03-09 ENCOUNTER — OFFICE VISIT (OUTPATIENT)
Dept: INTERNAL MEDICINE | Facility: CLINIC | Age: 67
End: 2017-03-09

## 2017-03-09 VITALS
RESPIRATION RATE: 14 BRPM | DIASTOLIC BLOOD PRESSURE: 98 MMHG | BODY MASS INDEX: 24.11 KG/M2 | WEIGHT: 150 LBS | HEIGHT: 66 IN | SYSTOLIC BLOOD PRESSURE: 158 MMHG

## 2017-03-09 DIAGNOSIS — R42 LIGHTHEADEDNESS: Primary | ICD-10-CM

## 2017-03-09 DIAGNOSIS — G44.52 NEW DAILY PERSISTENT HEADACHE: ICD-10-CM

## 2017-03-09 DIAGNOSIS — I10 BENIGN ESSENTIAL HYPERTENSION: ICD-10-CM

## 2017-03-09 PROCEDURE — 99214 OFFICE O/P EST MOD 30 MIN: CPT | Performed by: INTERNAL MEDICINE

## 2017-03-09 RX ORDER — PROPRANOLOL HYDROCHLORIDE 20 MG/1
20 TABLET ORAL 2 TIMES DAILY
Qty: 60 TABLET | Refills: 11 | Status: SHIPPED | OUTPATIENT
Start: 2017-03-09 | End: 2017-03-28 | Stop reason: SDUPTHER

## 2017-03-09 NOTE — TELEPHONE ENCOUNTER
03/09/17  9:24 AM  Jana Page  1950    Home Phone [phone]   Mobile [phone]     This patient calls to follow-up after her appt with Natty Lang on 3/7; and her stress test and echocardiogram on 3/8. Ms. Page states she feels terrible. She is still having lightheadedness today and is not going to work. Today, /98, HR 76. She also reports that she had chest tightness while lying in bed last night which made it difficult to sleep. Last night, /104, HR 77. She denied any pain radiation, SOA, diaphoresis, or nausea. She is currently chest pain free.     Since her appt on 3/7, she has been taking 40mg lisinopril daily and 25mg chlorthalidone daily.    She feels she will need to see a cardiologist.    Yuliya ARRIETA RN

## 2017-03-09 NOTE — TELEPHONE ENCOUNTER
NEVER MIND YOU MUST OF SENT THIS WHILE I WAS ON PHONE WITH PATIENT, THIS IS THE ONE NAME DIDN'T FOLLOW IN COMPUTER

## 2017-03-09 NOTE — TELEPHONE ENCOUNTER
"----- Message from Annalise Pretty sent at 3/9/2017 11:43 AM EST -----  Contact: Patient  Patient called stating she needs to see Dr. Newberry today.  She has been seen in ER twice in last 3 weeks for similar complaints.  She is complaining of dizziness, lightheadedness, elevated BP (165/106 at 10 AM this morning), and arms feeling \"funny.\"  She asks that we tell Dr. Newberry that she is \"pitiful,\" because she is.  We do have some openings tomorrow, but she wants to be seen today.  She will see ARNP if okay with Dr. Newberry.  Please advise.     Patient:  [phone] (cell)  "

## 2017-03-09 NOTE — PATIENT INSTRUCTIONS
1. HTN - better control after the change in medication, but still elevated. I am not sure if the elevated BP is the cause of her headaches and lightheadedness, or if anxiety presents like lightheadedness and manifest with elevated BP. Will check CT scan due to new onset of headaches and persistent lightheadedness. Will continue same medication, add Propranolol 20 mg twice a day, and check BMP and TSH.  2. Lightheadedness - possibly due to elevated BP, or possibly psychogenic lightheadedness. Will try for better BP control. Will also check CT of the head given persistence of her symptoms and new onset headaches.  3. Excessive thirst - most likely due to use of diuretic vs due to anxiety. Will check electrolytes.  4. New onset headaches - tension vs due to elevated BP. Will still refer for the CT scan and try to control her BP better.

## 2017-03-09 NOTE — PROGRESS NOTES
"Subjective   Jana Page is a 66 y.o. female. C/o lighteheadedness. Concerned about her BP.    History of Present Illness   Patient c/o feeling lightheaded and weak in her arms. Symptoms started several months ago. Patient describes symptoms as lightheadedness. Patient had few similar episodes since the onset. Last few days her lightheadedness had ward constant, usually worse as the day goes on. Patient reports that no particular movement, activity or position triggers symptoms.   Jana Page denies  changes in hearing associated with dizzy spells. Denies chest pain or pressure, shortness of breath or palpitations. Patient reports minimal nausea and lack of appetite.Patient had been seen by me on 02/17/2017 and at that time reported much improvement in her lightheadedness. She had made 2 ED visit since then as her BP was elevated and she became every anxious reporting feeling like \"nerve wreck \" while in ED. There were few changed in her BP: ED started Clonidine, that was immediately discontinued by cardiac NP: she changed her Triamterene/HCVTZ to Chlorthalidone and patient had veryb thirsty since. All labs were fine, also had normal stress test and ECHO.  Pertinent PMH includes no major health issues.  Patient also c/o generalized headaches, \"really bad\" since last wknd. Unable to explain where the head hurts. No improvement with Tylenol or Excedrin.    Visit Vitals   • /92 (BP Location: Left arm, Patient Position: Sitting, Cuff Size: Adult)   • Resp 14   • Ht 66\" (167.6 cm)   • Wt 150 lb (68 kg)   • BMI 24.21 kg/m2         Current Outpatient Prescriptions:   •  aspirin 81 MG EC tablet, Take 81 mg by mouth Daily., Disp: , Rfl:   •  atorvastatin (LIPITOR) 20 MG tablet, Take 1 tablet by mouth Daily., Disp: 30 tablet, Rfl: 11  •  Calcium Carb-Cholecalciferol 600-200 MG-UNIT tablet, Take  by mouth., Disp: , Rfl:   •  chlorthalidone (HYGROTON) 25 MG tablet, Take 1 tablet by mouth Daily., Disp: 30 " tablet, Rfl: 1  •  Cholecalciferol (VITAMIN D) 1000 UNITS tablet, Take  by mouth., Disp: , Rfl:   •  Coenzyme Q10 (CO Q 10) 10 MG capsule, Take  by mouth., Disp: , Rfl:   •  fluticasone (FLONASE) 50 MCG/ACT nasal spray, 2 sprays into each nostril daily. Administer 2 sprays in each nostril for each dose., Disp: , Rfl:   •  lisinopril (PRINIVIL,ZESTRIL) 40 MG tablet, TAKE ONE TABLET BY MOUTH DAILY, Disp: 90 tablet, Rfl: 3  •  Misc Natural Products (OSTEO BI-FLEX JOINT SHIELD) tablet, Take  by mouth., Disp: , Rfl:   •  Multiple Vitamin (MULTIVITAMIN) capsule, Take  by mouth., Disp: , Rfl:   •  Probiotic Product (PRO-BIOTIC BLEND) capsule, Take  by mouth., Disp: , Rfl:   •  psyllium (METAMUCIL) 58.6 % packet, Take 1 packet by mouth Daily., Disp: , Rfl:   The following portions of the patient's history were reviewed and updated as appropriate: allergies, current medications, past family history, past medical history, past social history, past surgical history and problem list.    Review of Systems   Constitutional: Positive for activity change, appetite change and fatigue.   Eyes: Negative.    Respiratory: Positive for chest tightness.    Gastrointestinal: Positive for nausea.   Endocrine: Negative.    Genitourinary: Negative.    Musculoskeletal: Negative.    Allergic/Immunologic: Negative.    Neurological: Positive for dizziness, weakness, light-headedness and headaches.   Hematological: Negative.    Psychiatric/Behavioral: Negative.        Objective   Physical Exam   Constitutional: She is oriented to person, place, and time. She appears well-developed and well-nourished.   HENT:   Head: Normocephalic and atraumatic.   Right Ear: Tympanic membrane, external ear and ear canal normal.   Left Ear: Tympanic membrane, external ear and ear canal normal.   Nose: Nose normal. Right sinus exhibits no maxillary sinus tenderness and no frontal sinus tenderness. Left sinus exhibits no maxillary sinus tenderness and no frontal  sinus tenderness.   Mouth/Throat: Uvula is midline, oropharynx is clear and moist and mucous membranes are normal.   Eyes: Conjunctivae and EOM are normal. Pupils are equal, round, and reactive to light. Right eye exhibits no discharge. Left eye exhibits no discharge. No scleral icterus.   Neck: Neck supple. No JVD present.   Cardiovascular: Normal rate, regular rhythm and normal heart sounds.  Exam reveals no gallop and no friction rub.    No murmur heard.  Pulmonary/Chest: Effort normal and breath sounds normal. She has no wheezes. She has no rales.   Musculoskeletal: She exhibits no edema.   Lymphadenopathy:     She has no cervical adenopathy.   Neurological: She is alert and oriented to person, place, and time. No cranial nerve deficit.   Skin: Skin is warm and dry. No rash noted.   Psychiatric: She has a normal mood and affect. Her behavior is normal.   Vitals reviewed.      Assessment/Plan   Jana was seen today for dizziness and polydipsia.    Diagnoses and all orders for this visit:    Lightheadedness  -     CT Chest Without Contrast    Benign essential hypertension  -     Basic Metabolic Panel; Future  -     TSH; Future    1. HTN - better control after the change in medication, but still elevated. I am not sure if the elevated BP is the cause of her headaches and lightheadedness, or if anxiety presents like lightheadedness and manifest with elevated BP. Will check CT scan due to new onset of headaches and persistent lightheadedness. Will continue same medication, add Propranolol 20 mg twice a day, and check BMP and TSH. Patient to bring her manometer next visit.  2. Lightheadedness - possibly due to elevated BP, or possibly psychogenic lightheadedness. Will try for better BP control. Will also check CT of the head given persistence of her symptoms and new onset headaches.  3. Excessive thirst - most likely due to use of diuretic vs due to anxiety. Will check electrolytes.  4. New onset headaches - tension  vs due to elevated BP. Will still refer for the CT scan and try to control her BP better.

## 2017-03-09 NOTE — TELEPHONE ENCOUNTER
Nuclear Stress Test Results:    · Myocardial perfusion imaging indicates a normal myocardial perfusion study with no evidence of ischemia.  · Left ventricular ejection fraction is hyperdynamic (Calculated EF > 70%).  · Impressions are consistent with a low risk study.  · There is no prior study available for comparison.        Echocardiogram Results:     · Left ventricular function is normal. Estimated EF = 65%.  · Mild tricuspid valve regurgitation is present.  · Estimated right ventricular systolic pressure from tricuspid regurgitation is normal (<35 mmHg).      I spoke with patient via phone. She verbalized understanding. She is on good medication for primary prevention. I reviewed with Dr. Jd Mcmahon.     The patient states that she is still not feeling well. She is having the same symptoms of dizziness, chest tightness, arm pain. I have recommended that she contact her PCP and present to ED for further evaluation.

## 2017-03-10 ENCOUNTER — LAB (OUTPATIENT)
Dept: INTERNAL MEDICINE | Facility: CLINIC | Age: 67
End: 2017-03-10

## 2017-03-10 DIAGNOSIS — I10 BENIGN ESSENTIAL HYPERTENSION: ICD-10-CM

## 2017-03-10 LAB — TSH SERPL-ACNC: 0.82 MIU/ML (ref 0.27–4.2)

## 2017-03-11 LAB
BUN SERPL-MCNC: 15 MG/DL (ref 8–23)
BUN/CREAT SERPL: 16.3 (ref 7–25)
CALCIUM SERPL-MCNC: 10 MG/DL (ref 8.6–10.5)
CHLORIDE SERPL-SCNC: 91 MMOL/L (ref 98–107)
CO2 SERPL-SCNC: 21.9 MMOL/L (ref 22–29)
CREAT SERPL-MCNC: 0.92 MG/DL (ref 0.57–1)
GLUCOSE SERPL-MCNC: 89 MG/DL (ref 65–99)
POTASSIUM SERPL-SCNC: 3.7 MMOL/L (ref 3.5–5.2)
SODIUM SERPL-SCNC: 136 MMOL/L (ref 136–145)

## 2017-03-13 ENCOUNTER — OFFICE VISIT (OUTPATIENT)
Dept: INTERNAL MEDICINE | Facility: CLINIC | Age: 67
End: 2017-03-13

## 2017-03-13 VITALS
DIASTOLIC BLOOD PRESSURE: 66 MMHG | SYSTOLIC BLOOD PRESSURE: 122 MMHG | HEIGHT: 66 IN | WEIGHT: 159 LBS | BODY MASS INDEX: 25.55 KG/M2

## 2017-03-13 DIAGNOSIS — R42 LIGHTHEADEDNESS: ICD-10-CM

## 2017-03-13 DIAGNOSIS — I10 BENIGN ESSENTIAL HYPERTENSION: Primary | ICD-10-CM

## 2017-03-13 PROCEDURE — 99213 OFFICE O/P EST LOW 20 MIN: CPT | Performed by: INTERNAL MEDICINE

## 2017-03-13 RX ORDER — ESCITALOPRAM OXALATE 10 MG/1
10 TABLET ORAL DAILY
Qty: 30 TABLET | Refills: 11 | Status: SHIPPED | OUTPATIENT
Start: 2017-03-13 | End: 2017-03-28 | Stop reason: SDUPTHER

## 2017-03-13 RX ORDER — ESCITALOPRAM OXALATE 10 MG/1
10 TABLET ORAL DAILY
Qty: 30 TABLET | Refills: 11 | Status: SHIPPED | OUTPATIENT
Start: 2017-03-13 | End: 2017-03-13 | Stop reason: SDUPTHER

## 2017-03-13 NOTE — PROGRESS NOTES
Subjective   Jana Page is a 66 y.o. female. Here for lightheadedness and HTN f/u.    History of Present Illness   Jana Page 66 y.o. female presents today for and lightheadedness f/u. Last was seen on 03/09/3027 and on that visit medication was changed due to inadequate control.We had added Pravastatin 20 mg twice a day..  Patient is compliant with treatment and denies  side effects. Patient states that change in treatment helped to achieve better control of the symptoms.Had a great day on 02/11/2017, but still with mild lightheadedness yesterday and today.  The following portions of the patient's history were reviewed and updated as appropriate: allergies, current medications, past family history, past medical history, past social history, past surgical history and problem list.    Review of Systems   Constitutional: Negative for chills and fever.   Eyes: Negative for pain and redness.   Respiratory: Negative for cough and shortness of breath.    Cardiovascular: Negative for chest pain and leg swelling.   Neurological: Positive for light-headedness. Negative for dizziness and headaches.       Objective   Physical Exam   Constitutional: She is oriented to person, place, and time. She appears well-developed and well-nourished.   HENT:   Head: Normocephalic and atraumatic.   Right Ear: Tympanic membrane, external ear and ear canal normal.   Left Ear: Tympanic membrane, external ear and ear canal normal.   Nose: Nose normal. Right sinus exhibits no maxillary sinus tenderness and no frontal sinus tenderness. Left sinus exhibits no maxillary sinus tenderness and no frontal sinus tenderness.   Mouth/Throat: Uvula is midline, oropharynx is clear and moist and mucous membranes are normal.   Eyes: Conjunctivae and EOM are normal. Pupils are equal, round, and reactive to light. Right eye exhibits no discharge. Left eye exhibits no discharge. No scleral icterus.   Neck: Neck supple. No JVD present.    Cardiovascular: Normal rate, regular rhythm and normal heart sounds.  Exam reveals no gallop and no friction rub.    No murmur heard.  Pulmonary/Chest: Effort normal and breath sounds normal. She has no wheezes. She has no rales.   Musculoskeletal: She exhibits no edema.   Lymphadenopathy:     She has no cervical adenopathy.   Neurological: She is alert and oriented to person, place, and time. No cranial nerve deficit.   Skin: Skin is warm and dry. No rash noted.   Psychiatric: She has a normal mood and affect. Her behavior is normal.   Vitals reviewed.      Assessment/Plan   Diagnoses and all orders for this visit:    Benign essential hypertension    Lightheadedness  -     escitalopram (LEXAPRO) 10 MG tablet; Take 1 tablet by mouth Daily.    1. HTN - control had improved, perfect BP today. Patient brought her manometer to the office and her BP taken with home manometer shows numbers 10 mm higher than out numbers.Stop home measurements.  2. Lightheadedness - better with use of Propranolol, but still an issue. Most likely psychogenic lightheadedness. Start Escitalopram 10 mg a day. Reevaluate in 5-6 weeks.

## 2017-03-14 ENCOUNTER — TELEPHONE (OUTPATIENT)
Dept: CARDIOLOGY | Facility: CLINIC | Age: 67
End: 2017-03-14

## 2017-03-14 ENCOUNTER — TELEPHONE (OUTPATIENT)
Dept: INTERNAL MEDICINE | Facility: CLINIC | Age: 67
End: 2017-03-14

## 2017-03-14 DIAGNOSIS — I10 ESSENTIAL HYPERTENSION: Primary | ICD-10-CM

## 2017-03-14 DIAGNOSIS — F41.9 ANXIETY: Primary | ICD-10-CM

## 2017-03-14 RX ORDER — ALPRAZOLAM 0.25 MG/1
0.25 TABLET ORAL 2 TIMES DAILY PRN
Qty: 40 TABLET | Refills: 0 | OUTPATIENT
Start: 2017-03-14 | End: 2017-07-05

## 2017-03-14 NOTE — TELEPHONE ENCOUNTER
Patient is very anxious before the CT test. She had been lightheaded again and had been checking her BP again: reports numbers as high as 150s/100. I had reminded her that her manometer shows falsly elevated BP numbers by at least 10 mm, as it was checked in the office. She again wonders if teddy needs to see NP at [facility] and had left her a phone message. I had repeated for the patient that she already had numerous normal cardiac tests and has no problems. She thinks that she might have problems at the Ct due to claustrophobia.  Impression: anxiety. Will start Alprazolam0.25 mg BID as needed. Warned possible side effects. Recommended to have a ride home from CT. Meds called to her [facility] pharmacy: #40, no ref.

## 2017-03-14 NOTE — TELEPHONE ENCOUNTER
----- Message from Annalise Pretty sent at 3/14/2017 12:22 PM EDT -----  Contact: Patient  Patient called and wants to speak with Dr. Newberry please before she has the head CT.  Please advise.      Patient:  [phone]  (She asks if Dr. Newberry can call after 3).

## 2017-03-14 NOTE — TELEPHONE ENCOUNTER
Pt called because she got a reminder call about her appt on 3/16/17 for her BP/EKG. She is wanting to know if she still needs to come in since she has spoken with you since that appt was made. She did see her PCP (Rohith) yesterday. His notes are in EPIC if you want to look them over........lew

## 2017-03-15 ENCOUNTER — HOSPITAL ENCOUNTER (OUTPATIENT)
Dept: CT IMAGING | Facility: HOSPITAL | Age: 67
Discharge: HOME | End: 2017-03-15
Admitting: INTERNAL MEDICINE

## 2017-03-15 PROCEDURE — 70450 CT HEAD/BRAIN W/O DYE: CPT

## 2017-03-15 NOTE — TELEPHONE ENCOUNTER
I returned patient's call and left a message for further discussion.    I recently saw the patient the office on 3/7/17.  I discontinued the clonidine and Maxzide.  I added chlorthalidone 25 mg 1 tablet daily to her regimen and asked her to continue with lisinopril 40 mg daily.  She is scheduled tomorrow for blood pressure check in the office and is supposed to have a repeat basic metabolic panel the hospital.    The patient just saw her PCP on 3/13/17.  Her documented blood pressure was 122/66.  Her blood pressure machine was not felt to be accurate and she was recommended to stop taking home measurements.    The patient had a repeat basic metabolic panel on 3/10/17 which showed normal BUN, creatinine, and potassium.  Her chloride was low at 91.     On 3/4/17 her chloride was normal at 102.

## 2017-03-21 ENCOUNTER — TELEPHONE (OUTPATIENT)
Dept: INTERNAL MEDICINE | Facility: CLINIC | Age: 67
End: 2017-03-21

## 2017-03-21 ENCOUNTER — LAB (OUTPATIENT)
Dept: LAB | Facility: HOSPITAL | Age: 67
End: 2017-03-21

## 2017-03-21 DIAGNOSIS — R42 LIGHTHEADEDNESS: Primary | ICD-10-CM

## 2017-03-21 DIAGNOSIS — R42 LIGHTHEADEDNESS: ICD-10-CM

## 2017-03-21 DIAGNOSIS — I10 ESSENTIAL HYPERTENSION: ICD-10-CM

## 2017-03-21 DIAGNOSIS — R07.89 CHEST TIGHTNESS: ICD-10-CM

## 2017-03-21 LAB
ANION GAP SERPL CALCULATED.3IONS-SCNC: 12.4 MMOL/L
BUN BLD-MCNC: 11 MG/DL (ref 8–23)
BUN/CREAT SERPL: 16.7 (ref 7–25)
CALCIUM SPEC-SCNC: 9.8 MG/DL (ref 8.6–10.5)
CHLORIDE SERPL-SCNC: 98 MMOL/L (ref 98–107)
CO2 SERPL-SCNC: 26.6 MMOL/L (ref 22–29)
CREAT BLD-MCNC: 0.66 MG/DL (ref 0.57–1)
GFR SERPL CREATININE-BSD FRML MDRD: 90 ML/MIN/1.73
GLUCOSE BLD-MCNC: 104 MG/DL (ref 65–99)
POTASSIUM BLD-SCNC: 3 MMOL/L (ref 3.5–5.2)
SODIUM BLD-SCNC: 137 MMOL/L (ref 136–145)

## 2017-03-21 PROCEDURE — 80048 BASIC METABOLIC PNL TOTAL CA: CPT

## 2017-03-21 NOTE — TELEPHONE ENCOUNTER
Called patient and discussed this at length. She takes 3 B/P meds and the new Lexapro. No real pattern to lightheadedness except it does seem to hit at 5AM in mornings but this does not occur everyday.     Verbalized understanding to have ENT evaluation. Has seen Dr. Bush in past and prefers to see him since he has seen her in past. Will let Mindi know.

## 2017-03-21 NOTE — TELEPHONE ENCOUNTER
Dr Newberry I left mess for patient yesterday apparently she now is home from work due to this issue.

## 2017-03-21 NOTE — TELEPHONE ENCOUNTER
I do not think that meds are to blame, as she had the dizziness before started on those. I would suggest to go to ENT as often lightheadedness is due to inner ear. Will refer to

## 2017-03-21 NOTE — TELEPHONE ENCOUNTER
----- Message from Lynn Montana sent at 3/21/2017  8:22 AM EDT -----  Pt dizzy and home from work today.  Pt is concerned this could be caused by her meds.  She requests a call back to discuss.  Please advise      Pt#[phone]

## 2017-03-21 NOTE — TELEPHONE ENCOUNTER
----- Message from Izzy Santillan sent at 3/20/2017 10:01 AM EDT -----  Contact: pt - Dr becerra's pt - RE: discuss B/P  Pt calling and would like a return call regarding pt's B/P med's. Pt informs that pt is on 3 and pt informs that 2 have changed. Pt informs that couple of have stickers that inform stating may cause dizziness. Pt spent yesterday laying down. Pt would like to discuss if light headedness if from B/P or from B/P med's. Pt informs has appt in April. Could you please cll pt pt discuss? Please advise.      Pt # [phone]

## 2017-03-21 NOTE — TELEPHONE ENCOUNTER
I spoke with patient via phone. She is going to follow up with her PCP and will have blood work done there. Patient said Dr. Newberry will refill the chlorthalidone.    I explained to the patient that a ditch 1 her to have a repeat basic metabolic panel completed at the hospital.  She said she will do so and I have placed the order.

## 2017-03-22 ENCOUNTER — TELEPHONE (OUTPATIENT)
Dept: CARDIOLOGY | Facility: CLINIC | Age: 67
End: 2017-03-22

## 2017-03-22 DIAGNOSIS — E87.6 HYPOKALEMIA: Primary | ICD-10-CM

## 2017-03-22 NOTE — TELEPHONE ENCOUNTER
The patient had a basic metabolic panel 3/21/17 which showed a stable BUN and creatinine.  Her potassium is low at 3.0. I have left a message for patient to return my call.    Patient was recently started on chlorthalidone for better blood pressure control.  I will recommend starting her on a potassium supplementation.

## 2017-03-23 ENCOUNTER — TELEPHONE (OUTPATIENT)
Dept: CARDIOLOGY | Facility: HOSPITAL | Age: 67
End: 2017-03-23

## 2017-03-23 DIAGNOSIS — E87.6 HYPOKALEMIA: Primary | ICD-10-CM

## 2017-03-23 RX ORDER — POTASSIUM CHLORIDE 20 MEQ/1
20 TABLET, EXTENDED RELEASE ORAL DAILY
Qty: 30 TABLET | Refills: 1 | Status: SHIPPED | OUTPATIENT
Start: 2017-03-23 | End: 2017-04-12

## 2017-03-23 NOTE — TELEPHONE ENCOUNTER
Pt returned your call. She said that you can leave a v//m if you want. She is at work so may not be able to answer.....Cecile

## 2017-03-23 NOTE — TELEPHONE ENCOUNTER
The patient and discussed with her that her potassium was low.  Natty Portillo started her on chlorthalidone.  Natty I sent in a prescription for potassium 20 mEq daily.  She is going to take the potassium and come back in 1 week for repeat BMP.  At that point, we will defer to her primary care physician for management of her blood pressure.  The patient indicated that she understood.

## 2017-03-24 ENCOUNTER — TELEPHONE (OUTPATIENT)
Dept: INTERNAL MEDICINE | Facility: CLINIC | Age: 67
End: 2017-03-24

## 2017-03-24 NOTE — TELEPHONE ENCOUNTER
I had called her potassium 2 pills 10 meq each to the pharmacy, hope we had told her so. Now I do not want her to take more then 2 pills a day (total dose 20 mg a day).

## 2017-03-24 NOTE — TELEPHONE ENCOUNTER
----- Message from Annalise Pretty sent at 3/24/2017  3:06 PM EDT -----  Contact: Patient  Patient called stating her BP is still running high in the morning, headache, dizzy.  Feels better in the afternoon.  States she has been taking BP but had no readings with her to give to me.  States BP usually runs 152 to 159 / 110 to 100.  Faithfully been taking BP meds.  Dr. Yumiko CORRAL's NP started patient yesterday on potassium.  If there anything else to do about the hypertension and other symptoms?  Please advise.      Patient:  [phone]    Pharmacy:  [facility] [address] [place] [address] [address] [facility] [address]  - [phone] [facility] [phone] FX

## 2017-03-24 NOTE — TELEPHONE ENCOUNTER
Spoke with patient for 8 mins on phone regarding blood pressure and as well potassium med. She states she did not know she had a script of potassium from Dr Newberry. As well she is still feeling bad and having dizziness.

## 2017-03-28 DIAGNOSIS — I10 BENIGN ESSENTIAL HYPERTENSION: ICD-10-CM

## 2017-03-28 DIAGNOSIS — R42 LIGHTHEADEDNESS: ICD-10-CM

## 2017-03-28 DIAGNOSIS — I10 ESSENTIAL HYPERTENSION: ICD-10-CM

## 2017-03-28 DIAGNOSIS — E78.00 HYPERCHOLESTEROLEMIA: ICD-10-CM

## 2017-03-28 RX ORDER — ESCITALOPRAM OXALATE 10 MG/1
10 TABLET ORAL DAILY
Qty: 30 TABLET | Refills: 11 | Status: SHIPPED | OUTPATIENT
Start: 2017-03-28 | End: 2017-04-12

## 2017-03-28 RX ORDER — ATORVASTATIN CALCIUM 20 MG/1
20 TABLET, FILM COATED ORAL DAILY
Qty: 30 TABLET | Refills: 11 | Status: SHIPPED | OUTPATIENT
Start: 2017-03-28 | End: 2018-05-10 | Stop reason: SDUPTHER

## 2017-03-28 RX ORDER — PROPRANOLOL HYDROCHLORIDE 20 MG/1
20 TABLET ORAL 2 TIMES DAILY
Qty: 60 TABLET | Refills: 11 | Status: SHIPPED | OUTPATIENT
Start: 2017-03-28 | End: 2017-06-06 | Stop reason: SDUPTHER

## 2017-03-28 RX ORDER — LISINOPRIL 40 MG/1
40 TABLET ORAL DAILY
Qty: 90 TABLET | Refills: 3 | Status: SHIPPED | OUTPATIENT
Start: 2017-03-28 | End: 2017-07-05

## 2017-03-28 RX ORDER — CHLORTHALIDONE 25 MG/1
25 TABLET ORAL DAILY
Qty: 30 TABLET | Refills: 11 | Status: SHIPPED | OUTPATIENT
Start: 2017-03-28 | End: 2017-04-12

## 2017-03-28 NOTE — TELEPHONE ENCOUNTER
----- Message from Annalise Pretty sent at 3/28/2017  3:03 PM EDT -----  Contact: Patient  Patient called informing Dr. Newberry she saw Dr. Patel ENT, today.  She states Dr. Patel is wanting to do a glucose test at the hospital, and an EMG in his office.  She is wanting Dr. Newberry's thoughts on having these tests.  Patient states she is a little lightheaded today.      Also, she had called yesterday to get refills before vacation.  She also needs the following medication which was prescribed by cardiology group.  Patient states cardiology group is turning over all care back to our office.  Please advise.     chlorthalidone (HYGROTON) 25 MG tablet    Patient:  [phone] - [facility]                [phone] - home     [place]  [place] [address] [address]  - [phone] [facility] [phone] FX

## 2017-03-28 NOTE — TELEPHONE ENCOUNTER
----- Message from Avaguera Gutierrez sent at 3/27/2017  1:06 PM EDT -----  Pt would like all of her medications renewed. She is going on vacation next week. Pt would like for someone to call her when the prescriptions are sent to the pharmacy.     escitalopram (LEXAPRO) 10 MG tablet   propranolol (INDERAL) 20 MG tablet   atorvastatin (LIPITOR) 20 MG tablet   lisinopril (PRINIVIL,ZESTRIL) 40 MG tablet     [facility] [place] [address] [address] [facility] [address]  - [phone] [facility] [phone] FX     Pt phone number #[phone]

## 2017-03-30 ENCOUNTER — LAB (OUTPATIENT)
Dept: LAB | Facility: HOSPITAL | Age: 67
End: 2017-03-30

## 2017-03-30 ENCOUNTER — TELEPHONE (OUTPATIENT)
Dept: INTERNAL MEDICINE | Facility: CLINIC | Age: 67
End: 2017-03-30

## 2017-03-30 DIAGNOSIS — E87.6 HYPOKALEMIA: ICD-10-CM

## 2017-03-30 LAB
ANION GAP SERPL CALCULATED.3IONS-SCNC: 13.6 MMOL/L
BUN BLD-MCNC: 12 MG/DL (ref 8–23)
BUN/CREAT SERPL: 15.8 (ref 7–25)
CALCIUM SPEC-SCNC: 9.6 MG/DL (ref 8.6–10.5)
CHLORIDE SERPL-SCNC: 99 MMOL/L (ref 98–107)
CO2 SERPL-SCNC: 26.4 MMOL/L (ref 22–29)
CREAT BLD-MCNC: 0.76 MG/DL (ref 0.57–1)
GFR SERPL CREATININE-BSD FRML MDRD: 76 ML/MIN/1.73
GLUCOSE BLD-MCNC: 108 MG/DL (ref 65–99)
POTASSIUM BLD-SCNC: 3.7 MMOL/L (ref 3.5–5.2)
SODIUM BLD-SCNC: 139 MMOL/L (ref 136–145)

## 2017-03-30 PROCEDURE — 80048 BASIC METABOLIC PNL TOTAL CA: CPT

## 2017-03-30 NOTE — TELEPHONE ENCOUNTER
It is unlikely that the dizziness relates to blood pressure.  If she is concerned I would suggest she see one of the nurse practitioners today or tomorrow.

## 2017-03-30 NOTE — TELEPHONE ENCOUNTER
----- Message from Lynn Montana sent at 3/30/2017  9:06 AM EDT -----  NICK PT-Pt has been having trouble w/ BP and dizziness.  Pt states Dr Newberry has been changing her medications, she has seen ENT and she has glucose test tomorrow at [facility].  She said her BP has been higher in the mornings when she wakes up.  Pt would like to know what can be done about dizziness.   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    Pt# [phone]

## 2017-03-31 ENCOUNTER — TELEPHONE (OUTPATIENT)
Dept: CARDIOLOGY | Facility: CLINIC | Age: 67
End: 2017-03-31

## 2017-03-31 ENCOUNTER — OFFICE VISIT (OUTPATIENT)
Dept: INTERNAL MEDICINE | Facility: CLINIC | Age: 67
End: 2017-03-31

## 2017-03-31 VITALS
BODY MASS INDEX: 25.55 KG/M2 | DIASTOLIC BLOOD PRESSURE: 86 MMHG | RESPIRATION RATE: 14 BRPM | HEIGHT: 66 IN | SYSTOLIC BLOOD PRESSURE: 142 MMHG | WEIGHT: 159 LBS

## 2017-03-31 DIAGNOSIS — I10 ESSENTIAL HYPERTENSION: ICD-10-CM

## 2017-03-31 DIAGNOSIS — Z00.00 HEALTH CARE MAINTENANCE: ICD-10-CM

## 2017-03-31 DIAGNOSIS — R42 LIGHTHEADEDNESS: Primary | ICD-10-CM

## 2017-03-31 DIAGNOSIS — E78.00 HYPERCHOLESTEROLEMIA: ICD-10-CM

## 2017-03-31 LAB
ALBUMIN SERPL-MCNC: 4.22 G/DL (ref 3.4–4.6)
ALBUMIN/GLOB SERPL: 1.6 G/DL
ALP SERPL-CCNC: 64 U/L (ref 46–116)
ALT SERPL W P-5'-P-CCNC: 30 U/L (ref 14–59)
ANION GAP SERPL CALCULATED.3IONS-SCNC: 10 MMOL/L
AST SERPL-CCNC: 18 U/L (ref 7–37)
BILIRUB SERPL-MCNC: 0.7 MG/DL (ref 0.2–1)
BUN BLD-MCNC: 10 MG/DL (ref 6–22)
BUN/CREAT SERPL: 13.9 (ref 7–25)
CALCIUM SPEC-SCNC: 9.2 MG/DL (ref 8.6–10.5)
CHLORIDE SERPL-SCNC: 99 MMOL/L (ref 95–107)
CHOLEST SERPL-MCNC: 140 MG/DL (ref 0–200)
CK SERPL-CCNC: 109 U/L (ref 20–180)
CO2 SERPL-SCNC: 28 MMOL/L (ref 23–32)
CREAT BLD-MCNC: 0.72 MG/DL (ref 0.55–1.02)
GFR SERPL CREATININE-BSD FRML MDRD: 81 ML/MIN/1.73
GLOBULIN UR ELPH-MCNC: 2.6 GM/DL
GLUCOSE BLD-MCNC: 112 MG/DL (ref 70–100)
HDLC SERPL-MCNC: 59 MG/DL (ref 40–81)
LDLC SERPL CALC-MCNC: 58 MG/DL (ref 0–100)
LDLC/HDLC SERPL: 0.98 {RATIO}
POTASSIUM BLD-SCNC: 3.5 MMOL/L (ref 3.3–5.3)
PROT SERPL-MCNC: 6.8 G/DL (ref 6.3–8.4)
SODIUM BLD-SCNC: 137 MMOL/L (ref 136–145)
TRIGL SERPL-MCNC: 116 MG/DL (ref 0–150)
VLDLC SERPL-MCNC: 23.2 MG/DL

## 2017-03-31 PROCEDURE — 80053 COMPREHEN METABOLIC PANEL: CPT | Performed by: NURSE PRACTITIONER

## 2017-03-31 PROCEDURE — 80061 LIPID PANEL: CPT | Performed by: NURSE PRACTITIONER

## 2017-03-31 PROCEDURE — 99213 OFFICE O/P EST LOW 20 MIN: CPT | Performed by: NURSE PRACTITIONER

## 2017-03-31 NOTE — TELEPHONE ENCOUNTER
I called the patient and left a message regarding her lab work, which was normal.  I've asked her to continue her current plan and follow-up with her primary care physician regarding her blood pressure.

## 2017-03-31 NOTE — PROGRESS NOTES
Subjective   Jana Page is a 66 y.o. female.     HPI Comments: She is waking up at 4 am with dizziness. She has had cardiac workup. She has recent imaging of brain and normal. She is due to see ENT in next couple weeks for evaluation of middle ear. She is going tomorrow to have glucose intolerance testing.    Dizziness   This is a new problem. The current episode started more than 1 month ago (2 months ago ). The problem occurs intermittently. The problem has been waxing and waning. Associated symptoms include headaches. Pertinent negatives include no abdominal pain, chest pain, coughing, fatigue, fever, nausea, numbness, vomiting or weakness. Nothing aggravates the symptoms. She has tried nothing for the symptoms.        The following portions of the patient's history were reviewed and updated as appropriate: allergies, current medications and problem list.    Review of Systems   Constitutional: Negative for fatigue and fever.   Respiratory: Negative for cough and shortness of breath.    Cardiovascular: Negative for chest pain, palpitations and leg swelling.   Gastrointestinal: Negative for abdominal pain, nausea and vomiting.   Neurological: Positive for light-headedness and headaches. Negative for dizziness, weakness and numbness.       Objective   Physical Exam   Constitutional: She is oriented to person, place, and time. She appears well-developed and well-nourished.   HENT:   Head: Normocephalic.   Nose: Nose normal.   Neck: Carotid bruit is not present. No thyroid mass and no thyromegaly present.   Cardiovascular: Regular rhythm and normal heart sounds.  Exam reveals no S3 and no S4.    No murmur heard.  Pulses:       Dorsalis pedis pulses are 2+ on the right side, and 2+ on the left side.        Posterior tibial pulses are 2+ on the right side, and 2+ on the left side.   Repeat bp left arm 142/92  Repeat bp right arm  138/88  No edema    Pulmonary/Chest: Effort normal and breath sounds normal. She has  no decreased breath sounds. She has no wheezes. She has no rhonchi. She has no rales.   Musculoskeletal: She exhibits no edema.   Neurological: She is alert and oriented to person, place, and time. No cranial nerve deficit or sensory deficit. She displays a negative Romberg sign. Coordination and gait normal.   Skin: Skin is warm and dry.   Psychiatric: She has a normal mood and affect.       Assessment/Plan   Jana was seen today for dizziness.    Diagnoses and all orders for this visit:    Lightheadedness  Comments:  intermittent; she is due to see ENT and have glucose intolerance test tomorrow     Essential hypertension  Comments:  goal of bp less than 140/90    Health care maintenance  -     Hepatitis C Antibody    Hypercholesterolemia  -     CK  -     Comprehensive Metabolic Panel  -     Lipid Panel

## 2017-04-01 ENCOUNTER — APPOINTMENT (OUTPATIENT)
Dept: LAB | Facility: HOSPITAL | Age: 67
End: 2017-04-01
Attending: OTOLARYNGOLOGY

## 2017-04-01 ENCOUNTER — TRANSCRIBE ORDERS (OUTPATIENT)
Dept: ADMINISTRATIVE | Facility: HOSPITAL | Age: 67
End: 2017-04-01

## 2017-04-01 DIAGNOSIS — R73.9 BLOOD GLUCOSE ELEVATED: Primary | ICD-10-CM

## 2017-04-01 DIAGNOSIS — R26.81 UNSTEADY: ICD-10-CM

## 2017-04-01 LAB
GLUCOSE 1H P 100 G GLC PO SERPL-MCNC: 199 MG/DL
GLUCOSE 2H P 100 G GLC PO SERPL-MCNC: 156 MG/DL
GLUCOSE 3H P 100 G GLC PO SERPL-MCNC: 92 MG/DL
GLUCOSE P FAST SERPL-MCNC: 117 MG/DL
HCV AB S/CO SERPL IA: <0.1 S/CO RATIO (ref 0–0.9)

## 2017-04-01 PROCEDURE — 36415 COLL VENOUS BLD VENIPUNCTURE: CPT | Performed by: OTOLARYNGOLOGY

## 2017-04-01 PROCEDURE — 82952 GTT-ADDED SAMPLES: CPT | Performed by: OTOLARYNGOLOGY

## 2017-04-01 PROCEDURE — 82951 GLUCOSE TOLERANCE TEST (GTT): CPT | Performed by: OTOLARYNGOLOGY

## 2017-04-04 ENCOUNTER — TELEPHONE (OUTPATIENT)
Dept: INTERNAL MEDICINE | Facility: CLINIC | Age: 67
End: 2017-04-04

## 2017-04-04 NOTE — TELEPHONE ENCOUNTER
"----- Message from Janiya Moreau MA sent at 4/4/2017  9:16 AM EDT -----  Pt is leaving town but will come to appt next week.  Results of Glucose Testing has her questioning \"prediabetic Status\"  Will incorporate a low sugar low fat diet and monitor carbohydrates from breads and pasta.  Will discuss at appt but says to call if any other advice is necessary.    "

## 2017-04-04 NOTE — TELEPHONE ENCOUNTER
Left mess on vm that Dr Newberry has stated results from test and as well her keeping us up to date on glucoses will actually need to go to  that has ordered test

## 2017-04-07 ENCOUNTER — TELEPHONE (OUTPATIENT)
Dept: INTERNAL MEDICINE | Facility: CLINIC | Age: 67
End: 2017-04-07

## 2017-04-07 NOTE — TELEPHONE ENCOUNTER
Patient asking what medication to stop   Lexapro, Chlorthalidone or propranolol, she has been taking lisinopril for awhile not concerned over that one and is not taking alprazolam

## 2017-04-07 NOTE — TELEPHONE ENCOUNTER
"----- Message from Janiya Moreau MA sent at 4/7/2017  8:07 AM EDT -----  Pt calling from out of town and experiencing lightheadedness and dizziness since taking blood pressure meds-wants to know if she can stop meds. Says she has never \"felt so bad\"    Pls call today asap      Pt [phone]  "

## 2017-04-12 ENCOUNTER — OFFICE VISIT (OUTPATIENT)
Dept: INTERNAL MEDICINE | Facility: CLINIC | Age: 67
End: 2017-04-12

## 2017-04-12 VITALS
BODY MASS INDEX: 25.55 KG/M2 | WEIGHT: 159 LBS | HEIGHT: 66 IN | DIASTOLIC BLOOD PRESSURE: 64 MMHG | SYSTOLIC BLOOD PRESSURE: 124 MMHG

## 2017-04-12 DIAGNOSIS — R73.01 IMPAIRED FASTING GLUCOSE: ICD-10-CM

## 2017-04-12 DIAGNOSIS — F41.9 ANXIETY: ICD-10-CM

## 2017-04-12 DIAGNOSIS — R42 LIGHTHEADEDNESS: ICD-10-CM

## 2017-04-12 DIAGNOSIS — I10 BENIGN ESSENTIAL HYPERTENSION: Primary | ICD-10-CM

## 2017-04-12 DIAGNOSIS — E78.00 HYPERCHOLESTEROLEMIA: ICD-10-CM

## 2017-04-12 PROCEDURE — 99214 OFFICE O/P EST MOD 30 MIN: CPT | Performed by: INTERNAL MEDICINE

## 2017-04-12 RX ORDER — DULOXETIN HYDROCHLORIDE 60 MG/1
60 CAPSULE, DELAYED RELEASE ORAL DAILY
Qty: 30 CAPSULE | Refills: 11 | Status: SHIPPED | OUTPATIENT
Start: 2017-04-12 | End: 2018-03-28 | Stop reason: SDUPTHER

## 2017-04-12 NOTE — PROGRESS NOTES
"Shelby Page is a 66 y.o. female. Here for hyperlipidemia, anxiety, HTN and lightheadedness f/u.    History of Present Illness     /64  Ht 66\" (167.6 cm)  Wt 159 lb (72.1 kg)  BMI 25.66 kg/m2    Current Outpatient Prescriptions:   •  aspirin 81 MG EC tablet, Take 81 mg by mouth Daily., Disp: , Rfl:   •  atorvastatin (LIPITOR) 20 MG tablet, Take 1 tablet by mouth Daily., Disp: 30 tablet, Rfl: 11  •  Cholecalciferol (VITAMIN D) 1000 UNITS tablet, Take  by mouth., Disp: , Rfl:   •  Coenzyme Q10 (CO Q 10) 10 MG capsule, Take  by mouth., Disp: , Rfl:   •  escitalopram (LEXAPRO) 10 MG tablet, Take 1 tablet by mouth Daily., Disp: 30 tablet, Rfl: 11  •  fluticasone (FLONASE) 50 MCG/ACT nasal spray, 2 sprays into each nostril daily. Administer 2 sprays in each nostril for each dose., Disp: , Rfl:   •  lisinopril (PRINIVIL,ZESTRIL) 40 MG tablet, Take 1 tablet by mouth Daily., Disp: 90 tablet, Rfl: 3  •  Misc Natural Products (OSTEO BI-FLEX JOINT SHIELD) tablet, Take  by mouth., Disp: , Rfl:   •  Multiple Vitamin (MULTIVITAMIN) capsule, Take  by mouth., Disp: , Rfl:   •  potassium chloride (K-DUR,KLOR-CON) 20 MEQ CR tablet, Take 1 tablet by mouth Daily., Disp: 30 tablet, Rfl: 1  •  Probiotic Product (PRO-BIOTIC BLEND) capsule, Take  by mouth., Disp: , Rfl:   •  propranolol (INDERAL) 20 MG tablet, Take 1 tablet by mouth 2 (Two) Times a Day., Disp: 60 tablet, Rfl: 11  •  ALPRAZolam (XANAX) 0.25 MG tablet, Take 1 tablet by mouth 2 (Two) Times a Day As Needed for Anxiety., Disp: 40 tablet, Rfl: 0  •  Calcium Carb-Cholecalciferol 600-200 MG-UNIT tablet, Take  by mouth., Disp: , Rfl:   •  chlorthalidone (HYGROTON) 25 MG tablet, Take 1 tablet by mouth Daily., Disp: 30 tablet, Rfl: 11  •  psyllium (METAMUCIL) 58.6 % packet, Take 1 packet by mouth Daily., Disp: , Rfl:     Patient has long-standing benign essential HTN.  BP is usually well controlled with daily use of b-blocker and ACEI. On low salt diet with " "good compliance. Takes medication regularly. Denies chest pain, dyspnea,lightheadedness,  lower extremity edema. Patient checks blood pressure at home regularly. Reports that all numbers are in the normal range. AS she continues struggle with lightheadedness, she had discontinued Chlorthalidone a week ago w/o improvement.Her BP is much better with better anxiety control.    Patient c/o continuous lightheadedness, worse as she wakes up in early am hours. She feels lightheaded and occasionally in the afternoons has headaches. She states that her lightheadedness and tingling in her arms and legs are worse in am, as she wakes up, and gets better as the day progresses. Patient states that she has occasional nausea, loss of appetite, but no vomiting. There had been no weight loss. She has no vertigo. S-ms had been going for the last 2 months. She feels tired and weak in the afternoons. No exercising. One son is about to be engaged and another is moving out of her house. Patient had nl head CT, stress test, ENT evaluation and glucose tolerance test.     Patient has been diagnosed with hyperlipidemia. Target LDL is < 70 mg/dl (\"very high\" risk for CHD). Patient is on low fat diet with good compliance. Patient is compliant with treatment: daily use of Lipitor (atorvastatin). Medication had been new for the last 2 months - we had changed from Simvastatin 2 months ago due to inadequate control. Side effects of medication: none. Exercise none.    The following portions of the patient's history were reviewed and updated as appropriate: allergies, current medications, past family history, past medical history, past social history, past surgical history and problem list.    Review of Systems   Constitutional: Negative for chills and fever.   HENT: Negative for postnasal drip, sinus pressure and sore throat.    Eyes: Negative for pain and itching.   Respiratory: Negative for cough and chest tightness.    Cardiovascular: Negative " for chest pain and leg swelling.   Gastrointestinal: Negative for abdominal pain and blood in stool.   Endocrine: Negative for cold intolerance and heat intolerance.   Genitourinary: Negative for difficulty urinating and flank pain.   Musculoskeletal: Negative for back pain and neck pain.   Skin: Negative for color change and rash.   Neurological: Positive for dizziness and numbness (tingling in arms and legs). Negative for weakness and headaches.   Hematological: Negative for adenopathy. Does not bruise/bleed easily.   Psychiatric/Behavioral: Negative for sleep disturbance. The patient is not nervous/anxious.        Objective   Physical Exam   Constitutional: She is oriented to person, place, and time. She appears well-developed and well-nourished.   HENT:   Head: Normocephalic and atraumatic.   Right Ear: Tympanic membrane, external ear and ear canal normal.   Left Ear: Tympanic membrane, external ear and ear canal normal.   Nose: Nose normal. Right sinus exhibits no maxillary sinus tenderness and no frontal sinus tenderness. Left sinus exhibits no maxillary sinus tenderness and no frontal sinus tenderness.   Mouth/Throat: Uvula is midline, oropharynx is clear and moist and mucous membranes are normal.   Eyes: Conjunctivae and EOM are normal. Pupils are equal, round, and reactive to light. Right eye exhibits no discharge. Left eye exhibits no discharge. No scleral icterus.   Neck: Neck supple. No JVD present.   Cardiovascular: Normal rate, regular rhythm and normal heart sounds.  Exam reveals no gallop and no friction rub.    No murmur heard.  Pulmonary/Chest: Effort normal and breath sounds normal. She has no wheezes. She has no rales.   Musculoskeletal: She exhibits no edema.   Lymphadenopathy:     She has no cervical adenopathy.   Neurological: She is alert and oriented to person, place, and time. No cranial nerve deficit.   Skin: Skin is warm and dry. No rash noted.   Psychiatric: She has a normal mood and  "affect. Her behavior is normal.   Vitals reviewed.      Assessment/Plan   Diagnoses and all orders for this visit:    Benign essential hypertension    Lightheadedness    Anxiety    Hypercholesterolemia    HTN - well controlled  with current medication as she had discontinued Chlorthalidone. Will stop potassium supplements. Reminded of the importance of low salt diet. Normal kidney tests and electrolytes. Continue same treatment.  Hyperlipidemia - well controlled with after the change in  medication and with low fat diet. Target LDL is  < 70 mg/dl (\"very high\" risk for CHD). Patient's 58. Normal liver function tests. Continue same medication and diet.Regular exercise recommended.   Anxiety - poor control with taking 4 weeks of Lexapro, will try to change to Citaloparam - take on alternate days for 10-14 days, then switch completely to Cymbalta. May have some nausea for the first 2 weeks. Restart exercise.  Lightheadedness - negative extensive w/u. Will try to change SSRI from Escitalopram to Cymbalta.         "

## 2017-04-12 NOTE — PATIENT INSTRUCTIONS
"HTN - well controlled  with current medication as she had discontinued Chlorthalidone. Will stop potassium supplements. Reminded of the importance of low salt diet. Normal kidney tests and electrolytes. Continue same treatment.  Hyperlipidemia - well controlled with after the change in  medication and with low fat diet. Target LDL is  < 70 mg/dl (\"very high\" risk for CHD). Patient's 58. Normal liver function tests. Continue same medication and diet.Regular exercise recommended.   Anxiety - poor control with taking 4 weeks of Lexapro, will try to change to Citaloparam - take on alternate days for 10-14 days, then switch completely to Cymbalta. May have some nausea for the first 2 weeks. Restart exercise.  Lightheadedness - negative extensive w/u. Will try to change SSRI from Escitalopram to Cymbalta.        "

## 2017-04-13 ENCOUNTER — TELEPHONE (OUTPATIENT)
Dept: INTERNAL MEDICINE | Facility: CLINIC | Age: 67
End: 2017-04-13

## 2017-04-13 NOTE — TELEPHONE ENCOUNTER
----- Message from Janiya Moreau MA sent at 4/13/2017 10:41 AM EDT -----  Pt calling regarding new prescription of Duloxetine/Cymbalta-she is confused as to weaning off previous Lexapro and the side effects of withdrawing.    pls advise    Pt#[phone]

## 2017-04-20 ENCOUNTER — TELEPHONE (OUTPATIENT)
Dept: INTERNAL MEDICINE | Facility: CLINIC | Age: 67
End: 2017-04-20

## 2017-04-20 NOTE — TELEPHONE ENCOUNTER
----- Message from Annalise Pretty sent at 4/20/2017  1:09 PM EDT -----  Contact: Patient  Patient called again wanting to know if glucose tolerance test levels were related to her headache, dizziness, and lightheadedness.  Please advise.      Patient:  [phone]

## 2017-04-20 NOTE — TELEPHONE ENCOUNTER
----- Message from Janiya Moreau MA sent at 4/20/2017  8:57 AM EDT -----  Pt says that glucose tolerance testing from ENT are abnormal and he feels that it is contributing to pt's dizziness and h/a.  Pt has d/c Lexapro and started Cymbalta    Pt#[phone] or [phone]

## 2017-04-20 NOTE — TELEPHONE ENCOUNTER
Left mess on vm that yes it is what the ENT doctor has told her and it is also why doctor Rohith suggested to stay on low carb diet, that the glucose tolerance can cause the headaches and dizziness

## 2017-06-06 ENCOUNTER — OFFICE VISIT (OUTPATIENT)
Dept: INTERNAL MEDICINE | Facility: CLINIC | Age: 67
End: 2017-06-06

## 2017-06-06 VITALS
BODY MASS INDEX: 24.43 KG/M2 | DIASTOLIC BLOOD PRESSURE: 92 MMHG | HEIGHT: 66 IN | SYSTOLIC BLOOD PRESSURE: 142 MMHG | WEIGHT: 152 LBS

## 2017-06-06 DIAGNOSIS — R42 LIGHTHEADEDNESS: ICD-10-CM

## 2017-06-06 DIAGNOSIS — R73.01 IMPAIRED FASTING BLOOD SUGAR: ICD-10-CM

## 2017-06-06 DIAGNOSIS — I10 BENIGN ESSENTIAL HYPERTENSION: Primary | ICD-10-CM

## 2017-06-06 LAB
ANION GAP SERPL CALCULATED.3IONS-SCNC: 13 MMOL/L
BUN BLD-MCNC: 17 MG/DL (ref 6–22)
BUN/CREAT SERPL: 24.6 (ref 7–25)
CALCIUM SPEC-SCNC: 8.9 MG/DL (ref 8.6–10.5)
CHLORIDE SERPL-SCNC: 105 MMOL/L (ref 95–107)
CO2 SERPL-SCNC: 24 MMOL/L (ref 23–32)
CREAT BLD-MCNC: 0.69 MG/DL (ref 0.55–1.02)
GFR SERPL CREATININE-BSD FRML MDRD: 85 ML/MIN/1.73
GLUCOSE BLD-MCNC: 107 MG/DL (ref 70–100)
HBA1C MFR BLD: 5.6 % (ref 4–6)
POTASSIUM BLD-SCNC: 4 MMOL/L (ref 3.3–5.3)
SODIUM BLD-SCNC: 142 MMOL/L (ref 136–145)

## 2017-06-06 PROCEDURE — 99214 OFFICE O/P EST MOD 30 MIN: CPT | Performed by: INTERNAL MEDICINE

## 2017-06-06 PROCEDURE — 80048 BASIC METABOLIC PNL TOTAL CA: CPT | Performed by: INTERNAL MEDICINE

## 2017-06-06 PROCEDURE — 36415 COLL VENOUS BLD VENIPUNCTURE: CPT | Performed by: INTERNAL MEDICINE

## 2017-06-06 PROCEDURE — 83036 HEMOGLOBIN GLYCOSYLATED A1C: CPT | Performed by: INTERNAL MEDICINE

## 2017-06-06 RX ORDER — PROPRANOLOL HYDROCHLORIDE 40 MG/1
40 TABLET ORAL 2 TIMES DAILY
Qty: 60 TABLET | Refills: 6 | Status: SHIPPED | OUTPATIENT
Start: 2017-06-06 | End: 2018-01-14 | Stop reason: SDUPTHER

## 2017-06-06 NOTE — PROGRESS NOTES
Please call patient: her blood sugar had much improved and is at 107 this time,. Long-term blod sugars control n umber Hb A1c is NORMAL

## 2017-06-06 NOTE — PATIENT INSTRUCTIONS
1. HTN - suboptimal control. Needs better salt restriction, also will increase the dose of Propranolol to 40 mg twice a day. Needs to continue low salt diet.  2. Lightheadedness - much better on Duloxetine, continue same.  3. Impaired fasting blood sugar -  On low salt diet. Will check fasting blood sugar and Hb A1C. Weight loss is very encouraging. Continue low carb, low starch diet.

## 2017-06-06 NOTE — PROGRESS NOTES
"Shelby Page is a 66 y.o. female. Here for HTN and lightheadedness f/u    History of Present Illness     /82  Ht 66\" (167.6 cm)  Wt 152 lb (68.9 kg)  BMI 24.53 kg/m2    Current Outpatient Prescriptions:   •  ALPRAZolam (XANAX) 0.25 MG tablet, Take 1 tablet by mouth 2 (Two) Times a Day As Needed for Anxiety., Disp: 40 tablet, Rfl: 0  •  aspirin 81 MG EC tablet, Take 81 mg by mouth Daily., Disp: , Rfl:   •  atorvastatin (LIPITOR) 20 MG tablet, Take 1 tablet by mouth Daily., Disp: 30 tablet, Rfl: 11  •  Calcium Carb-Cholecalciferol 600-200 MG-UNIT tablet, Take  by mouth., Disp: , Rfl:   •  Cholecalciferol (VITAMIN D) 1000 UNITS tablet, Take  by mouth., Disp: , Rfl:   •  Coenzyme Q10 (CO Q 10) 10 MG capsule, Take  by mouth., Disp: , Rfl:   •  DULoxetine (CYMBALTA) 60 MG capsule, Take 1 capsule by mouth Daily., Disp: 30 capsule, Rfl: 11  •  fluticasone (FLONASE) 50 MCG/ACT nasal spray, 2 sprays into each nostril daily. Administer 2 sprays in each nostril for each dose., Disp: , Rfl:   •  lisinopril (PRINIVIL,ZESTRIL) 40 MG tablet, Take 1 tablet by mouth Daily., Disp: 90 tablet, Rfl: 3  •  Misc Natural Products (OSTEO BI-FLEX JOINT SHIELD) tablet, Take  by mouth., Disp: , Rfl:   •  Multiple Vitamin (MULTIVITAMIN) capsule, Take  by mouth., Disp: , Rfl:   •  Probiotic Product (PRO-BIOTIC BLEND) capsule, Take  by mouth., Disp: , Rfl:   •  propranolol (INDERAL) 20 MG tablet, Take 1 tablet by mouth 2 (Two) Times a Day., Disp: 60 tablet, Rfl: 11  •  psyllium (METAMUCIL) 58.6 % packet, Take 1 packet by mouth Daily., Disp: , Rfl:     Patient has long-standing benign essential HTN.  BP is usually well controlled with daily use of Ca channel blocker and b-blocker. On low salt diet with good compliance. Takes medication regularly. Denies chest pain, dyspnea,lightheadedness,  lower extremity edema. Patient does not check blood pressure     Jana Page 66 y.o. female presents today for lightheadedness " f/u. Last was seen on 04/12/2017 and on that visit medication was changed due to persistent lightheadedness, that was felt to be due to anxiety.We had started Duloxetine.  Patient is compliant with treatment and denies  side effects. Patient reports no change in symptoms with medication change. The lightheadedness had been tremendeously better, but still  Has it at times.    Patient hd been noticed to have mildly elevated fasting blood sugars. Wonders about use of supplements. Yoga few times a week, walks. Had lost 6 lbs in the last 6 weeks with low carb diet.    The following portions of the patient's history were reviewed and updated as appropriate: allergies, current medications, past family history, past medical history, past social history, past surgical history and problem list.    Review of Systems   Constitutional: Negative for chills and fever.   Eyes: Negative for pain and redness.   Respiratory: Negative for cough and shortness of breath.    Cardiovascular: Negative for chest pain and leg swelling.   Neurological: Negative for dizziness and headaches.       Objective   Physical Exam   Constitutional: She is oriented to person, place, and time. She appears well-developed and well-nourished.   HENT:   Head: Normocephalic and atraumatic.   Right Ear: Tympanic membrane, external ear and ear canal normal.   Left Ear: Tympanic membrane, external ear and ear canal normal.   Nose: Nose normal. Right sinus exhibits no maxillary sinus tenderness and no frontal sinus tenderness. Left sinus exhibits no maxillary sinus tenderness and no frontal sinus tenderness.   Mouth/Throat: Uvula is midline, oropharynx is clear and moist and mucous membranes are normal.   Eyes: Conjunctivae and EOM are normal. Pupils are equal, round, and reactive to light. Right eye exhibits no discharge. Left eye exhibits no discharge. No scleral icterus.   Neck: Neck supple. No JVD present.   Cardiovascular: Normal rate, regular rhythm and  normal heart sounds.  Exam reveals no gallop and no friction rub.    No murmur heard.  Pulmonary/Chest: Effort normal and breath sounds normal. She has no wheezes. She has no rales.   Musculoskeletal: She exhibits no edema.   Lymphadenopathy:     She has no cervical adenopathy.   Neurological: She is alert and oriented to person, place, and time. No cranial nerve deficit.   Skin: Skin is warm and dry. No rash noted.   Psychiatric: She has a normal mood and affect. Her behavior is normal.   Vitals reviewed.      Assessment/Plan   Diagnoses and all orders for this visit:    Benign essential hypertension  -     Basic Metabolic Panel; Future    Lightheadedness    Impaired fasting blood sugar  -     Basic Metabolic Panel; Future  -     Hemoglobin A1c; Future    1. HTN - suboptimal control. Needs better salt restriction, also will increase the dose of Propranolol to 40 mg twice a day. Needs to continue low salt diet.  2. Lightheadedness - much better on Duloxetine, continue same.  3. Impaired fasting blood sugar -  On low salt diet. Will check fasting blood sugar and Hb A1C. Weight loss is very encouraging. Continue low carb, low starch diet.

## 2017-07-05 ENCOUNTER — OFFICE VISIT (OUTPATIENT)
Dept: INTERNAL MEDICINE | Facility: CLINIC | Age: 67
End: 2017-07-05

## 2017-07-05 VITALS
SYSTOLIC BLOOD PRESSURE: 150 MMHG | WEIGHT: 150 LBS | HEIGHT: 66 IN | BODY MASS INDEX: 24.11 KG/M2 | RESPIRATION RATE: 14 BRPM | DIASTOLIC BLOOD PRESSURE: 80 MMHG

## 2017-07-05 DIAGNOSIS — I10 BENIGN ESSENTIAL HYPERTENSION: Primary | ICD-10-CM

## 2017-07-05 PROCEDURE — 99213 OFFICE O/P EST LOW 20 MIN: CPT | Performed by: INTERNAL MEDICINE

## 2017-07-05 RX ORDER — OLMESARTAN MEDOXOMIL 40 MG/1
40 TABLET ORAL DAILY
Qty: 30 TABLET | Refills: 11 | Status: SHIPPED | OUTPATIENT
Start: 2017-07-05 | End: 2018-06-28 | Stop reason: SDUPTHER

## 2017-07-05 NOTE — PATIENT INSTRUCTIONS
HTN - suboptimal control. Will change Lisinopril to Benicar 40 mg a day, continue Propranolol 40 mg twice a day.

## 2017-07-05 NOTE — PROGRESS NOTES
Subjective   Jana Page is a 66 y.o. female. Here for HTN f/u    History of Present Illness   Jana Page 66 y.o. female presents today for benign essential hypertension f/u. Last was seen on 06/06/2017 and on that visit medication was changed due to inadequate control.We had increased the dose of Proparanolol to 40 mg.twice a day. Overall she had been feeling fine. Careful with limiting salt in the diet. Lost 9 lbs since 04/2017.  Patient is compliant with treatment and denies  side effects  .The following portions of the patient's history were reviewed and updated as appropriate: allergies, current medications, past family history, past medical history, past social history, past surgical history and problem list.    Review of Systems   Constitutional: Negative for chills and fever.   Eyes: Negative for pain and redness.   Respiratory: Negative for cough and shortness of breath.    Cardiovascular: Negative for chest pain and leg swelling.   Neurological: Negative for dizziness and headaches.       Objective   Physical Exam   Constitutional: She is oriented to person, place, and time. She appears well-developed and well-nourished.   HENT:   Head: Normocephalic and atraumatic.   Right Ear: Tympanic membrane, external ear and ear canal normal.   Left Ear: Tympanic membrane, external ear and ear canal normal.   Nose: Nose normal. Right sinus exhibits no maxillary sinus tenderness and no frontal sinus tenderness. Left sinus exhibits no maxillary sinus tenderness and no frontal sinus tenderness.   Mouth/Throat: Uvula is midline, oropharynx is clear and moist and mucous membranes are normal.   Eyes: Conjunctivae and EOM are normal. Pupils are equal, round, and reactive to light. Right eye exhibits no discharge. Left eye exhibits no discharge. No scleral icterus.   Neck: Neck supple. No JVD present.   Cardiovascular: Normal rate, regular rhythm and normal heart sounds.  Exam reveals no gallop and no friction  rub.    No murmur heard.  Pulmonary/Chest: Effort normal and breath sounds normal. She has no wheezes. She has no rales.   Musculoskeletal: She exhibits no edema.   Lymphadenopathy:     She has no cervical adenopathy.   Neurological: She is alert and oriented to person, place, and time. No cranial nerve deficit.   Skin: Skin is warm and dry. No rash noted.   Psychiatric: She has a normal mood and affect. Her behavior is normal.   Vitals reviewed.      Assessment/Plan   Jana was seen today for hypertension.    Diagnoses and all orders for this visit:    Benign essential hypertension  -     olmesartan (BENICAR) 40 MG tablet; Take 1 tablet by mouth Daily.    HTN - suboptimal control. Will change Lisinopril to Benicar 40 mg a day, continue Propranolol 40 mg twice a day.

## 2017-07-26 ENCOUNTER — LAB (OUTPATIENT)
Dept: INTERNAL MEDICINE | Facility: CLINIC | Age: 67
End: 2017-07-26

## 2017-07-26 DIAGNOSIS — I10 BENIGN ESSENTIAL HYPERTENSION: ICD-10-CM

## 2017-07-26 DIAGNOSIS — E78.00 HYPERCHOLESTEROLEMIA: ICD-10-CM

## 2017-07-26 DIAGNOSIS — R42 LIGHTHEADEDNESS: ICD-10-CM

## 2017-07-26 LAB
ALBUMIN SERPL-MCNC: 4.1 G/DL (ref 3.4–4.6)
ALBUMIN/GLOB SERPL: 1.6 G/DL
ALP SERPL-CCNC: 69 U/L (ref 46–116)
ALT SERPL W P-5'-P-CCNC: 33 U/L (ref 14–59)
ANION GAP SERPL CALCULATED.3IONS-SCNC: 12 MMOL/L
AST SERPL-CCNC: 22 U/L (ref 7–37)
BASOPHILS # BLD AUTO: 0.03 10*3/MM3 (ref 0–0.2)
BASOPHILS NFR BLD AUTO: 0.4 % (ref 0–1.5)
BILIRUB SERPL-MCNC: 0.6 MG/DL (ref 0.2–1)
BILIRUB UR QL STRIP: NEGATIVE
BUN BLD-MCNC: 16 MG/DL (ref 6–22)
BUN/CREAT SERPL: 24.6 (ref 7–25)
CALCIUM SPEC-SCNC: 9.3 MG/DL (ref 8.6–10.5)
CHLORIDE SERPL-SCNC: 103 MMOL/L (ref 95–107)
CHOLEST SERPL-MCNC: 135 MG/DL (ref 0–200)
CLARITY UR: CLEAR
CO2 SERPL-SCNC: 25 MMOL/L (ref 23–32)
COLOR UR: YELLOW
CREAT BLD-MCNC: 0.65 MG/DL (ref 0.55–1.02)
EOSINOPHIL # BLD AUTO: 0.22 10*3/MM3 (ref 0–0.7)
EOSINOPHIL # BLD AUTO: 3 % (ref 0.3–6.2)
ERYTHROCYTE [DISTWIDTH] IN BLOOD BY AUTOMATED COUNT: 13 % (ref 11.7–13)
GFR SERPL CREATININE-BSD FRML MDRD: 91 ML/MIN/1.73
GLOBULIN UR ELPH-MCNC: 2.5 GM/DL
GLUCOSE BLD-MCNC: 110 MG/DL (ref 70–100)
GLUCOSE UR STRIP-MCNC: NEGATIVE MG/DL
HCT VFR BLD AUTO: 42.9 % (ref 35.6–45.5)
HDLC SERPL-MCNC: 59 MG/DL (ref 40–81)
HGB BLD-MCNC: 14.5 G/DL (ref 11.9–15.5)
HGB UR QL STRIP.AUTO: NEGATIVE
IMM GRANULOCYTES # BLD: 0.02 10*3/MM3 (ref 0–0.03)
IMM GRANULOCYTES NFR BLD: 0.3 % (ref 0–0.5)
KETONES UR QL STRIP: NEGATIVE
LDLC SERPL CALC-MCNC: 63 MG/DL (ref 0–100)
LDLC/HDLC SERPL: 1.07 {RATIO}
LEUKOCYTE ESTERASE UR QL STRIP.AUTO: NEGATIVE
LYMPHOCYTES # BLD AUTO: 1.78 10*3/MM3 (ref 0.9–4.8)
LYMPHOCYTES NFR BLD AUTO: 24.1 % (ref 19.6–45.3)
MCH RBC QN AUTO: 30.6 PG (ref 26.9–32)
MCHC RBC AUTO-ENTMCNC: 33.8 G/DL (ref 32.4–36.3)
MCV RBC AUTO: 90.5 FL (ref 80.5–98.2)
MONOCYTES # BLD AUTO: 0.58 10*3/MM3 (ref 0.2–1.2)
MONOCYTES NFR BLD AUTO: 7.8 % (ref 5–12)
NEUTROPHILS # BLD AUTO: 4.76 10*3/MM3 (ref 1.9–8.1)
NEUTROPHILS NFR BLD AUTO: 64.4 % (ref 42.7–76)
NITRITE UR QL STRIP: NEGATIVE
PH UR STRIP.AUTO: 6 [PH] (ref 5–8)
PLATELET # BLD AUTO: 265 10*3/MM3 (ref 140–500)
POTASSIUM BLD-SCNC: 4.4 MMOL/L (ref 3.3–5.3)
PROT SERPL-MCNC: 6.6 G/DL (ref 6.3–8.4)
PROT UR QL STRIP: NEGATIVE
RBC # BLD AUTO: 4.74 10*6/MM3 (ref 3.9–5.2)
SODIUM BLD-SCNC: 140 MMOL/L (ref 136–145)
SP GR UR STRIP: 1.02 (ref 1–1.03)
TRIGL SERPL-MCNC: 64 MG/DL (ref 0–150)
TSH SERPL DL<=0.05 MIU/L-ACNC: 0.75 MIU/ML (ref 0.4–4.2)
UROBILINOGEN UR QL STRIP: NORMAL
VLDLC SERPL-MCNC: 12.8 MG/DL
WBC # BLD AUTO: 7.39 10*3/MM3 (ref 4.5–10.7)

## 2017-07-26 PROCEDURE — 84443 ASSAY THYROID STIM HORMONE: CPT | Performed by: INTERNAL MEDICINE

## 2017-07-26 PROCEDURE — 80053 COMPREHEN METABOLIC PANEL: CPT | Performed by: INTERNAL MEDICINE

## 2017-07-26 PROCEDURE — 80061 LIPID PANEL: CPT | Performed by: INTERNAL MEDICINE

## 2017-07-26 PROCEDURE — 81003 URINALYSIS AUTO W/O SCOPE: CPT | Performed by: INTERNAL MEDICINE

## 2017-08-09 ENCOUNTER — APPOINTMENT (OUTPATIENT)
Dept: WOMENS IMAGING | Facility: HOSPITAL | Age: 67
End: 2017-08-09

## 2017-08-09 ENCOUNTER — OFFICE VISIT (OUTPATIENT)
Dept: INTERNAL MEDICINE | Facility: CLINIC | Age: 67
End: 2017-08-09

## 2017-08-09 VITALS
SYSTOLIC BLOOD PRESSURE: 166 MMHG | BODY MASS INDEX: 24.91 KG/M2 | DIASTOLIC BLOOD PRESSURE: 100 MMHG | WEIGHT: 155 LBS | HEIGHT: 66 IN

## 2017-08-09 DIAGNOSIS — I10 BENIGN ESSENTIAL HYPERTENSION: ICD-10-CM

## 2017-08-09 DIAGNOSIS — E78.00 HYPERCHOLESTEROLEMIA: ICD-10-CM

## 2017-08-09 DIAGNOSIS — Z00.00 HEALTH CARE MAINTENANCE: Primary | ICD-10-CM

## 2017-08-09 DIAGNOSIS — Z12.31 VISIT FOR SCREENING MAMMOGRAM: ICD-10-CM

## 2017-08-09 DIAGNOSIS — Z23 NEED FOR PNEUMOCOCCAL VACCINATION: ICD-10-CM

## 2017-08-09 DIAGNOSIS — R73.01 IMPAIRED FASTING BLOOD SUGAR: ICD-10-CM

## 2017-08-09 DIAGNOSIS — M79.641 HAND PAIN, RIGHT: ICD-10-CM

## 2017-08-09 PROBLEM — R42 LIGHTHEADEDNESS: Status: RESOLVED | Noted: 2017-02-10 | Resolved: 2017-08-09

## 2017-08-09 PROBLEM — E87.6 HYPOKALEMIA: Status: RESOLVED | Noted: 2017-02-17 | Resolved: 2017-08-09

## 2017-08-09 PROCEDURE — 99214 OFFICE O/P EST MOD 30 MIN: CPT | Performed by: INTERNAL MEDICINE

## 2017-08-09 PROCEDURE — 73110 X-RAY EXAM OF WRIST: CPT | Performed by: RADIOLOGY

## 2017-08-09 PROCEDURE — 73110 X-RAY EXAM OF WRIST: CPT | Performed by: INTERNAL MEDICINE

## 2017-08-09 PROCEDURE — 73130 X-RAY EXAM OF HAND: CPT | Performed by: INTERNAL MEDICINE

## 2017-08-09 PROCEDURE — 90732 PPSV23 VACC 2 YRS+ SUBQ/IM: CPT | Performed by: INTERNAL MEDICINE

## 2017-08-09 PROCEDURE — G0438 PPPS, INITIAL VISIT: HCPCS | Performed by: INTERNAL MEDICINE

## 2017-08-09 PROCEDURE — G0009 ADMIN PNEUMOCOCCAL VACCINE: HCPCS | Performed by: INTERNAL MEDICINE

## 2017-08-09 PROCEDURE — 73130 X-RAY EXAM OF HAND: CPT | Performed by: RADIOLOGY

## 2017-08-09 RX ORDER — AMLODIPINE BESYLATE 5 MG/1
5 TABLET ORAL DAILY
Qty: 30 TABLET | Refills: 11 | Status: SHIPPED | OUTPATIENT
Start: 2017-08-09 | End: 2018-07-31 | Stop reason: SDUPTHER

## 2017-08-09 RX ORDER — MELOXICAM 15 MG/1
15 TABLET ORAL DAILY
Qty: 30 TABLET | Refills: 11 | Status: SHIPPED | OUTPATIENT
Start: 2017-08-09 | End: 2018-03-12

## 2017-08-09 NOTE — PROGRESS NOTES
"Subjective   Jana Page is a 66 y.o. female. Patient is here for Welcome M/C visit, also HTN, hyperlipidemia and impaired fasting blood sugar f/u.    History of Present Illness   /90  Ht 66\" (167.6 cm)  Wt 155 lb (70.3 kg)  BMI 25.02 kg/m2  Patient is being seen for Welcome to Medicare wellness visit.  Hospitalizations in a past: only for childbirth  Once per lifetime Medicare screening tests: ECG - had stress test 03/2017    AAA risk assessment: 1. FH: none. 2.H/o tobacco smoking: in remote past, as per . 3. CV or PAD: none.    Tobacco use: in remote past, as per    Alcohol use: 4-5 days a week  Illicit drugs use: none  Diet: well balanced  Exercise: none    Anxiety screening: How many days in the last 2 weeks you were  1. Feeling anxious, nervous, on edge: few  2. Unable to stop worrying: none  3. Worrying too much about different things: none  4. Having problems relaxing:none  5. Feeling restless or unable to sit still:none  6. Feeling irritable or easely annoyed: few  7. Being afraid that something awful might happen: none    Depression screening PHQ9:  Over the past 2 weeks how often have you been bothered by  1. Lack of interest or pleasuer in usual activities: none  2. Feeling down, depressed, hopeless:none  3. Troubles falling asleep/sleeping too long:none  4. Feeling tired, having little energy: none  5. Poor appetite or overeating: none  6. Feeling bad about yourself:none.  7. Trouble concentrating: at the baseline.  8. Moving or speaking too slow or much faster than usual: none  9. Thoughts about harming yourself:none.    Cognitive impairment screening:   Do you have difficulties with:  1. Language: no  2. Behavior: no  3. Learning/retaining new information: \"its just hard sometimes to remember things\"  4. Handling complex tasks: no  5. Reasoning: no  6. Spacial ability and orientation: no    Hearing: normal.  Driving: unrestricted  ADL: independent  ADL details: Does patient needs " "help with:  telephone use: no  Transportation: no  Housework: no  Shopping: no  meal preparation: no  laundry: no  managing medication:no  Participate in the social activities: Y    Fall risk factors:   1.Use of alcohol: yes    2.Polypharmacy: no  3.H/o of previous fall:  yes  4. Mobility impairment:  no  5. Visual impairment:  no  6. Deconditioning: yes  7. Use of antihypertensive medication:  yes  8. Use of sedatives: no  9. Use of antidepressant: yes    Home safety:   1.loose rugs: no   2.unfamiliar environment: no   3. Uneven floors: no   4. No grab bars in the bathroom:  no  5. No banister on stairs: no      Advanced directives: has Living Will. Discussed. I agree with patient wishes and decision..    Co-managers: ophthalmology , dermatology Dr.Laura Delgado, surg - , ortho .      /90  Ht 66\" (167.6 cm)  Wt 155 lb (70.3 kg)  BMI 25.02 kg/m2    Current Outpatient Prescriptions:   •  aspirin 81 MG EC tablet, Take 81 mg by mouth Daily., Disp: , Rfl:   •  atorvastatin (LIPITOR) 20 MG tablet, Take 1 tablet by mouth Daily., Disp: 30 tablet, Rfl: 11  •  Calcium Carb-Cholecalciferol 600-200 MG-UNIT tablet, Take  by mouth., Disp: , Rfl:   •  Cholecalciferol (VITAMIN D) 1000 UNITS tablet, Take  by mouth., Disp: , Rfl:   •  Coenzyme Q10 (CO Q 10) 10 MG capsule, Take  by mouth., Disp: , Rfl:   •  DULoxetine (CYMBALTA) 60 MG capsule, Take 1 capsule by mouth Daily., Disp: 30 capsule, Rfl: 11  •  fluticasone (FLONASE) 50 MCG/ACT nasal spray, 2 sprays into each nostril daily. Administer 2 sprays in each nostril for each dose., Disp: , Rfl:   •  Misc Natural Products (OSTEO BI-FLEX JOINT SHIELD) tablet, Take  by mouth., Disp: , Rfl:   •  Multiple Vitamin (MULTIVITAMIN) capsule, Take  by mouth., Disp: , Rfl:   •  olmesartan (BENICAR) 40 MG tablet, Take 1 tablet by mouth Daily., Disp: 30 tablet, Rfl: 11  •  Probiotic Product (PRO-BIOTIC BLEND) capsule, Take  by mouth., Disp: , Rfl:   •  " "propranolol (INDERAL) 40 MG tablet, Take 1 tablet by mouth 2 (Two) Times a Day., Disp: 60 tablet, Rfl: 6  •  psyllium (METAMUCIL) 58.6 % packet, Take 1 packet by mouth Daily., Disp: , Rfl:     Patient has long-standing benign essential HTN.  BP is usually well controlled with daily use of b-blocker and ARB. On low salt diet with good compliance. Takes medication regularly. Denies chest pain, dyspnea,lightheadedness,  lower extremity edema. Patient does not check blood pressure    Patient has been diagnosed with hyperlipidemia. Target LDL is < 100 mg/dl (CHD or \"CHD risk equivalent\" is present). Patient is on low fat diet with good compliance. Patient is compliant with treatment: daily use of Lipitor (atorvastatin). Side effects of medication: none. Exercise none.    Patient has been noticed to have elevated blood sugars. Jana Page uses diet alone for blood sugars control. Patient does not check home blood sugars.. Compliance with low carb diabetic diet is fair. Eats quite a bit of starches . No exercise.     Patient had developed right hand (dominant) pains and some stiffness a month ago or so. Off and on, hurts a lot last night. Had a fall 2 weeks ago, that she had tried to break with the right hand, but had pain even before the fall. Patient had used OTC NSAIDs for pain control. It hurts to make a fist.                                The following portions of the patient's history were reviewed and updated as appropriate: allergies, current medications, past family history, past medical history, past social history, past surgical history and problem list.    Review of Systems   Constitutional: Negative for chills and fever.   HENT: Negative for postnasal drip, sinus pressure and sore throat.    Eyes: Negative for pain and itching.   Respiratory: Negative for cough and chest tightness.    Cardiovascular: Negative for chest pain and leg swelling.   Gastrointestinal: Negative for abdominal pain and blood in " stool.   Endocrine: Negative for cold intolerance and heat intolerance.   Genitourinary: Negative for difficulty urinating and flank pain.   Musculoskeletal: Negative for back pain and neck pain.   Skin: Negative for color change and rash.   Neurological: Positive for headaches. Negative for dizziness and weakness.   Hematological: Negative for adenopathy. Does not bruise/bleed easily.   Psychiatric/Behavioral: Positive for sleep disturbance. The patient is not nervous/anxious.        Objective   Physical Exam   Constitutional: She is oriented to person, place, and time. Vital signs are normal. She appears well-developed and well-nourished. No distress.   Central fat distribution   HENT:   Head: Normocephalic and atraumatic.   Right Ear: External ear normal.   Left Ear: External ear normal.   Nose: Nose normal. No mucosal edema. Right sinus exhibits no maxillary sinus tenderness and no frontal sinus tenderness. Left sinus exhibits no maxillary sinus tenderness and no frontal sinus tenderness.   Mouth/Throat: Oropharynx is clear and moist. No oropharyngeal exudate.   Eyes: Conjunctivae, EOM and lids are normal. Pupils are equal, round, and reactive to light. Right eye exhibits no discharge. Left eye exhibits no discharge. Right conjunctiva is not injected. Left conjunctiva is not injected. No scleral icterus. Right eye exhibits normal extraocular motion. Left eye exhibits normal extraocular motion.   Neck: Normal range of motion and full passive range of motion without pain. Neck supple. No JVD present. Carotid bruit is not present. No thyromegaly present.   Cardiovascular: Normal rate, regular rhythm, S1 normal, S2 normal, normal heart sounds and intact distal pulses.  PMI is not displaced.  Exam reveals no gallop and no friction rub.    No murmur heard.  Pulmonary/Chest: Effort normal and breath sounds normal. No accessory muscle usage. No respiratory distress. She has no decreased breath sounds. She has no  wheezes. She has no rhonchi. She has no rales.   Abdominal: Soft. Bowel sounds are normal. She exhibits no distension, no pulsatile liver, no fluid wave, no abdominal bruit, no ascites and no mass. There is no tenderness. There is no rebound and no guarding.   Musculoskeletal: She exhibits tenderness (on palpation of the right wrist and fingers). She exhibits no edema or deformity.   Lymphadenopathy:        Head (right side): No submental, no submandibular, no preauricular, no posterior auricular and no occipital adenopathy present.        Head (left side): No submental, no submandibular, no tonsillar, no preauricular, no posterior auricular and no occipital adenopathy present.     She has no cervical adenopathy.        Right cervical: No superficial cervical, no deep cervical and no posterior cervical adenopathy present.       Left cervical: No superficial cervical, no deep cervical and no posterior cervical adenopathy present.        Right: No supraclavicular adenopathy present.        Left: No supraclavicular adenopathy present.   Neurological: She is alert and oriented to person, place, and time. She has normal strength and normal reflexes. She displays normal reflexes. No cranial nerve deficit. She exhibits normal muscle tone. Coordination normal.   Reflex Scores:       Bicep reflexes are 2+ on the right side and 2+ on the left side.       Patellar reflexes are 2+ on the right side and 2+ on the left side.  Skin: Skin is warm and dry. No rash noted. She is not diaphoretic. No erythema.   Psychiatric: She has a normal mood and affect. Her speech is normal and behavior is normal. Thought content normal.   Vitals reviewed.      Assessment/Plan   Jana was seen today for initial medicare wellness, hypertension and hyperlipidemia.    Diagnoses and all orders for this visit:    Health care maintenance    Benign essential hypertension    Hypercholesterolemia    Impaired fasting blood sugar    Hand pain,  "right    Annual wellness visit with preventive exam as well as age and risk appropriate councelling completed.    Cardiovascular disease councelling: current. Recommended: Continue statin and ASA.Excellent cholesterol.Needs to exercise.  Diabetes screening and councelling: current. Recommended: Low carb diet recommended. Needs to avoid starches and sweets., Regular aerobic exercise recommended., Weight loss recommended.  Breast cancer screening: is due. Will schedule..  Osteoporosis screening: up to date. Recommended every 5 years. Next screening is due in 2018..  Glaucoma screening: up to date.   AAA screening: not needed..  Hep C screening (born between [birth year]17) completed..  Colorectal cancer screening: up to date. Recommended every 10 years. Next screening is due in 2021..    Immunizations:   1. Influenza vaccine  is up to date and recommended yearly.   2. Pneumococcal vaccines: Pneumovaccine will be administered today.   3. Zostavax: completed.      Advanced directives planning: has Living Will. Discussed. I agree with patient wishes and decision..    Medications reviewed and medication list updated.   Potential harmful drug-disease interactions in the elderly:none.  High risk medication in elderly: none  ASA use: continue daily ASA 81 mg.    HTN - not well contrlled with current medication regimen. Normal kidney tests and electrolytes. Recommended low salt diet. Continue Omelzartan, but take it in the morning, and add Amlodipine 5 mg at night. Continue Propranolol twice a day.  Hyperlipidemia - well controlled with statin. Normal liver function tests. LDL target is below < 100 mg/dl (CHD or \"CHD risk equivalent\" is present). Patient's 63. Continue same treatment. Regular exercise recommended.  Impaired fasting blood sugar - Low carb diet and regular exercise recommended. Weight loss will be very beneficial, needs to loose midline fat.  Right hand/wrist pain - most likely osteoarthritis, will check " x-ray.Advised to use Salopnas patches, IcyHot, Tiger balm and like on the hand. Stop OTC NSAIDs and start Meloxicam 15 mg once a day.

## 2017-08-09 NOTE — PATIENT INSTRUCTIONS
"Annual wellness visit with preventive exam as well as age and risk appropriate councelling completed.    Cardiovascular disease councelling: current. Recommended: Continue statin and ASA.Excellent cholesterol.Needs to exercise.  Diabetes screening and councelling: current. Recommended: Low carb diet recommended. Needs to avoid starches and sweets., Regular aerobic exercise recommended., Weight loss recommended.  Breast cancer screening: is due. Will schedule..  Osteoporosis screening: up to date. Recommended every 5 years. Next screening is due in 2018..  Glaucoma screening: up to date.   AAA screening: not needed..  Hep C screening (born between [birth year]) completed..  Colorectal cancer screening: up to date. Recommended every 10 years. Next screening is due in 2021..    Immunizations:   1. Influenza vaccine  is up to date and recommended yearly.   2. Pneumococcal vaccines: Pneumovaccine will be administered today.   3. Zostavax: completed.      Advanced directives planning: has Living Will. Discussed. I agree with patient wishes and decision..    Medications reviewed and medication list updated.   Potential harmful drug-disease interactions in the elderly:none.  High risk medication in elderly: none  ASA use: continue daily ASA 81 mg.    HTN - not well contrlled with current medication regimen. Normal kidney tests and electrolytes. Recommended low salt diet. Continue Omelzartan, but take it in the morning, and add Amlodipine 5 mg at night. Continue Propranolol twice a day.  Hyperlipidemia - well controlled with statin. Normal liver function tests. LDL target is below < 100 mg/dl (CHD or \"CHD risk equivalent\" is present). Patient's 63. Continue same treatment. Regular exercise recommended.  Impaired fasting blood sugar - Low carb diet and regular exercise recommended. Weight loss will be very beneficial, needs to loose midline fat.  Right hand/wrist pain - most likely osteoarthritis, will check x-ray.Advised to use " Salopnas patches, IcyHot, Tiger balm and like on the hand. Stop OTC NSAIDs and start Meloxicam 15 mg once a day.  Return in about 3 weeks (around 8/30/2017) for HTN, hand pain.

## 2017-08-10 NOTE — PROGRESS NOTES
Please call patient: her hand and wrist x-rays both are normal, so probably she has an inflammation in the soft tissues due to the trauma. Lets take Meloxicam as we had decided and use all over the counter remedies that I had suggested.

## 2017-08-28 ENCOUNTER — OFFICE VISIT (OUTPATIENT)
Dept: INTERNAL MEDICINE | Facility: CLINIC | Age: 67
End: 2017-08-28

## 2017-08-28 VITALS
DIASTOLIC BLOOD PRESSURE: 76 MMHG | BODY MASS INDEX: 24.59 KG/M2 | SYSTOLIC BLOOD PRESSURE: 130 MMHG | WEIGHT: 153 LBS | HEIGHT: 66 IN | RESPIRATION RATE: 14 BRPM

## 2017-08-28 DIAGNOSIS — M79.641 HAND PAIN, RIGHT: ICD-10-CM

## 2017-08-28 DIAGNOSIS — I10 BENIGN ESSENTIAL HYPERTENSION: Primary | ICD-10-CM

## 2017-08-28 PROCEDURE — 99213 OFFICE O/P EST LOW 20 MIN: CPT | Performed by: INTERNAL MEDICINE

## 2017-08-28 NOTE — PROGRESS NOTES
Subjective   Jana Page is a 66 y.o. female.     History of Present Illness   Jana Page 66 y.o. female presents today for benign essential hypertension f/u. Last was seen on 08/09/2017 and on that visit medication was changed due to inadequate control.We had started Amlodipine 5 mg.  Patient is compliant with treatment and denies  side effects. Does not check her BP at home.     Also she is here for hand pain - since the fall a month ago. The x-ray were absolutely normal. The pain is getting better.  The following portions of the patient's history were reviewed and updated as appropriate: allergies, current medications, past family history, past medical history, past social history, past surgical history and problem list.    Review of Systems   Constitutional: Negative for chills and fever.   Eyes: Negative for pain and redness.   Respiratory: Negative for cough and shortness of breath.    Cardiovascular: Negative for chest pain and leg swelling.   Neurological: Negative for dizziness and headaches.       Objective   Physical Exam   Constitutional: She is oriented to person, place, and time. She appears well-developed and well-nourished.   HENT:   Head: Normocephalic and atraumatic.   Right Ear: Tympanic membrane, external ear and ear canal normal.   Left Ear: Tympanic membrane, external ear and ear canal normal.   Nose: Nose normal. Right sinus exhibits no maxillary sinus tenderness and no frontal sinus tenderness. Left sinus exhibits no maxillary sinus tenderness and no frontal sinus tenderness.   Mouth/Throat: Uvula is midline, oropharynx is clear and moist and mucous membranes are normal.   Eyes: Conjunctivae and EOM are normal. Pupils are equal, round, and reactive to light. Right eye exhibits no discharge. Left eye exhibits no discharge. No scleral icterus.   Neck: Neck supple. No JVD present.   Cardiovascular: Normal rate, regular rhythm and normal heart sounds.  Exam reveals no gallop and no  friction rub.    No murmur heard.  Pulmonary/Chest: Effort normal and breath sounds normal. She has no wheezes. She has no rales.   Musculoskeletal: She exhibits no edema.   Lymphadenopathy:     She has no cervical adenopathy.   Neurological: She is alert and oriented to person, place, and time. No cranial nerve deficit.   Skin: Skin is warm and dry. No rash noted.   Psychiatric: She has a normal mood and affect. Her behavior is normal.   Vitals reviewed.      Assessment/Plan   Jana was seen today for hypertension and hand pain.    Diagnoses and all orders for this visit:    Benign essential hypertension    Hand pain, right    HTN - well controlled  with current medication. Reminded of the importance of low salt diet. Continue same treatment.    Right hand pain and tingling in the finger since the trauma - improving. X-rays were absolutely normal. Reassured.

## 2017-09-11 ENCOUNTER — APPOINTMENT (OUTPATIENT)
Dept: WOMENS IMAGING | Facility: HOSPITAL | Age: 67
End: 2017-09-11

## 2017-09-11 PROCEDURE — G0202 SCR MAMMO BI INCL CAD: HCPCS | Performed by: RADIOLOGY

## 2017-09-11 PROCEDURE — 77063 BREAST TOMOSYNTHESIS BI: CPT | Performed by: RADIOLOGY

## 2017-10-23 ENCOUNTER — OFFICE VISIT (OUTPATIENT)
Dept: INTERNAL MEDICINE | Facility: CLINIC | Age: 67
End: 2017-10-23

## 2017-10-23 VITALS
WEIGHT: 158 LBS | RESPIRATION RATE: 14 BRPM | HEIGHT: 66 IN | SYSTOLIC BLOOD PRESSURE: 124 MMHG | DIASTOLIC BLOOD PRESSURE: 70 MMHG | BODY MASS INDEX: 25.39 KG/M2

## 2017-10-23 DIAGNOSIS — I10 BENIGN ESSENTIAL HYPERTENSION: Primary | ICD-10-CM

## 2017-10-23 DIAGNOSIS — E78.00 HYPERCHOLESTEROLEMIA: ICD-10-CM

## 2017-10-23 DIAGNOSIS — Z23 NEED FOR VACCINATION: ICD-10-CM

## 2017-10-23 PROCEDURE — G0008 ADMIN INFLUENZA VIRUS VAC: HCPCS | Performed by: INTERNAL MEDICINE

## 2017-10-23 PROCEDURE — 99213 OFFICE O/P EST LOW 20 MIN: CPT | Performed by: INTERNAL MEDICINE

## 2017-10-23 PROCEDURE — 90662 IIV NO PRSV INCREASED AG IM: CPT | Performed by: INTERNAL MEDICINE

## 2017-10-23 NOTE — PROGRESS NOTES
Subjective   Jana Page is a 67 y.o. female.     History of Present Illness   Jana Page 67 y.o. female presents today for benign essential hypertension f/u. Last was seen on 08/09/2017 and on that visit medication was changed due to inadequate control.We had started Amlodipine 5 mg.  Patient is compliant with treatment and denies  side effects. Patient doies not check her BP at home due to anxiety.  The following portions of the patient's history were reviewed and updated as appropriate: allergies, current medications, past family history, past medical history, past social history, past surgical history and problem list.    Review of Systems   Constitutional: Negative for chills and fever.   Eyes: Negative for pain and redness.   Respiratory: Negative for cough and shortness of breath.    Cardiovascular: Positive for leg swelling (sometimes in the evening right ankle gets swollen ). Negative for chest pain.   Neurological: Negative for dizziness and headaches.       Objective   Physical Exam   Constitutional: She is oriented to person, place, and time. She appears well-developed and well-nourished.   HENT:   Head: Normocephalic and atraumatic.   Right Ear: Tympanic membrane, external ear and ear canal normal.   Left Ear: Tympanic membrane, external ear and ear canal normal.   Nose: Nose normal. Right sinus exhibits no maxillary sinus tenderness and no frontal sinus tenderness. Left sinus exhibits no maxillary sinus tenderness and no frontal sinus tenderness.   Mouth/Throat: Uvula is midline, oropharynx is clear and moist and mucous membranes are normal.   Eyes: Conjunctivae and EOM are normal. Pupils are equal, round, and reactive to light. Right eye exhibits no discharge. Left eye exhibits no discharge. No scleral icterus.   Neck: Neck supple. No JVD present.   Cardiovascular: Normal rate, regular rhythm and normal heart sounds.  Exam reveals no gallop and no friction rub.    No murmur  heard.  Pulmonary/Chest: Effort normal and breath sounds normal. She has no wheezes. She has no rales.   Musculoskeletal: She exhibits no edema.   Lymphadenopathy:     She has no cervical adenopathy.   Neurological: She is alert and oriented to person, place, and time. No cranial nerve deficit.   Skin: Skin is warm and dry. No rash noted.   Psychiatric: She has a normal mood and affect. Her behavior is normal.   Vitals reviewed.      Assessment/Plan   Jana was seen today for hypertension.    Diagnoses and all orders for this visit:    Need for vaccination  -     Flu Vaccine High Dose PF 65YR+ ([id number])    Benign essential hypertension    Other orders  -     SCANNED - MAMMO    HTN - well controlled after the change in medication. Continue same. Increase exercise.  VIt D - recommended to take 1000 IU a day.

## 2017-10-23 NOTE — PATIENT INSTRUCTIONS
HTN - well controlled after the change in medication. Continue same. Increase exercise.  VIt D - recommended to take 1000 IU a day.

## 2018-01-14 DIAGNOSIS — I10 BENIGN ESSENTIAL HYPERTENSION: ICD-10-CM

## 2018-01-15 RX ORDER — PROPRANOLOL HYDROCHLORIDE 40 MG/1
TABLET ORAL
Qty: 60 TABLET | Refills: 5 | Status: SHIPPED | OUTPATIENT
Start: 2018-01-15 | End: 2018-02-26 | Stop reason: SDUPTHER

## 2018-02-20 ENCOUNTER — LAB (OUTPATIENT)
Dept: INTERNAL MEDICINE | Facility: CLINIC | Age: 68
End: 2018-02-20

## 2018-02-20 DIAGNOSIS — E78.00 HYPERCHOLESTEROLEMIA: ICD-10-CM

## 2018-02-20 LAB
ALBUMIN SERPL-MCNC: 4.3 G/DL (ref 3.5–5.2)
ALBUMIN/GLOB SERPL: 1.7 G/DL
ALP SERPL-CCNC: 77 U/L (ref 39–117)
ALT SERPL W P-5'-P-CCNC: 23 U/L (ref 1–33)
ANION GAP SERPL CALCULATED.3IONS-SCNC: 13.2 MMOL/L
AST SERPL-CCNC: 19 U/L (ref 1–32)
BILIRUB SERPL-MCNC: 0.6 MG/DL (ref 0.1–1.2)
BUN BLD-MCNC: 18 MG/DL (ref 8–23)
BUN/CREAT SERPL: 22 (ref 7–25)
CALCIUM SPEC-SCNC: 9.4 MG/DL (ref 8.6–10.5)
CHLORIDE SERPL-SCNC: 103 MMOL/L (ref 98–107)
CHOLEST SERPL-MCNC: 161 MG/DL (ref 0–200)
CO2 SERPL-SCNC: 23.8 MMOL/L (ref 22–29)
CREAT BLD-MCNC: 0.82 MG/DL (ref 0.57–1)
GFR SERPL CREATININE-BSD FRML MDRD: 70 ML/MIN/1.73
GLOBULIN UR ELPH-MCNC: 2.5 GM/DL
GLUCOSE BLD-MCNC: 128 MG/DL (ref 65–99)
HDLC SERPL-MCNC: 67 MG/DL (ref 40–60)
LDLC SERPL CALC-MCNC: 79 MG/DL (ref 0–100)
LDLC/HDLC SERPL: 1.17 {RATIO}
POTASSIUM BLD-SCNC: 4 MMOL/L (ref 3.5–5.2)
PROT SERPL-MCNC: 6.8 G/DL (ref 6–8.5)
SODIUM BLD-SCNC: 140 MMOL/L (ref 136–145)
TRIGL SERPL-MCNC: 77 MG/DL (ref 0–150)
VLDLC SERPL-MCNC: 15.4 MG/DL (ref 5–40)

## 2018-02-20 PROCEDURE — 80061 LIPID PANEL: CPT | Performed by: INTERNAL MEDICINE

## 2018-02-20 PROCEDURE — 80053 COMPREHEN METABOLIC PANEL: CPT | Performed by: INTERNAL MEDICINE

## 2018-02-20 PROCEDURE — 36415 COLL VENOUS BLD VENIPUNCTURE: CPT | Performed by: INTERNAL MEDICINE

## 2018-02-26 ENCOUNTER — OFFICE VISIT (OUTPATIENT)
Dept: INTERNAL MEDICINE | Facility: CLINIC | Age: 68
End: 2018-02-26

## 2018-02-26 VITALS
BODY MASS INDEX: 26.31 KG/M2 | DIASTOLIC BLOOD PRESSURE: 84 MMHG | SYSTOLIC BLOOD PRESSURE: 136 MMHG | WEIGHT: 163 LBS | OXYGEN SATURATION: 98 % | HEART RATE: 63 BPM

## 2018-02-26 DIAGNOSIS — R73.01 IMPAIRED FASTING BLOOD SUGAR: ICD-10-CM

## 2018-02-26 DIAGNOSIS — I10 BENIGN ESSENTIAL HYPERTENSION: Primary | ICD-10-CM

## 2018-02-26 DIAGNOSIS — I10 BENIGN ESSENTIAL HYPERTENSION: ICD-10-CM

## 2018-02-26 DIAGNOSIS — E78.00 HYPERCHOLESTEROLEMIA: ICD-10-CM

## 2018-02-26 PROBLEM — M25.541 ARTHRALGIA OF METACARPOPHALANGEAL JOINT, RIGHT: Status: ACTIVE | Noted: 2017-08-09

## 2018-02-26 LAB — HBA1C MFR BLD: 5.79 % (ref 4.8–5.6)

## 2018-02-26 PROCEDURE — 99214 OFFICE O/P EST MOD 30 MIN: CPT | Performed by: INTERNAL MEDICINE

## 2018-02-26 RX ORDER — PROPRANOLOL HYDROCHLORIDE 40 MG/1
60 TABLET ORAL 2 TIMES DAILY
Qty: 60 TABLET | Refills: 5 | Status: SHIPPED | OUTPATIENT
Start: 2018-02-26 | End: 2018-02-26 | Stop reason: SDUPTHER

## 2018-02-26 RX ORDER — PROPRANOLOL HYDROCHLORIDE 40 MG/1
60 TABLET ORAL 2 TIMES DAILY
Qty: 60 TABLET | Refills: 5 | Status: CANCELLED | OUTPATIENT
Start: 2018-02-26

## 2018-02-26 RX ORDER — PROPRANOLOL HYDROCHLORIDE 60 MG/1
60 TABLET ORAL 2 TIMES DAILY
Qty: 60 TABLET | Refills: 11 | Status: SHIPPED | OUTPATIENT
Start: 2018-02-26 | End: 2019-03-07 | Stop reason: SDUPTHER

## 2018-02-26 NOTE — TELEPHONE ENCOUNTER
----- Message from Annalise Pretty sent at 2/26/2018 11:21 AM EST -----  Contact: Pharmacy  [facility] called needing clarifiction on directions. Please advise    propranolol (INDERAL) 40 MG tablet      Pharmacy: [facility] [address] AT [address]  - [phone]  - [phone] FX

## 2018-02-26 NOTE — PROGRESS NOTES
Subjective   Jana Page is a 67 y.o. female.     History of Present Illness     /84 (BP Location: Right arm, Patient Position: Sitting, Cuff Size: Adult)  Pulse 63  Wt 73.9 kg (163 lb)  SpO2 98%  BMI 26.31 kg/m2    Current Outpatient Prescriptions:   •  amLODIPine (NORVASC) 5 MG tablet, Take 1 tablet by mouth Daily., Disp: 30 tablet, Rfl: 11  •  aspirin 81 MG EC tablet, Take 81 mg by mouth Daily., Disp: , Rfl:   •  atorvastatin (LIPITOR) 20 MG tablet, Take 1 tablet by mouth Daily., Disp: 30 tablet, Rfl: 11  •  Calcium Carb-Cholecalciferol 600-200 MG-UNIT tablet, Take  by mouth., Disp: , Rfl:   •  Cholecalciferol (VITAMIN D) 1000 UNITS tablet, Take  by mouth., Disp: , Rfl:   •  Coenzyme Q10 (CO Q 10) 10 MG capsule, Take  by mouth Daily., Disp: , Rfl:   •  DULoxetine (CYMBALTA) 60 MG capsule, Take 1 capsule by mouth Daily., Disp: 30 capsule, Rfl: 11  •  fluticasone (FLONASE) 50 MCG/ACT nasal spray, 2 sprays into each nostril daily. Administer 2 sprays in each nostril for each dose., Disp: , Rfl:   •  meloxicam (MOBIC) 15 MG tablet, Take 1 tablet by mouth Daily., Disp: 30 tablet, Rfl: 11  •  Misc Natural Products (OSTEO BI-FLEX JOINT SHIELD) tablet, Take  by mouth., Disp: , Rfl:   •  Multiple Vitamin (MULTIVITAMIN) capsule, Take 1 capsule by mouth Daily., Disp: , Rfl:   •  olmesartan (BENICAR) 40 MG tablet, Take 1 tablet by mouth Daily., Disp: 30 tablet, Rfl: 11  •  Probiotic Product (PRO-BIOTIC BLEND) capsule, Take  by mouth Daily., Disp: , Rfl:   •  propranolol (INDERAL) 40 MG tablet, TAKE ONE TABLET BY MOUTH TWICE A DAY, Disp: 60 tablet, Rfl: 5  •  psyllium (METAMUCIL) 58.6 % packet, Take 1 packet by mouth Daily., Disp: , Rfl:     Patient has long-standing benign essential HTN.  BP is usually well controlled with daily use of Ca channel blocker and ARB. On low salt diet with good compliance. Takes medication regularly. Denies chest pain, dyspnea,lightheadedness,  lower extremity edema. Patient does  "not check blood pressure    Patient has been diagnosed with hyperlipidemia. Target LDL is < 70 mg/dl (\"very high\" risk for CHD). Patient is on low fat diet with good compliance. Patient is compliant with treatment: daily use of Lipitor (atorvastatin). Side effects of medication: none. Exercise yoga.    Patient had been n oticed to have el;evated fasting blood sugars on several occasions. Jana Page uses diet for blood sugars control. Patient does not check home blood sugars.. Compliance with low carb diabetic diet is fair.    Patient had developed some tingling in the right great toe 3-4 days ago. No h/o trauma. No pain. No change in the shoewear.     The following portions of the patient's history were reviewed and updated as appropriate: allergies, current medications, past family history, past medical history, past social history, past surgical history and problem list.    Review of Systems   HENT: Negative for congestion, ear pain and sinus pressure.    Respiratory: Negative for cough, shortness of breath and wheezing.    Cardiovascular: Negative for chest pain and leg swelling.   Neurological: Positive for light-headedness. Negative for dizziness, syncope and headaches.       Objective   Physical Exam   Constitutional: She is oriented to person, place, and time. She appears well-developed and well-nourished.   HENT:   Head: Normocephalic and atraumatic.   Right Ear: Tympanic membrane, external ear and ear canal normal.   Left Ear: Tympanic membrane, external ear and ear canal normal.   Nose: Nose normal. Right sinus exhibits no maxillary sinus tenderness and no frontal sinus tenderness. Left sinus exhibits no maxillary sinus tenderness and no frontal sinus tenderness.   Mouth/Throat: Uvula is midline, oropharynx is clear and moist and mucous membranes are normal.   Eyes: Conjunctivae and EOM are normal. Pupils are equal, round, and reactive to light. Right eye exhibits no discharge. Left eye exhibits no " "discharge. No scleral icterus.   Neck: Neck supple. No JVD present.   Cardiovascular: Normal rate, regular rhythm and normal heart sounds.  Exam reveals no gallop and no friction rub.    No murmur heard.  Pulmonary/Chest: Effort normal and breath sounds normal. She has no wheezes. She has no rales.   Musculoskeletal: She exhibits no edema.   Lymphadenopathy:     She has no cervical adenopathy.   Neurological: She is alert and oriented to person, place, and time. No cranial nerve deficit.   Skin: Skin is warm and dry. No rash noted.   Psychiatric: She has a normal mood and affect. Her behavior is normal.   Vitals reviewed.      Assessment/Plan   Jana was seen today for hypertension, hyperlipidemia and toe pain.    Diagnoses and all orders for this visit:    Benign essential hypertension  -     propranolol (INDERAL) 40 MG tablet; Take 1.5 tablets by mouth 2 (Two) Times a Day.    Hypercholesterolemia    Impaired fasting blood sugar  -     Hemoglobin A1c; Future         HTN - not well contrlled with current medication regimen. Normal kidney tests and electrolytes. Recommended low salt diet. Will increase the dose of Propranolol to 60 mg twice a day. For now patient is to take one and half pill twice a day. Will reevaluate shortly.  Hyperlipidemia - well controlled with statin. Normal liver function tests. LDL target is below < 70 mg/dl (\"very high\" risk for CHD). Patient's 79. Continue same treatment. Regular exercise recommended.  Impaired fasting blood sugar - blood sugar fasting is high at 128. Will check Hb A1c. Low carb diet and regular exercise recommended.   Great toe numbness - has bunyon, some osteoarthritis, reassured. Needs to wear roomy shoes.     "

## 2018-02-26 NOTE — TELEPHONE ENCOUNTER
Please call pharmacy: the dose had been changed to 60 mg (this is the strength of the tables 60 mg) twice a day.

## 2018-03-12 ENCOUNTER — OFFICE VISIT (OUTPATIENT)
Dept: INTERNAL MEDICINE | Facility: CLINIC | Age: 68
End: 2018-03-12

## 2018-03-12 VITALS
DIASTOLIC BLOOD PRESSURE: 76 MMHG | OXYGEN SATURATION: 95 % | HEART RATE: 56 BPM | WEIGHT: 163 LBS | RESPIRATION RATE: 14 BRPM | BODY MASS INDEX: 26.2 KG/M2 | HEIGHT: 66 IN | SYSTOLIC BLOOD PRESSURE: 118 MMHG

## 2018-03-12 DIAGNOSIS — E78.00 HYPERCHOLESTEROLEMIA: ICD-10-CM

## 2018-03-12 DIAGNOSIS — M25.541 ARTHRALGIA OF METACARPOPHALANGEAL JOINT, RIGHT: ICD-10-CM

## 2018-03-12 DIAGNOSIS — I10 BENIGN ESSENTIAL HYPERTENSION: Primary | ICD-10-CM

## 2018-03-12 PROCEDURE — 99213 OFFICE O/P EST LOW 20 MIN: CPT | Performed by: INTERNAL MEDICINE

## 2018-03-12 NOTE — PROGRESS NOTES
"Subjective   Jana Page is a 67 y.o. female.     History of Present Illness   Jana Page 67 y.o. female presents today for benign essential hypertension f/u. Last was seen on 02.26/2018 and on that visit medication was changed due to inadequate control.We had increased the dose of Propranolol to 60 mg twice a day.  Patient is compliant with treatment and denies  side effects. Patient states that she had been feeling much better after the medication change.     Patient c/o base of the right thumb pain and deformity, \"clicks in am\", better with movements, stiff in the morning. No improvement with Meloxicam, that she takes daily.    /76 (BP Location: Right arm, Patient Position: Sitting, Cuff Size: Adult)   Resp 14   Ht 167.6 cm (66\")   Wt 73.9 kg (163 lb)   BMI 26.31 kg/m²     Current Outpatient Prescriptions:   •  amLODIPine (NORVASC) 5 MG tablet, Take 1 tablet by mouth Daily., Disp: 30 tablet, Rfl: 11  •  aspirin 81 MG EC tablet, Take 81 mg by mouth Daily., Disp: , Rfl:   •  atorvastatin (LIPITOR) 20 MG tablet, Take 1 tablet by mouth Daily., Disp: 30 tablet, Rfl: 11  •  Calcium Carb-Cholecalciferol 600-200 MG-UNIT tablet, Take  by mouth., Disp: , Rfl:   •  Cholecalciferol (VITAMIN D) 1000 UNITS tablet, Take  by mouth., Disp: , Rfl:   •  Coenzyme Q10 (CO Q 10) 10 MG capsule, Take  by mouth Daily., Disp: , Rfl:   •  DULoxetine (CYMBALTA) 60 MG capsule, Take 1 capsule by mouth Daily., Disp: 30 capsule, Rfl: 11  •  fluticasone (FLONASE) 50 MCG/ACT nasal spray, 2 sprays into each nostril daily. Administer 2 sprays in each nostril for each dose., Disp: , Rfl:   •  meloxicam (MOBIC) 15 MG tablet, Take 1 tablet by mouth Daily., Disp: 30 tablet, Rfl: 11  •  Misc Natural Products (OSTEO BI-FLEX JOINT SHIELD) tablet, Take  by mouth., Disp: , Rfl:   •  Multiple Vitamin (MULTIVITAMIN) capsule, Take 1 capsule by mouth Daily., Disp: , Rfl:   •  olmesartan (BENICAR) 40 MG tablet, Take 1 tablet by mouth " Daily., Disp: 30 tablet, Rfl: 11  •  Probiotic Product (PRO-BIOTIC BLEND) capsule, Take  by mouth Daily., Disp: , Rfl:   •  propranolol (INDERAL) 60 MG tablet, Take 1 tablet by mouth 2 (Two) Times a Day., Disp: 60 tablet, Rfl: 11  •  psyllium (METAMUCIL) 58.6 % packet, Take 1 packet by mouth Daily., Disp: , Rfl:   The following portions of the patient's history were reviewed and updated as appropriate: allergies, current medications, past family history, past medical history, past social history, past surgical history and problem list.    Review of Systems   Constitutional: Negative for chills and fever.   Eyes: Negative for pain and redness.   Respiratory: Negative for cough and shortness of breath.    Cardiovascular: Negative for chest pain and leg swelling.   Neurological: Negative for dizziness and headaches.       Objective   Physical Exam   Constitutional: She is oriented to person, place, and time. She appears well-developed and well-nourished.   HENT:   Head: Normocephalic and atraumatic.   Right Ear: Tympanic membrane, external ear and ear canal normal.   Left Ear: Tympanic membrane, external ear and ear canal normal.   Nose: Nose normal. Right sinus exhibits no maxillary sinus tenderness and no frontal sinus tenderness. Left sinus exhibits no maxillary sinus tenderness and no frontal sinus tenderness.   Mouth/Throat: Uvula is midline, oropharynx is clear and moist and mucous membranes are normal.   Eyes: Conjunctivae and EOM are normal. Pupils are equal, round, and reactive to light. Right eye exhibits no discharge. Left eye exhibits no discharge. No scleral icterus.   Neck: Neck supple. No JVD present.   Cardiovascular: Normal rate, regular rhythm and normal heart sounds.  Exam reveals no gallop and no friction rub.    No murmur heard.  Pulmonary/Chest: Effort normal and breath sounds normal. She has no wheezes. She has no rales.   Musculoskeletal: She exhibits tenderness (1st MCP and PIP right   handosteoarthritic deformities bases of both thumbs) and deformity. She exhibits no edema.   Lymphadenopathy:     She has no cervical adenopathy.   Neurological: She is alert and oriented to person, place, and time. No cranial nerve deficit.   Skin: Skin is warm and dry. No rash noted.   Psychiatric: She has a normal mood and affect. Her behavior is normal.   Vitals reviewed.      Assessment/Plan   Jana was seen today for hypertension.    Diagnoses and all orders for this visit:    Benign essential hypertension    Arthralgia of metacarpophalangeal joint, right  -     diclofenac sodium (VOTAREN XR) 100 MG 24 hr tablet; Take 1 tablet by mouth Daily.    HTN - well controlled  with current medication. Reminded of the importance of low salt diet. Normal kidney tests and electrolytes. Continue same treatment.  Right hand thumb pain - most likely osteoarthritis. I had suggested referral to hand specialist for the injection, but patient declines at that time. No help with Meloxicam - will try Voltaren once a day. Hands parafine bath recommended. Try over the counter Salonpas patches.

## 2018-03-12 NOTE — PATIENT INSTRUCTIONS
HTN - well controlled  with current medication. Reminded of the importance of low salt diet. Normal kidney tests and electrolytes. Continue same treatment.  Right hand thumb pain - most likely osteoarthritis. I had suggested referral to hand specialist for the injection, but patient declines at that time. No help with Meloxicam - will try Voltaren once a day. Hands parafine bath recommended. Try over the counter Salonpas patches.

## 2018-03-28 DIAGNOSIS — F41.9 ANXIETY: ICD-10-CM

## 2018-03-28 DIAGNOSIS — R42 LIGHTHEADEDNESS: ICD-10-CM

## 2018-03-28 RX ORDER — DULOXETIN HYDROCHLORIDE 60 MG/1
CAPSULE, DELAYED RELEASE ORAL
Qty: 90 CAPSULE | Refills: 1 | Status: SHIPPED | OUTPATIENT
Start: 2018-03-28 | End: 2018-10-03 | Stop reason: SDUPTHER

## 2018-05-10 DIAGNOSIS — E78.00 HYPERCHOLESTEROLEMIA: ICD-10-CM

## 2018-05-11 RX ORDER — ATORVASTATIN CALCIUM 20 MG/1
TABLET, FILM COATED ORAL
Qty: 90 TABLET | Refills: 1 | Status: SHIPPED | OUTPATIENT
Start: 2018-05-11 | End: 2018-10-03 | Stop reason: SDUPTHER

## 2018-06-28 DIAGNOSIS — I10 BENIGN ESSENTIAL HYPERTENSION: ICD-10-CM

## 2018-06-28 RX ORDER — OLMESARTAN MEDOXOMIL 40 MG/1
TABLET ORAL
Qty: 30 TABLET | Refills: 10 | Status: SHIPPED | OUTPATIENT
Start: 2018-06-28 | End: 2019-05-23 | Stop reason: SDUPTHER

## 2018-07-31 DIAGNOSIS — I10 BENIGN ESSENTIAL HYPERTENSION: ICD-10-CM

## 2018-07-31 RX ORDER — AMLODIPINE BESYLATE 5 MG/1
5 TABLET ORAL DAILY
Qty: 90 TABLET | Refills: 3 | Status: SHIPPED | OUTPATIENT
Start: 2018-07-31 | End: 2019-02-22 | Stop reason: SDUPTHER

## 2018-08-10 ENCOUNTER — LAB (OUTPATIENT)
Dept: INTERNAL MEDICINE | Facility: CLINIC | Age: 68
End: 2018-08-10

## 2018-08-10 DIAGNOSIS — E78.00 HYPERCHOLESTEROLEMIA: ICD-10-CM

## 2018-08-10 DIAGNOSIS — I10 BENIGN ESSENTIAL HYPERTENSION: ICD-10-CM

## 2018-08-10 LAB
ALBUMIN SERPL-MCNC: 4.5 G/DL (ref 3.5–5.2)
ALBUMIN/GLOB SERPL: 1.6 G/DL
ALP SERPL-CCNC: 84 U/L (ref 39–117)
ALT SERPL W P-5'-P-CCNC: 22 U/L (ref 1–33)
ANION GAP SERPL CALCULATED.3IONS-SCNC: 12.8 MMOL/L
AST SERPL-CCNC: 19 U/L (ref 1–32)
BASOPHILS # BLD AUTO: 0.04 10*3/MM3 (ref 0–0.2)
BASOPHILS NFR BLD AUTO: 0.5 % (ref 0–2)
BILIRUB SERPL-MCNC: 0.4 MG/DL (ref 0.1–1.2)
BILIRUB UR QL STRIP: NEGATIVE
BUN BLD-MCNC: 10 MG/DL (ref 8–23)
BUN/CREAT SERPL: 13.9 (ref 7–25)
CALCIUM SPEC-SCNC: 9.3 MG/DL (ref 8.6–10.5)
CHLORIDE SERPL-SCNC: 104 MMOL/L (ref 98–107)
CHOLEST SERPL-MCNC: 158 MG/DL (ref 0–200)
CLARITY UR: CLEAR
CO2 SERPL-SCNC: 24.2 MMOL/L (ref 22–29)
COLOR UR: YELLOW
CREAT BLD-MCNC: 0.72 MG/DL (ref 0.57–1)
DEPRECATED RDW RBC AUTO: 42.1 FL (ref 37–54)
EOSINOPHIL # BLD AUTO: 0.21 10*3/MM3 (ref 0–0.7)
EOSINOPHIL NFR BLD AUTO: 2.8 % (ref 0–5)
ERYTHROCYTE [DISTWIDTH] IN BLOOD BY AUTOMATED COUNT: 13.1 % (ref 11.5–15)
GFR SERPL CREATININE-BSD FRML MDRD: 81 ML/MIN/1.73
GLOBULIN UR ELPH-MCNC: 2.8 GM/DL
GLUCOSE BLD-MCNC: 125 MG/DL (ref 65–99)
GLUCOSE UR STRIP-MCNC: NEGATIVE MG/DL
HCT VFR BLD AUTO: 41.2 % (ref 34.1–44.9)
HDLC SERPL-MCNC: 59 MG/DL (ref 40–60)
HGB BLD-MCNC: 14.2 G/DL (ref 11.2–15.7)
HGB UR QL STRIP.AUTO: NEGATIVE
KETONES UR QL STRIP: NEGATIVE
LDLC SERPL CALC-MCNC: 81 MG/DL (ref 0–100)
LDLC/HDLC SERPL: 1.37 {RATIO}
LEUKOCYTE ESTERASE UR QL STRIP.AUTO: NEGATIVE
LYMPHOCYTES # BLD AUTO: 1.86 10*3/MM3 (ref 0.8–7)
LYMPHOCYTES NFR BLD AUTO: 25 % (ref 10–60)
MCH RBC QN AUTO: 30.6 PG (ref 26–34)
MCHC RBC AUTO-ENTMCNC: 34.5 G/DL (ref 31–37)
MCV RBC AUTO: 88.8 FL (ref 80–100)
MONOCYTES # BLD AUTO: 0.61 10*3/MM3 (ref 0–1)
MONOCYTES NFR BLD AUTO: 8.2 % (ref 0–13)
NEUTROPHILS # BLD AUTO: 4.71 10*3/MM3 (ref 1–11)
NEUTROPHILS NFR BLD AUTO: 63.5 % (ref 30–85)
NITRITE UR QL STRIP: NEGATIVE
PH UR STRIP.AUTO: 7 [PH] (ref 5–8)
PLATELET # BLD AUTO: 278 10*3/MM3 (ref 150–450)
PMV BLD AUTO: 10.6 FL (ref 6–12)
POTASSIUM BLD-SCNC: 4.1 MMOL/L (ref 3.5–5.2)
PROT SERPL-MCNC: 7.3 G/DL (ref 6–8.5)
PROT UR QL STRIP: NEGATIVE
RBC # BLD AUTO: 4.64 10*6/MM3 (ref 3.93–5.22)
SODIUM BLD-SCNC: 141 MMOL/L (ref 136–145)
SP GR UR STRIP: 1.02 (ref 1–1.03)
TRIGL SERPL-MCNC: 92 MG/DL (ref 0–150)
TSH SERPL DL<=0.05 MIU/L-ACNC: 1.26 MIU/ML (ref 0.27–4.2)
UROBILINOGEN UR QL STRIP: NORMAL
VLDLC SERPL-MCNC: 18.4 MG/DL (ref 5–40)
WBC NRBC COR # BLD: 7.43 10*3/MM3 (ref 5–10)

## 2018-08-10 PROCEDURE — 84443 ASSAY THYROID STIM HORMONE: CPT | Performed by: INTERNAL MEDICINE

## 2018-08-10 PROCEDURE — 80053 COMPREHEN METABOLIC PANEL: CPT | Performed by: INTERNAL MEDICINE

## 2018-08-10 PROCEDURE — 81003 URINALYSIS AUTO W/O SCOPE: CPT | Performed by: INTERNAL MEDICINE

## 2018-08-10 PROCEDURE — 36415 COLL VENOUS BLD VENIPUNCTURE: CPT | Performed by: INTERNAL MEDICINE

## 2018-08-10 PROCEDURE — 85025 COMPLETE CBC W/AUTO DIFF WBC: CPT | Performed by: INTERNAL MEDICINE

## 2018-08-10 PROCEDURE — 80061 LIPID PANEL: CPT | Performed by: INTERNAL MEDICINE

## 2018-08-11 DIAGNOSIS — I10 BENIGN ESSENTIAL HYPERTENSION: ICD-10-CM

## 2018-08-13 RX ORDER — AMLODIPINE BESYLATE 5 MG/1
TABLET ORAL
Qty: 30 TABLET | Refills: 3 | Status: SHIPPED | OUTPATIENT
Start: 2018-08-13 | End: 2018-08-17

## 2018-08-17 ENCOUNTER — OFFICE VISIT (OUTPATIENT)
Dept: INTERNAL MEDICINE | Facility: CLINIC | Age: 68
End: 2018-08-17

## 2018-08-17 VITALS
RESPIRATION RATE: 14 BRPM | SYSTOLIC BLOOD PRESSURE: 120 MMHG | BODY MASS INDEX: 27.32 KG/M2 | WEIGHT: 170 LBS | HEIGHT: 66 IN | DIASTOLIC BLOOD PRESSURE: 74 MMHG

## 2018-08-17 DIAGNOSIS — E78.00 HYPERCHOLESTEROLEMIA: ICD-10-CM

## 2018-08-17 DIAGNOSIS — R73.01 IMPAIRED FASTING BLOOD SUGAR: ICD-10-CM

## 2018-08-17 DIAGNOSIS — E88.81 METABOLIC SYNDROME: ICD-10-CM

## 2018-08-17 DIAGNOSIS — Z78.0 POST-MENOPAUSE: ICD-10-CM

## 2018-08-17 DIAGNOSIS — K76.0 STEATOSIS OF LIVER: ICD-10-CM

## 2018-08-17 DIAGNOSIS — Z12.31 VISIT FOR SCREENING MAMMOGRAM: ICD-10-CM

## 2018-08-17 DIAGNOSIS — I10 BENIGN ESSENTIAL HYPERTENSION: ICD-10-CM

## 2018-08-17 DIAGNOSIS — Z00.00 HEALTH CARE MAINTENANCE: Primary | ICD-10-CM

## 2018-08-17 PROBLEM — E88.810 METABOLIC SYNDROME: Status: ACTIVE | Noted: 2018-08-17

## 2018-08-17 PROCEDURE — 99214 OFFICE O/P EST MOD 30 MIN: CPT | Performed by: INTERNAL MEDICINE

## 2018-08-17 PROCEDURE — G0439 PPPS, SUBSEQ VISIT: HCPCS | Performed by: INTERNAL MEDICINE

## 2018-08-17 NOTE — PATIENT INSTRUCTIONS
"Annual wellness visit with preventive exam as well as age and risk appropriate councelling completed.    Cardiovascular disease councelling: current. Recommended: Continue statin and ASA., Needs better blood sugars control., Excellent cholesterol.  Diabetes screening and councelling: current. Recommended: Low carb diet recommended. Needs to avoid starches and sweets., Regular aerobic exercise recommended.  Breast cancer screening: is due. Will schedule..  Osteoporosis screening: is due, will schedule. Benefits explained..  Glaucoma screening: up to date.   AAA screening: had abd CT in 2017, no AAA.  Hep C screening (born between [birth year]) completed..  Colorectal cancer screening: up to date. Recommended every 10 years. Next screening is due in 2021..    Immunizations:   1. Influenza vaccine  is up to date and recommended yearly.   2. Pneumococcal vaccines: completed.   3. Shingles prevention:  Zostavax completed, recommended to get new shingles vaccine Shindrix at [facility], benefits explained.      Advanced directives planning: has Living Will. Discussed. I agree with patient wishes and decision..    Medications reviewed and medication list updated.   Potential harmful drug-disease interactions in the elderly:statin and impaired fasting blood sugar.  High risk medication in elderly: none  ASA use: continue daily ASA 81 mg.    HTN - well controlled with current medication regimen. Normal kidney tests and electrolytes. Recommended low salt diet. Continue same medical treatment.  Hyperlipidemia - well controlled with statin. Normal liver function tests. LDL target is below < 100 mg/dl (CHD or \"CHD risk equivalent\" is present). Patient's 81. Continue same treatment. Regular exercise recommended.  Prediabetes/impaired fasting blood sugars - Low carb diet and regular exercise recommended. Weight loss will be very beneficial. If not better in 6 months, will consider use of Metformin.  Fatty liver on on CT scan back in " 2012 - normal liver function tests. Recommended to loose weight and to limit alcohol intake: increased sensitivity and vulnerability of the liver to the alcohol discussed with patient.  Metabolic s-m - need to try to limit carbs and to loose weight. Regular exercise recommended.    Return in about 6 months (around 2/17/2019) for HTN, cholest. HTN.

## 2018-08-17 NOTE — PROGRESS NOTES
"Shelby Page is a 67 y.o. female.     History of Present Illness   /74 (BP Location: Left arm, Patient Position: Sitting, Cuff Size: Adult)   Resp 14   Ht 167.6 cm (66\")   Wt 77.1 kg (170 lb)   BMI 27.44 kg/m²   Patient is being seen for subsequent wellness visit.  Hospitalizations in a past: only for childbirth  Once per lifetime Medicare screening tests: ECG - had stress test 03/2017    AAA risk assessment: 1. FH: none. 2.H/o tobacco smoking: in remote past, as per . 3. CV or PAD: none.    Tobacco use: in remote past, as per    Alcohol use: 3-4 nights a week  Illicit drugs use: none  Diet: low carb  Exercise: 5-6 days a week, walking and yoga    Anxiety screening: How many days in the last 2 weeks you were  1. Feeling anxious, nervous, on edge: none  2. Unable to stop worrying: none  3. Worrying too much about different things: none  4. Having problems relaxing:none  5. Feeling restless or unable to sit still:none  6. Feeling irritable or easely annoyed: none  7. Being afraid that something awful might happen: none    Depression screening PHQ9:  Over the past 2 weeks how often have you been bothered by  1. Lack of interest or pleasuer in usual activities: none  2. Feeling down, depressed, hopeless:none  3. Troubles falling asleep/sleeping too long:none  4. Feeling tired, having little energy: none  5. Poor appetite or overeating: none  6. Feeling bad about yourself:none.  7. Trouble concentrating: at the baseline.  8. Moving or speaking too slow or much faster than usual: none  9. Thoughts about harming yourself:none.    Cognitive impairment screening:   Do you have difficulties with:  1. Language: no  2. Behavior: no  3. Learning/retaining new information: no  4. Handling complex tasks: no  5. Reasoning: no  6. Spacial ability and orientation: no    Hearing: normal.  Driving: unrestricted  ADL: independent  ADL details: Does patient needs help with:  telephone use: " "no  Transportation: no  Housework: no  Shopping: no  meal preparation: no  laundry: no  managing medication:no  Participate in the social activities: Y    Fall risk factors:   1.Use of alcohol: yes    2.Polypharmacy: yes  3.H/o of previous fall:  no  4. Mobility impairment:  no  5. Visual impairment:  no  6. Deconditioning: no  7. Use of antihypertensive medication:  yes  8. Use of sedatives: no  9. Use of antidepressant: yes    Home safety:   1.loose rugs: no   2.unfamiliar environment: no   3. Uneven floors: no   4. No grab bars in the bathroom: no  5. No banister on stairs: no      Advanced directives: has Living Will. Discussed. I agree with patient wishes and decision..    Co-managers: ophthalmology , dermatology , ortho , general surgeon       /74 (BP Location: Left arm, Patient Position: Sitting, Cuff Size: Adult)   Resp 14   Ht 167.6 cm (66\")   Wt 77.1 kg (170 lb)   BMI 27.44 kg/m²     Current Outpatient Prescriptions:   •  amLODIPine (NORVASC) 5 MG tablet, Take 1 tablet by mouth Daily., Disp: 90 tablet, Rfl: 3  •  aspirin 81 MG EC tablet, Take 81 mg by mouth Daily., Disp: , Rfl:   •  atorvastatin (LIPITOR) 20 MG tablet, TAKE ONE TABLET BY MOUTH DAILY, Disp: 90 tablet, Rfl: 1  •  Cholecalciferol (VITAMIN D) 1000 UNITS tablet, Take  by mouth., Disp: , Rfl:   •  Coenzyme Q10 (CO Q 10) 10 MG capsule, Take  by mouth Daily., Disp: , Rfl:   •  diclofenac sodium (VOTAREN XR) 100 MG 24 hr tablet, Take 1 tablet by mouth Daily., Disp: 30 tablet, Rfl: 5  •  DULoxetine (CYMBALTA) 60 MG capsule, TAKE ONE CAPSULE BY MOUTH DAILY, Disp: 90 capsule, Rfl: 1  •  fluticasone (FLONASE) 50 MCG/ACT nasal spray, 2 sprays into each nostril daily. Administer 2 sprays in each nostril for each dose., Disp: , Rfl:   •  Misc Natural Products (OSTEO BI-FLEX JOINT SHIELD) tablet, Take  by mouth., Disp: , Rfl:   •  Multiple Vitamin (MULTIVITAMIN) capsule, Take 1 capsule by mouth Daily., Disp: , " "Rfl:   •  olmesartan (BENICAR) 40 MG tablet, TAKE ONE TABLET BY MOUTH DAILY, Disp: 30 tablet, Rfl: 10  •  Probiotic Product (PRO-BIOTIC BLEND) capsule, Take  by mouth Daily., Disp: , Rfl:   •  propranolol (INDERAL) 60 MG tablet, Take 1 tablet by mouth 2 (Two) Times a Day., Disp: 60 tablet, Rfl: 11  •  psyllium (METAMUCIL) 58.6 % packet, Take 1 packet by mouth Daily., Disp: , Rfl:     Patient has long-standing benign essential HTN.  BP is usually well controlled with daily use of Ca channel blocker, b-blocker and ARB. On low salt diet with good compliance. Takes medication regularly. Denies chest pain, dyspnea,lightheadedness,  lower extremity edema. Patient does not check blood pressure    Patient has been diagnosed with hyperlipidemia. Target LDL is < 100 mg/dl (CHD or \"CHD risk equivalent\" is present). Patient is on low fat diet with good compliance. Patient is compliant with treatment: daily use of Lipitor (atorvastatin). Side effects of medication: none. Exercise 3-4 days a week, walking and yoga.    Patient had been noticed to have elevated fasting blood sugars.  Jana Page uses diet and exercise for blood sugars control. Patient does not check home blood sugars.. Compliance with low carb diabetic diet is fair.                                     The following portions of the patient's history were reviewed and updated as appropriate: allergies, current medications, past family history, past medical history, past social history, past surgical history and problem list.    Review of Systems   Constitutional: Negative for chills and fever.   HENT: Negative for postnasal drip, sinus pressure and sore throat.    Eyes: Negative for pain and itching.   Respiratory: Negative for cough and chest tightness.    Cardiovascular: Positive for leg swelling (mostly right ankle after prolonged sitting sometimes ). Negative for chest pain.   Gastrointestinal: Negative for abdominal pain and blood in stool.   Endocrine: " Negative for cold intolerance and heat intolerance.   Genitourinary: Negative for difficulty urinating and flank pain.   Musculoskeletal: Positive for arthralgias. Negative for back pain and neck pain.   Skin: Negative for color change and rash.   Neurological: Positive for light-headedness. Negative for dizziness, weakness and headaches.   Hematological: Negative for adenopathy. Does not bruise/bleed easily.   Psychiatric/Behavioral: Negative for sleep disturbance. The patient is not nervous/anxious.        Objective   Physical Exam   Constitutional: She is oriented to person, place, and time. Vital signs are normal. She appears well-developed and well-nourished. No distress.   Central weight districution   HENT:   Head: Normocephalic and atraumatic.   Right Ear: External ear normal.   Left Ear: External ear normal.   Nose: Nose normal. No mucosal edema. Right sinus exhibits no maxillary sinus tenderness and no frontal sinus tenderness. Left sinus exhibits no maxillary sinus tenderness and no frontal sinus tenderness.   Mouth/Throat: Oropharynx is clear and moist. No oropharyngeal exudate.   Eyes: Pupils are equal, round, and reactive to light. Conjunctivae, EOM and lids are normal. Right eye exhibits no discharge. Left eye exhibits no discharge. Right conjunctiva is not injected. Left conjunctiva is not injected. No scleral icterus. Right eye exhibits normal extraocular motion. Left eye exhibits normal extraocular motion.   Medial strobismus left eye   Neck: Normal range of motion and full passive range of motion without pain. Neck supple. No JVD present. Carotid bruit is not present. No thyromegaly present.   Cardiovascular: Normal rate, regular rhythm, S1 normal, S2 normal, normal heart sounds and intact distal pulses.  PMI is not displaced.  Exam reveals no gallop and no friction rub.    No murmur heard.  Pulmonary/Chest: Effort normal and breath sounds normal. No accessory muscle usage. No respiratory  distress. She has no decreased breath sounds. She has no wheezes. She has no rhonchi. She has no rales.   Abdominal: Soft. Bowel sounds are normal. She exhibits no distension, no pulsatile liver, no fluid wave, no abdominal bruit, no ascites and no mass. There is no tenderness. There is no rebound and no guarding.   Musculoskeletal: She exhibits no edema or deformity.   Lymphadenopathy:        Head (right side): No submental, no submandibular, no preauricular, no posterior auricular and no occipital adenopathy present.        Head (left side): No submental, no submandibular, no tonsillar, no preauricular, no posterior auricular and no occipital adenopathy present.     She has no cervical adenopathy.        Right cervical: No superficial cervical, no deep cervical and no posterior cervical adenopathy present.       Left cervical: No superficial cervical, no deep cervical and no posterior cervical adenopathy present.        Right: No supraclavicular adenopathy present.        Left: No supraclavicular adenopathy present.   Neurological: She is alert and oriented to person, place, and time. She has normal strength and normal reflexes. She displays normal reflexes. No cranial nerve deficit. She exhibits normal muscle tone. Coordination normal.   Reflex Scores:       Bicep reflexes are 2+ on the right side and 2+ on the left side.       Patellar reflexes are 2+ on the right side and 2+ on the left side.  Skin: Skin is warm and dry. No rash noted. She is not diaphoretic. No erythema.   Psychiatric: She has a normal mood and affect. Her speech is normal and behavior is normal. Thought content normal.   Vitals reviewed.      Assessment/Plan   Jana was seen today for subsequent medicare wellness, hypertension, hyperlipidemia and prediabetes.    Diagnoses and all orders for this visit:    Health care maintenance    Benign essential hypertension    Hypercholesterolemia  -     Comprehensive Metabolic Panel; Future  -     Lipid  "Panel; Future    Impaired fasting blood sugar  -     Hemoglobin A1c; Future    Steatosis of liver    Post-menopause  -     DEXA Bone Density Axial; Future    Visit for screening mammogram  -     Mammo Screening Bilateral With CAD    Metabolic syndrome        Annual wellness visit with preventive exam as well as age and risk appropriate councelling completed.    Cardiovascular disease councelling: current. Recommended: Continue statin and ASA., Needs better blood sugars control., Excellent cholesterol.  Diabetes screening and councelling: current. Recommended: Low carb diet recommended. Needs to avoid starches and sweets., Regular aerobic exercise recommended.  Breast cancer screening: is due. Will schedule..  Osteoporosis screening: is due, will schedule. Benefits explained..  Glaucoma screening: up to date.   AAA screening: had abd CT in 2017, no AAA.  Hep C screening (born between [birth year]) completed..  Colorectal cancer screening: up to date. Recommended every 10 years. Next screening is due in 2021..    Immunizations:   1. Influenza vaccine  is up to date and recommended yearly.   2. Pneumococcal vaccines: completed.   3. Shingles prevention:  Zostavax completed, recommended to get new shingles vaccine Shindrix at [facility], benefits explained.      Advanced directives planning: has Living Will. Discussed. I agree with patient wishes and decision..    Medications reviewed and medication list updated.   Potential harmful drug-disease interactions in the elderly:statin and impaired fasting blood sugar.  High risk medication in elderly: none  ASA use: continue daily ASA 81 mg.    HTN - well controlled with current medication regimen. Normal kidney tests and electrolytes. Recommended low salt diet. Continue same medical treatment.  Hyperlipidemia - well controlled with statin. Normal liver function tests. LDL target is below < 100 mg/dl (CHD or \"CHD risk equivalent\" is present). Patient's 81. Continue same " treatment. Regular exercise recommended.  Prediabetes/impaired fasting blood sugars - Low carb diet and regular exercise recommended. Weight loss will be very beneficial. If not better in 6 months, will consider use of Metformin.  Fatty liver on on CT scan back in 2012 - normal liver function tests. Recommended to loose weight and to limit alcohol intake: increased sensitivity and vulnerability of the liver to the alcohol discussed with patient.  Metabolic s-m - need to try to limit carbs and to loose weight. Regular exercise recommended.

## 2018-09-14 ENCOUNTER — APPOINTMENT (OUTPATIENT)
Dept: WOMENS IMAGING | Facility: HOSPITAL | Age: 68
End: 2018-09-14

## 2018-09-14 PROCEDURE — 77067 SCR MAMMO BI INCL CAD: CPT | Performed by: RADIOLOGY

## 2018-09-14 PROCEDURE — MDREVIEWSP: Performed by: RADIOLOGY

## 2018-09-14 PROCEDURE — 77063 BREAST TOMOSYNTHESIS BI: CPT | Performed by: RADIOLOGY

## 2018-09-19 ENCOUNTER — TELEPHONE (OUTPATIENT)
Dept: INTERNAL MEDICINE | Facility: CLINIC | Age: 68
End: 2018-09-19

## 2018-09-19 NOTE — TELEPHONE ENCOUNTER
----- Message from Izzy Santillan sent at 9/19/2018 10:05 AM EDT -----  Contact: pt - Dr Newberry's pt - RE: discuss / option  Pt calling and would like a return call regarding pt taking Cinnamon, 1000 mg. She informs that there are a varity of choices. She would like to know which one she should be taking. She would like to know that kind she should be taking and the dosage. Could you please call pt to discuss? Please advise. Thanks    Pt # [phone]

## 2018-09-19 NOTE — TELEPHONE ENCOUNTER
I have no suggestions. This is not a medication, does not go throw FDA testing and approval process and no clinicval studies had been conducted with this supplement.

## 2018-10-03 DIAGNOSIS — E78.00 HYPERCHOLESTEROLEMIA: ICD-10-CM

## 2018-10-03 DIAGNOSIS — F41.9 ANXIETY: ICD-10-CM

## 2018-10-03 DIAGNOSIS — R42 LIGHTHEADEDNESS: ICD-10-CM

## 2018-10-03 RX ORDER — DULOXETIN HYDROCHLORIDE 60 MG/1
CAPSULE, DELAYED RELEASE ORAL
Qty: 90 CAPSULE | Refills: 0 | Status: SHIPPED | OUTPATIENT
Start: 2018-10-03 | End: 2019-03-26 | Stop reason: SDUPTHER

## 2018-10-03 RX ORDER — ATORVASTATIN CALCIUM 20 MG/1
TABLET, FILM COATED ORAL
Qty: 90 TABLET | Refills: 0 | Status: SHIPPED | OUTPATIENT
Start: 2018-10-03 | End: 2019-02-05 | Stop reason: SDUPTHER

## 2018-12-06 DIAGNOSIS — I10 BENIGN ESSENTIAL HYPERTENSION: ICD-10-CM

## 2018-12-06 RX ORDER — AMLODIPINE BESYLATE 5 MG/1
TABLET ORAL
Qty: 30 TABLET | Refills: 2 | Status: SHIPPED | OUTPATIENT
Start: 2018-12-06 | End: 2019-02-19 | Stop reason: SDUPTHER

## 2019-02-05 DIAGNOSIS — E78.00 HYPERCHOLESTEROLEMIA: ICD-10-CM

## 2019-02-05 RX ORDER — ATORVASTATIN CALCIUM 20 MG/1
TABLET, FILM COATED ORAL
Qty: 90 TABLET | Refills: 0 | Status: SHIPPED | OUTPATIENT
Start: 2019-02-05 | End: 2019-05-07 | Stop reason: SDUPTHER

## 2019-02-12 ENCOUNTER — LAB (OUTPATIENT)
Dept: INTERNAL MEDICINE | Facility: CLINIC | Age: 69
End: 2019-02-12

## 2019-02-12 DIAGNOSIS — R73.01 IMPAIRED FASTING BLOOD SUGAR: ICD-10-CM

## 2019-02-12 DIAGNOSIS — E78.00 HYPERCHOLESTEROLEMIA: ICD-10-CM

## 2019-02-12 LAB
ALBUMIN SERPL-MCNC: 4.8 G/DL (ref 3.5–5.2)
ALBUMIN/GLOB SERPL: 2 G/DL
ALP SERPL-CCNC: 78 U/L (ref 39–117)
ALT SERPL W P-5'-P-CCNC: 22 U/L (ref 1–33)
ANION GAP SERPL CALCULATED.3IONS-SCNC: 14.5 MMOL/L
AST SERPL-CCNC: 17 U/L (ref 1–32)
BILIRUB SERPL-MCNC: 0.7 MG/DL (ref 0.1–1.2)
BUN BLD-MCNC: 11 MG/DL (ref 8–23)
BUN/CREAT SERPL: 16.2 (ref 7–25)
CALCIUM SPEC-SCNC: 9.8 MG/DL (ref 8.6–10.5)
CHLORIDE SERPL-SCNC: 102 MMOL/L (ref 98–107)
CHOLEST SERPL-MCNC: 159 MG/DL (ref 0–200)
CO2 SERPL-SCNC: 23.5 MMOL/L (ref 22–29)
CREAT BLD-MCNC: 0.68 MG/DL (ref 0.57–1)
GFR SERPL CREATININE-BSD FRML MDRD: 86 ML/MIN/1.73
GLOBULIN UR ELPH-MCNC: 2.4 GM/DL
GLUCOSE BLD-MCNC: 126 MG/DL (ref 65–99)
HBA1C MFR BLD: 5.6 % (ref 4.8–5.6)
HDLC SERPL-MCNC: 62 MG/DL (ref 40–60)
LDLC SERPL CALC-MCNC: 73 MG/DL (ref 0–100)
LDLC/HDLC SERPL: 1.17 {RATIO}
POTASSIUM BLD-SCNC: 4 MMOL/L (ref 3.5–5.2)
PROT SERPL-MCNC: 7.2 G/DL (ref 6–8.5)
SODIUM BLD-SCNC: 140 MMOL/L (ref 136–145)
TRIGL SERPL-MCNC: 122 MG/DL (ref 0–150)
VLDLC SERPL-MCNC: 24.4 MG/DL (ref 5–40)

## 2019-02-12 PROCEDURE — 80053 COMPREHEN METABOLIC PANEL: CPT | Performed by: INTERNAL MEDICINE

## 2019-02-12 PROCEDURE — 80061 LIPID PANEL: CPT | Performed by: INTERNAL MEDICINE

## 2019-02-12 PROCEDURE — 83036 HEMOGLOBIN GLYCOSYLATED A1C: CPT | Performed by: INTERNAL MEDICINE

## 2019-02-12 PROCEDURE — 36415 COLL VENOUS BLD VENIPUNCTURE: CPT | Performed by: INTERNAL MEDICINE

## 2019-02-19 ENCOUNTER — OFFICE VISIT (OUTPATIENT)
Dept: INTERNAL MEDICINE | Facility: CLINIC | Age: 69
End: 2019-02-19

## 2019-02-19 ENCOUNTER — CLINICAL SUPPORT (OUTPATIENT)
Dept: INTERNAL MEDICINE | Facility: CLINIC | Age: 69
End: 2019-02-19

## 2019-02-19 VITALS
HEIGHT: 66 IN | WEIGHT: 166 LBS | DIASTOLIC BLOOD PRESSURE: 74 MMHG | RESPIRATION RATE: 14 BRPM | BODY MASS INDEX: 26.68 KG/M2 | SYSTOLIC BLOOD PRESSURE: 120 MMHG

## 2019-02-19 DIAGNOSIS — E78.00 HYPERCHOLESTEROLEMIA: ICD-10-CM

## 2019-02-19 DIAGNOSIS — I10 BENIGN ESSENTIAL HYPERTENSION: Primary | ICD-10-CM

## 2019-02-19 DIAGNOSIS — R73.01 IMPAIRED FASTING BLOOD SUGAR: ICD-10-CM

## 2019-02-19 DIAGNOSIS — Z78.0 POST-MENOPAUSE: ICD-10-CM

## 2019-02-19 PROCEDURE — 99214 OFFICE O/P EST MOD 30 MIN: CPT | Performed by: INTERNAL MEDICINE

## 2019-02-19 PROCEDURE — 77080 DXA BONE DENSITY AXIAL: CPT | Performed by: INTERNAL MEDICINE

## 2019-02-19 NOTE — PROGRESS NOTES
"Subjective   Jana Page is a 68 y.o. female.     History of Present Illness     /74 (BP Location: Left arm, Patient Position: Sitting, Cuff Size: Adult)   Resp 14   Ht 167.6 cm (66\")   Wt 75.3 kg (166 lb)   BMI 26.79 kg/m²     Current Outpatient Medications:   •  amLODIPine (NORVASC) 5 MG tablet, Take 1 tablet by mouth Daily., Disp: 90 tablet, Rfl: 3  •  aspirin 81 MG EC tablet, Take 81 mg by mouth Daily., Disp: , Rfl:   •  atorvastatin (LIPITOR) 20 MG tablet, TAKE ONE TABLET BY MOUTH DAILY, Disp: 90 tablet, Rfl: 0  •  Cholecalciferol (VITAMIN D) 1000 UNITS tablet, Take  by mouth., Disp: , Rfl:   •  Coenzyme Q10 (CO Q 10) 10 MG capsule, Take  by mouth Daily., Disp: , Rfl:   •  diclofenac sodium (VOTAREN XR) 100 MG 24 hr tablet, Take 1 tablet by mouth Daily., Disp: 30 tablet, Rfl: 5  •  DULoxetine (CYMBALTA) 60 MG capsule, TAKE ONE CAPSULE BY MOUTH DAILY, Disp: 90 capsule, Rfl: 0  •  fluticasone (FLONASE) 50 MCG/ACT nasal spray, 2 sprays into each nostril daily. Administer 2 sprays in each nostril for each dose., Disp: , Rfl:   •  Multiple Vitamin (MULTIVITAMIN) capsule, Take 1 capsule by mouth Daily., Disp: , Rfl:   •  olmesartan (BENICAR) 40 MG tablet, TAKE ONE TABLET BY MOUTH DAILY, Disp: 30 tablet, Rfl: 10  •  Probiotic Product (PRO-BIOTIC BLEND) capsule, Take  by mouth Daily., Disp: , Rfl:   •  propranolol (INDERAL) 60 MG tablet, Take 1 tablet by mouth 2 (Two) Times a Day., Disp: 60 tablet, Rfl: 11  •  psyllium (METAMUCIL) 58.6 % packet, Take 1 packet by mouth Daily., Disp: , Rfl:     Patient has long-standing benign essential HTN.  BP is usually well controlled with daily use of Ca channel blocker and ARB. On low salt diet with good compliance. Takes medication regularly. Denies chest pain, dyspnea,lightheadedness,  lower extremity edema. Patient checks blood pressure at home regularly. Reports that all numbers are in the normal range.    Patient has been diagnosed with hyperlipidemia. Target " "LDL is < 100 mg/dl (CHD or \"CHD risk equivalent\" is present). Patient is on low fat diet with good compliance. Patient is compliant with treatment: daily use of Lipitor (atorvastatin). Side effects of medication: none. Exercise 3-4 days a week and yoga.    The following portions of the patient's history were reviewed and updated as appropriate: allergies, current medications, past family history, past medical history, past social history, past surgical history and problem list.    Review of Systems   Constitutional: Negative for chills and fever.   Eyes: Negative for pain and redness.   Respiratory: Negative for cough and shortness of breath.    Cardiovascular: Negative for chest pain and leg swelling.   Neurological: Negative for dizziness and headaches.       Objective   Physical Exam   Constitutional: She is oriented to person, place, and time. She appears well-developed and well-nourished.   HENT:   Head: Normocephalic and atraumatic.   Right Ear: Tympanic membrane, external ear and ear canal normal.   Left Ear: Tympanic membrane, external ear and ear canal normal.   Nose: Nose normal. Right sinus exhibits no maxillary sinus tenderness and no frontal sinus tenderness. Left sinus exhibits no maxillary sinus tenderness and no frontal sinus tenderness.   Mouth/Throat: Uvula is midline, oropharynx is clear and moist and mucous membranes are normal.   Eyes: Conjunctivae and EOM are normal. Pupils are equal, round, and reactive to light. Right eye exhibits no discharge. Left eye exhibits no discharge. No scleral icterus.   Neck: Neck supple. No JVD present.   Cardiovascular: Normal rate, regular rhythm and normal heart sounds. Exam reveals no gallop and no friction rub.   No murmur heard.  Pulmonary/Chest: Effort normal and breath sounds normal. She has no wheezes. She has no rales.   Musculoskeletal: She exhibits no edema.   Lymphadenopathy:     She has no cervical adenopathy.   Neurological: She is alert and " "oriented to person, place, and time. No cranial nerve deficit.   Skin: Skin is warm and dry. No rash noted.   Psychiatric: She has a normal mood and affect. Her behavior is normal.   Vitals reviewed.      Assessment/Plan   Jana was seen today for hypertension and hyperlipidemia.    Diagnoses and all orders for this visit:    Benign essential hypertension  -     Comprehensive Metabolic Panel; Future  -     TSH; Future  -     Urinalysis With Microscopic If Indicated (No Culture) - Urine, Clean Catch; Future    Hypercholesterolemia  -     CBC & Differential; Future  -     Comprehensive Metabolic Panel; Future  -     Lipid Panel; Future  -     TSH; Future    Impaired fasting blood sugar  -     Hemoglobin A1c; Future      HTN - well controlled with current medication regimen. Normal kidney tests and electrolytes. Recommended low salt diet. Continue same medical treatment.  Hyperlipidemia - well controlled with statin. Normal liver function tests. LDL target is below < 100 mg/dl (CHD or \"CHD risk equivalent\" is present). Patient's 73. Continue same treatment. Regular exercise recommended.  Impaired fasting blood sugar/prediabetes. Hb A1C is   Lab Results   Component Value Date    HGBA1C 5.60 02/12/2019   Low carb diet and regular exercise recommended. Diet discussed. Regular exercise recommended.  HM - DEXA report discussed with patient, given explanations. No interventions needed at that time.       "

## 2019-02-19 NOTE — PATIENT INSTRUCTIONS
"HTN - well controlled with current medication regimen. Normal kidney tests and electrolytes. Recommended low salt diet. Continue same medical treatment.  Hyperlipidemia - well controlled with statin. Normal liver function tests. LDL target is below < 100 mg/dl (CHD or \"CHD risk equivalent\" is present). Patient's 73. Continue same treatment. Regular exercise recommended.  Impaired fasting blood sugar/prediabetes (fasting blood sugar was 126- normal is below 100). Hb A1C is   Lab Results   Component Value Date    HGBA1C 5.60 02/12/2019   Low carb diet and regular exercise recommended. Diet discussed. Regular exercise recommended.      "

## 2019-02-22 DIAGNOSIS — I10 BENIGN ESSENTIAL HYPERTENSION: ICD-10-CM

## 2019-02-22 RX ORDER — AMLODIPINE BESYLATE 5 MG/1
5 TABLET ORAL DAILY
Qty: 90 TABLET | Refills: 3 | Status: SHIPPED | OUTPATIENT
Start: 2019-02-22 | End: 2019-02-22 | Stop reason: SDUPTHER

## 2019-02-22 RX ORDER — AMLODIPINE BESYLATE 5 MG/1
5 TABLET ORAL DAILY
Qty: 90 TABLET | Refills: 1 | Status: SHIPPED | OUTPATIENT
Start: 2019-02-22 | End: 2020-03-10

## 2019-02-22 RX ORDER — AMLODIPINE BESYLATE 5 MG/1
TABLET ORAL
Qty: 30 TABLET | Refills: 5 | Status: SHIPPED | OUTPATIENT
Start: 2019-02-22 | End: 2019-02-22

## 2019-02-22 NOTE — TELEPHONE ENCOUNTER
----- Message from More Giron sent at 2/22/2019  9:21 AM EST -----  Contact: Pt   Pt is calling for a refill     FOR  amLODIPine (NORVASC) 5 MG tablet- 90 days       Patient requests RX SENT TO   [facility] [address] [place] [address] - [facility] [address] [facility] [address]  - [phone] [facility] [phone] FX      Caller# [phone]     Please and thank you.

## 2019-03-07 DIAGNOSIS — I10 BENIGN ESSENTIAL HYPERTENSION: ICD-10-CM

## 2019-03-07 RX ORDER — PROPRANOLOL HYDROCHLORIDE 60 MG/1
TABLET ORAL
Qty: 60 TABLET | Refills: 10 | Status: SHIPPED | OUTPATIENT
Start: 2019-03-07 | End: 2020-02-10

## 2019-03-26 DIAGNOSIS — R42 LIGHTHEADEDNESS: ICD-10-CM

## 2019-03-26 DIAGNOSIS — F41.9 ANXIETY: ICD-10-CM

## 2019-03-26 RX ORDER — DULOXETIN HYDROCHLORIDE 60 MG/1
CAPSULE, DELAYED RELEASE ORAL
Qty: 90 CAPSULE | Refills: 0 | Status: SHIPPED | OUTPATIENT
Start: 2019-03-26 | End: 2019-07-12 | Stop reason: SDUPTHER

## 2019-05-07 DIAGNOSIS — E78.00 HYPERCHOLESTEROLEMIA: ICD-10-CM

## 2019-05-07 RX ORDER — ATORVASTATIN CALCIUM 20 MG/1
TABLET, FILM COATED ORAL
Qty: 90 TABLET | Refills: 1 | Status: SHIPPED | OUTPATIENT
Start: 2019-05-07 | End: 2019-11-02 | Stop reason: SDUPTHER

## 2019-05-23 DIAGNOSIS — I10 BENIGN ESSENTIAL HYPERTENSION: ICD-10-CM

## 2019-05-23 RX ORDER — OLMESARTAN MEDOXOMIL 40 MG/1
TABLET ORAL
Qty: 30 TABLET | Refills: 9 | Status: SHIPPED | OUTPATIENT
Start: 2019-05-23 | End: 2020-06-25 | Stop reason: SDUPTHER

## 2019-06-28 DIAGNOSIS — I10 BENIGN ESSENTIAL HYPERTENSION: ICD-10-CM

## 2019-07-01 RX ORDER — OLMESARTAN MEDOXOMIL 40 MG/1
TABLET ORAL
Qty: 30 TABLET | Refills: 9 | Status: SHIPPED | OUTPATIENT
Start: 2019-07-01 | End: 2019-08-30 | Stop reason: SDUPTHER

## 2019-07-12 DIAGNOSIS — F41.9 ANXIETY: ICD-10-CM

## 2019-07-12 DIAGNOSIS — R42 LIGHTHEADEDNESS: ICD-10-CM

## 2019-07-12 RX ORDER — DULOXETIN HYDROCHLORIDE 60 MG/1
CAPSULE, DELAYED RELEASE ORAL
Qty: 90 CAPSULE | Refills: 0 | Status: SHIPPED | OUTPATIENT
Start: 2019-07-12 | End: 2019-11-06 | Stop reason: SDUPTHER

## 2019-07-22 DIAGNOSIS — M25.541 ARTHRALGIA OF METACARPOPHALANGEAL JOINT, RIGHT: ICD-10-CM

## 2019-07-25 ENCOUNTER — TELEPHONE (OUTPATIENT)
Dept: INTERNAL MEDICINE | Facility: CLINIC | Age: 69
End: 2019-07-25

## 2019-07-25 ENCOUNTER — OFFICE VISIT (OUTPATIENT)
Dept: INTERNAL MEDICINE | Facility: CLINIC | Age: 69
End: 2019-07-25

## 2019-07-25 VITALS
DIASTOLIC BLOOD PRESSURE: 76 MMHG | SYSTOLIC BLOOD PRESSURE: 112 MMHG | WEIGHT: 171 LBS | RESPIRATION RATE: 14 BRPM | BODY MASS INDEX: 27.48 KG/M2 | HEIGHT: 66 IN

## 2019-07-25 DIAGNOSIS — R10.31 RIGHT LOWER QUADRANT ABDOMINAL PAIN: Primary | ICD-10-CM

## 2019-07-25 LAB
BASOPHILS # BLD AUTO: 0.04 10*3/MM3 (ref 0–0.2)
BASOPHILS NFR BLD AUTO: 0.4 % (ref 0–1.5)
DEPRECATED RDW RBC AUTO: 39.8 FL (ref 37–54)
EOSINOPHIL # BLD AUTO: 0.2 10*3/MM3 (ref 0–0.4)
EOSINOPHIL NFR BLD AUTO: 2.1 % (ref 0.3–6.2)
ERYTHROCYTE [DISTWIDTH] IN BLOOD BY AUTOMATED COUNT: 12.7 % (ref 12.3–15.4)
HCT VFR BLD AUTO: 40.3 % (ref 34–46.6)
HGB BLD-MCNC: 14.1 G/DL (ref 12–15.9)
LYMPHOCYTES # BLD AUTO: 2.42 10*3/MM3 (ref 0.7–3.1)
LYMPHOCYTES NFR BLD AUTO: 25.4 % (ref 19.6–45.3)
MCH RBC QN AUTO: 30.4 PG (ref 26.6–33)
MCHC RBC AUTO-ENTMCNC: 35 G/DL (ref 31.5–35.7)
MCV RBC AUTO: 86.9 FL (ref 79–97)
MONOCYTES # BLD AUTO: 0.83 10*3/MM3 (ref 0.1–0.9)
MONOCYTES NFR BLD AUTO: 8.7 % (ref 5–12)
NEUTROPHILS # BLD AUTO: 6.05 10*3/MM3 (ref 1.7–7)
NEUTROPHILS NFR BLD AUTO: 63.4 % (ref 42.7–76)
PLATELET # BLD AUTO: 293 10*3/MM3 (ref 140–450)
PMV BLD AUTO: 10.4 FL (ref 6–12)
RBC # BLD AUTO: 4.64 10*6/MM3 (ref 3.77–5.28)
WBC NRBC COR # BLD: 9.54 10*3/MM3 (ref 3.4–10.8)

## 2019-07-25 PROCEDURE — 99213 OFFICE O/P EST LOW 20 MIN: CPT | Performed by: INTERNAL MEDICINE

## 2019-07-25 PROCEDURE — 85025 COMPLETE CBC W/AUTO DIFF WBC: CPT | Performed by: INTERNAL MEDICINE

## 2019-07-25 RX ORDER — CIPROFLOXACIN 500 MG/1
500 TABLET, FILM COATED ORAL 2 TIMES DAILY
Qty: 14 TABLET | Refills: 0 | Status: SHIPPED | OUTPATIENT
Start: 2019-07-25 | End: 2019-08-30

## 2019-07-25 NOTE — TELEPHONE ENCOUNTER
----- Message from More Giron sent at 7/25/2019  8:15 AM EDT -----  Contact: Pt   Pt is calling complaining of stomach burning, feels it is another diverticulitis flare, she hasn't had one in about a year, states its an on fire burning in abdomen.  She wanted to know if there was anything she should be doing.    Pt# [phone]

## 2019-07-25 NOTE — PROGRESS NOTES
"Subjective   Jana Page is a 68 y.o. female.     History of Present Illness   /76 (BP Location: Left arm, Patient Position: Sitting, Cuff Size: Adult)   Resp 14   Ht 167.6 cm (66\")   Wt 77.6 kg (171 lb)   BMI 27.60 kg/m²   Patient presents to the office today complaining of b urningin the RLQ of the abdomen. Symptoms started this morning. She had no nausea or vomiting, but had some diarrhea this am, no blood in stool. Patient describes the problem as intermittent. Symptoms are aggravated by nothing. Actually she feels some better in the afternoon.  Patient had tried no OTC meds .  Medical history is significant for Diverticultis . Patient admits [id number] at [employer] last night, and perhaps had some spicy food..        Current Outpatient Medications:   •  amLODIPine (NORVASC) 5 MG tablet, Take 1 tablet by mouth Daily., Disp: 90 tablet, Rfl: 1  •  aspirin 81 MG EC tablet, Take 81 mg by mouth Daily., Disp: , Rfl:   •  atorvastatin (LIPITOR) 20 MG tablet, TAKE ONE TABLET BY MOUTH DAILY, Disp: 90 tablet, Rfl: 1  •  Cholecalciferol (VITAMIN D) 1000 UNITS tablet, Take  by mouth., Disp: , Rfl:   •  Coenzyme Q10 (CO Q 10) 10 MG capsule, Take  by mouth Daily., Disp: , Rfl:   •  diclofenac sodium (VOTAREN XR) 100 MG 24 hr tablet, TAKE ONE TABLET BY MOUTH DAILY, Disp: 30 tablet, Rfl: 4  •  DULoxetine (CYMBALTA) 60 MG capsule, TAKE ONE CAPSULE BY MOUTH DAILY, Disp: 90 capsule, Rfl: 0  •  fluticasone (FLONASE) 50 MCG/ACT nasal spray, 2 sprays into each nostril daily. Administer 2 sprays in each nostril for each dose., Disp: , Rfl:   •  Multiple Vitamin (MULTIVITAMIN) capsule, Take 1 capsule by mouth Daily., Disp: , Rfl:   •  olmesartan (BENICAR) 40 MG tablet, TAKE ONE TABLET BY MOUTH DAILY, Disp: 30 tablet, Rfl: 9  •  olmesartan (BENICAR) 40 MG tablet, TAKE ONE TABLET BY MOUTH DAILY, Disp: 30 tablet, Rfl: 9  •  Probiotic Product (PRO-BIOTIC BLEND) capsule, Take  by mouth Daily., Disp: , Rfl:   •  propranolol " (INDERAL) 60 MG tablet, TAKE ONE TABLET BY MOUTH TWICE A DAY, Disp: 60 tablet, Rfl: 10  •  psyllium (METAMUCIL) 58.6 % packet, Take 1 packet by mouth Daily., Disp: , Rfl:   The following portions of the patient's history were reviewed and updated as appropriate: allergies, current medications, past family history, past medical history, past social history, past surgical history and problem list.    Review of Systems   Constitutional: Negative for chills and fever.   Eyes: Negative for pain and redness.   Respiratory: Negative for cough and shortness of breath.    Cardiovascular: Negative for chest pain and leg swelling.   Neurological: Negative for dizziness and headaches.       Objective   Physical Exam   Constitutional: She is oriented to person, place, and time. She appears well-developed and well-nourished.   Central weight distribution   HENT:   Head: Normocephalic and atraumatic.   Right Ear: Tympanic membrane, external ear and ear canal normal.   Left Ear: Tympanic membrane, external ear and ear canal normal.   Nose: Nose normal. Right sinus exhibits no maxillary sinus tenderness and no frontal sinus tenderness. Left sinus exhibits no maxillary sinus tenderness and no frontal sinus tenderness.   Mouth/Throat: Uvula is midline, oropharynx is clear and moist and mucous membranes are normal.   Eyes: Conjunctivae and EOM are normal. Pupils are equal, round, and reactive to light. Right eye exhibits no discharge. Left eye exhibits no discharge. No scleral icterus.   Neck: Neck supple. No JVD present.   Cardiovascular: Normal rate, regular rhythm and normal heart sounds. Exam reveals no gallop and no friction rub.   No murmur heard.  Pulmonary/Chest: Effort normal and breath sounds normal. She has no wheezes. She has no rales.   Abdominal: Soft. Bowel sounds are normal. She exhibits no distension. There is tenderness (minimal RLQ ).   Musculoskeletal: She exhibits no edema.   Lymphadenopathy:     She has no  cervical adenopathy.   Neurological: She is alert and oriented to person, place, and time. No cranial nerve deficit.   Skin: Skin is warm and dry. No rash noted.   Psychiatric: She has a normal mood and affect. Her behavior is normal.   Vitals reviewed.      Assessment/Plan   Jana was seen today for gi problem.    Diagnoses and all orders for this visit:    Right lower quadrant abdominal pain  -     CBC & Differential  -     Urinalysis With Microscopic If Indicated (No Culture) - Urine, Clean Catch    RLQ pain - possibly localized colitis - will check CBC and UA (possibly UTI as well), asked patient to hydrate well and to stay on bland diet.   Addendum: as her white cell count is mildly elevated, will give few days of ciprofloxacin.  Continue probiotics.

## 2019-07-25 NOTE — PROGRESS NOTES
Please call patient: Your white cell count is mildly elevated, I had sent prescription for ciprofloxacin to your pharmacy she is to take it twice a day.

## 2019-08-16 ENCOUNTER — LAB (OUTPATIENT)
Dept: INTERNAL MEDICINE | Facility: CLINIC | Age: 69
End: 2019-08-16

## 2019-08-16 DIAGNOSIS — E78.00 HYPERCHOLESTEROLEMIA: ICD-10-CM

## 2019-08-16 DIAGNOSIS — R73.01 IMPAIRED FASTING BLOOD SUGAR: ICD-10-CM

## 2019-08-16 DIAGNOSIS — I10 BENIGN ESSENTIAL HYPERTENSION: ICD-10-CM

## 2019-08-16 LAB
ALBUMIN SERPL-MCNC: 4.7 G/DL (ref 3.5–5.2)
ALBUMIN/GLOB SERPL: 1.7 G/DL
ALP SERPL-CCNC: 89 U/L (ref 39–117)
ALT SERPL W P-5'-P-CCNC: 24 U/L (ref 1–33)
ANION GAP SERPL CALCULATED.3IONS-SCNC: 11.5 MMOL/L (ref 5–15)
AST SERPL-CCNC: 15 U/L (ref 1–32)
BASOPHILS # BLD AUTO: 0.04 10*3/MM3 (ref 0–0.2)
BASOPHILS NFR BLD AUTO: 0.5 % (ref 0–1.5)
BILIRUB SERPL-MCNC: 0.5 MG/DL (ref 0.2–1.2)
BILIRUB UR QL STRIP: NEGATIVE
BUN BLD-MCNC: 13 MG/DL (ref 8–23)
BUN/CREAT SERPL: 19.7 (ref 7–25)
CALCIUM SPEC-SCNC: 9.6 MG/DL (ref 8.6–10.5)
CHLORIDE SERPL-SCNC: 103 MMOL/L (ref 98–107)
CHOLEST SERPL-MCNC: 175 MG/DL (ref 0–200)
CLARITY UR: CLEAR
CO2 SERPL-SCNC: 25.5 MMOL/L (ref 22–29)
COLOR UR: YELLOW
CREAT BLD-MCNC: 0.66 MG/DL (ref 0.57–1)
DEPRECATED RDW RBC AUTO: 41.1 FL (ref 37–54)
EOSINOPHIL # BLD AUTO: 0.22 10*3/MM3 (ref 0–0.4)
EOSINOPHIL NFR BLD AUTO: 2.9 % (ref 0.3–6.2)
ERYTHROCYTE [DISTWIDTH] IN BLOOD BY AUTOMATED COUNT: 13.1 % (ref 12.3–15.4)
GFR SERPL CREATININE-BSD FRML MDRD: 89 ML/MIN/1.73
GLOBULIN UR ELPH-MCNC: 2.8 GM/DL
GLUCOSE BLD-MCNC: 137 MG/DL (ref 65–99)
GLUCOSE UR STRIP-MCNC: NEGATIVE MG/DL
HBA1C MFR BLD: 5.7 % (ref 4.8–5.6)
HCT VFR BLD AUTO: 42.3 % (ref 34–46.6)
HDLC SERPL-MCNC: 55 MG/DL (ref 40–60)
HGB BLD-MCNC: 14.6 G/DL (ref 12–15.9)
HGB UR QL STRIP.AUTO: NEGATIVE
KETONES UR QL STRIP: NEGATIVE
LDLC SERPL CALC-MCNC: 98 MG/DL (ref 0–100)
LDLC/HDLC SERPL: 1.78 {RATIO}
LEUKOCYTE ESTERASE UR QL STRIP.AUTO: NEGATIVE
LYMPHOCYTES # BLD AUTO: 1.49 10*3/MM3 (ref 0.7–3.1)
LYMPHOCYTES NFR BLD AUTO: 19.9 % (ref 19.6–45.3)
MCH RBC QN AUTO: 30.2 PG (ref 26.6–33)
MCHC RBC AUTO-ENTMCNC: 34.5 G/DL (ref 31.5–35.7)
MCV RBC AUTO: 87.6 FL (ref 79–97)
MONOCYTES # BLD AUTO: 0.63 10*3/MM3 (ref 0.1–0.9)
MONOCYTES NFR BLD AUTO: 8.4 % (ref 5–12)
NEUTROPHILS # BLD AUTO: 5.12 10*3/MM3 (ref 1.7–7)
NEUTROPHILS NFR BLD AUTO: 68.3 % (ref 42.7–76)
NITRITE UR QL STRIP: NEGATIVE
PH UR STRIP.AUTO: 7 [PH] (ref 5–8)
PLATELET # BLD AUTO: 294 10*3/MM3 (ref 140–450)
PMV BLD AUTO: 10.4 FL (ref 6–12)
POTASSIUM BLD-SCNC: 4.2 MMOL/L (ref 3.5–5.2)
PROT SERPL-MCNC: 7.5 G/DL (ref 6–8.5)
PROT UR QL STRIP: NEGATIVE
RBC # BLD AUTO: 4.83 10*6/MM3 (ref 3.77–5.28)
SODIUM BLD-SCNC: 140 MMOL/L (ref 136–145)
SP GR UR STRIP: 1.02 (ref 1–1.03)
TRIGL SERPL-MCNC: 110 MG/DL (ref 0–150)
TSH SERPL DL<=0.05 MIU/L-ACNC: 0.97 MIU/ML (ref 0.27–4.2)
UROBILINOGEN UR QL STRIP: NORMAL
VLDLC SERPL-MCNC: 22 MG/DL (ref 5–40)
WBC NRBC COR # BLD: 7.5 10*3/MM3 (ref 3.4–10.8)

## 2019-08-16 PROCEDURE — 85025 COMPLETE CBC W/AUTO DIFF WBC: CPT | Performed by: INTERNAL MEDICINE

## 2019-08-16 PROCEDURE — 80053 COMPREHEN METABOLIC PANEL: CPT | Performed by: INTERNAL MEDICINE

## 2019-08-16 PROCEDURE — 84443 ASSAY THYROID STIM HORMONE: CPT | Performed by: INTERNAL MEDICINE

## 2019-08-16 PROCEDURE — 81003 URINALYSIS AUTO W/O SCOPE: CPT | Performed by: INTERNAL MEDICINE

## 2019-08-16 PROCEDURE — 36415 COLL VENOUS BLD VENIPUNCTURE: CPT | Performed by: INTERNAL MEDICINE

## 2019-08-16 PROCEDURE — 80061 LIPID PANEL: CPT | Performed by: INTERNAL MEDICINE

## 2019-08-16 PROCEDURE — 83036 HEMOGLOBIN GLYCOSYLATED A1C: CPT | Performed by: INTERNAL MEDICINE

## 2019-08-23 ENCOUNTER — OFFICE VISIT (OUTPATIENT)
Dept: INTERNAL MEDICINE | Facility: CLINIC | Age: 69
End: 2019-08-23

## 2019-08-23 VITALS
BODY MASS INDEX: 27.48 KG/M2 | SYSTOLIC BLOOD PRESSURE: 104 MMHG | HEIGHT: 66 IN | RESPIRATION RATE: 14 BRPM | WEIGHT: 171 LBS | DIASTOLIC BLOOD PRESSURE: 70 MMHG

## 2019-08-23 DIAGNOSIS — E11.9 TYPE 2 DIABETES MELLITUS WITHOUT COMPLICATION, WITHOUT LONG-TERM CURRENT USE OF INSULIN (HCC): ICD-10-CM

## 2019-08-23 DIAGNOSIS — K76.0 STEATOSIS OF LIVER: ICD-10-CM

## 2019-08-23 DIAGNOSIS — E78.00 HYPERCHOLESTEROLEMIA: ICD-10-CM

## 2019-08-23 DIAGNOSIS — Z00.00 HEALTH CARE MAINTENANCE: Primary | ICD-10-CM

## 2019-08-23 DIAGNOSIS — I70.0 AORTIC ATHEROSCLEROSIS (HCC): ICD-10-CM

## 2019-08-23 DIAGNOSIS — I10 BENIGN ESSENTIAL HYPERTENSION: ICD-10-CM

## 2019-08-23 DIAGNOSIS — Z12.31 VISIT FOR SCREENING MAMMOGRAM: ICD-10-CM

## 2019-08-23 DIAGNOSIS — F41.9 ANXIETY: ICD-10-CM

## 2019-08-23 PROBLEM — G44.52 NEW DAILY PERSISTENT HEADACHE: Status: RESOLVED | Noted: 2017-03-09 | Resolved: 2019-08-23

## 2019-08-23 PROCEDURE — 99214 OFFICE O/P EST MOD 30 MIN: CPT | Performed by: INTERNAL MEDICINE

## 2019-08-23 PROCEDURE — G0439 PPPS, SUBSEQ VISIT: HCPCS | Performed by: INTERNAL MEDICINE

## 2019-08-23 NOTE — PATIENT INSTRUCTIONS
"Annual wellness visit with preventive exam as well as age and risk appropriate councelling completed.    Cardiovascular disease councelling: current. Recommended: Needs better blood sugars control., Needs better cholesterol control. Target LDL below 70.  Diabetes screening and councelling: current. Recommended: Low carb diet recommended. Needs to avoid starches and sweets., Regular aerobic exercise recommended., Weight loss recommended.  Breast cancer screening: is due. Will schedule..  Osteoporosis screening: up to date. Recommended every 5 years. Next screening is due in 2024..  Glaucoma screening: up to date.   AAA screening: not needed..  Hep C screening (born between 1821-2961) completed..  Colorectal cancer screening: up to date. Recommended every 10 years. Next screening is due in 2021..    Immunizations:   1. Influenza vaccine  is up to date and recommended yearly.   2. Pneumococcal vaccines: completed.   3. Shingles prevention:  Zostavax completed, recommended to get new shingles vaccine Shingrix at the pharmacy, benefits explained.      Advanced directives planning: has Living Will. Discussed. I agree with patient wishes and decision..    Medications reviewed and medication list updated.   Potential harmful drug-disease interactions in the elderly:none.  High risk medication in elderly: none  ASA use: no indications.    HTN - well controlled with current medication regimen. Normal kidney tests and electrolytes. Recommended low salt diet. Continue same medical treatment.  Hyperlipidemia - not well contrlled with statin. Normal liver function tests. LDL target is below < 70 mg/dl (\"very high\" risk for CHD). Patient's 98. Continue same treatment. Regular exercise recommended.  Newly diagnosed diabetes mellitus type II.   Lab Results   Component Value Date    HGBA1C 5.70 (H) 08/16/2019   Her problem is high fasting blood sugars.  Low carb diet and regular exercise recommended. Weight loss will be very " beneficial. Regular feet self examinations and comfortable footwear recommended. Patient has yearly dilated eye exams. Will check microalbumin in the urine. No peripheral neuropathy on feet exam with microfilament. Continue statin and ARB.   Will get glucometer and will have patient to start checking her blood sugars at home. Will schedule with NP in the office for teaching.  Anxiety - mood is well controlled with Duloxetine, continue same.  Fatty liver - nl LFTs, needs to stay on strict low carb diet.

## 2019-08-23 NOTE — PROGRESS NOTES
"Shelby Page is a 68 y.o. female.     History of Present Illness   /70 (BP Location: Left arm, Patient Position: Sitting, Cuff Size: Adult)   Resp 14   Ht 167.6 cm (66\")   Wt 77.6 kg (171 lb)   BMI 27.60 kg/m²   Patient is being seen for subsequent wellness visit.  Hospitalizations in a past: only for childbirth  Once per lifetime Medicare screening tests: ECG - had stress test 03/2017    AAA risk assessment: 1. FH: none. 2.H/o tobacco smoking: in remote past, as per . 3. CV or PAD: as per medical problems list.    Tobacco use: in remote past, as per    Alcohol use: 4-5 days a week  Illicit drugs use: none  Diet: low carb  Exercise: 2 times a week and yoga    Anxiety screening: How many days in the last 2 weeks you were  1. Feeling anxious, nervous, on edge: none  2. Unable to stop worrying: none  3. Worrying too much about different things: none  4. Having problems relaxing:none  5. Feeling restless or unable to sit still:none  6. Feeling irritable or easely annoyed: none  7. Being afraid that something awful might happen: none    Patient is compliant with daily use of Duloxetine, and her mood is stable.    Depression screening PHQ9:  Over the past 2 weeks how often have you been bothered by  1. Lack of interest or pleasuer in usual activities: none  2. Feeling down, depressed, hopeless:none  3. Troubles falling asleep/sleeping too long:none  4. Feeling tired, having little energy: none  5. Poor appetite or overeating: none  6. Feeling bad about yourself:none.  7. Trouble concentrating: at the baseline.  8. Moving or speaking too slow or much faster than usual: none  9. Thoughts about harming yourself:none.    Cognitive impairment screening:   Do you have difficulties with:  1. Language: no  2. Behavior: no  3. Learning/retaining new information: no  4. Handling complex tasks: no  5. Reasoning: no  6. Spacial ability and orientation: no    Hearing: normal.  Driving: " "unrestricted  ADL: independent  ADL details: Does patient needs help with:  telephone use: no  Transportation: no  Housework: no  Shopping: no  meal preparation: no  laundry: no  managing medication:no  Participate in the social activities: Y    Fall risk factors:   1.Use of alcohol: yes    2.Polypharmacy: yes  3.H/o of previous fall:  yes  4. Mobility impairment:  no  5. Visual impairment:  no  6. Deconditioning: no  7. Use of antihypertensive medication:  yes  8. Use of sedatives: no  9. Use of antidepressant: yes    Home safety:   1.loose rugs: no   2.unfamiliar environment: no   3. Uneven floors: no   4. No grab bars in the bathroom: no  5. No banister on stairs: no      Advanced directives: has Living Will. Discussed. I agree with patient wishes and decision..    Co-managers: ophthalmology , dermatology , ortho , general surgeon       /70 (BP Location: Left arm, Patient Position: Sitting, Cuff Size: Adult)   Resp 14   Ht 167.6 cm (66\")   Wt 77.6 kg (171 lb)   BMI 27.60 kg/m²     Current Outpatient Medications:   •  amLODIPine (NORVASC) 5 MG tablet, Take 1 tablet by mouth Daily., Disp: 90 tablet, Rfl: 1  •  aspirin 81 MG EC tablet, Take 81 mg by mouth Daily., Disp: , Rfl:   •  atorvastatin (LIPITOR) 20 MG tablet, TAKE ONE TABLET BY MOUTH DAILY, Disp: 90 tablet, Rfl: 1  •  Cholecalciferol (VITAMIN D) 1000 UNITS tablet, Take  by mouth., Disp: , Rfl:   •  ciprofloxacin (CIPRO) 500 MG tablet, Take 1 tablet by mouth 2 (Two) Times a Day., Disp: 14 tablet, Rfl: 0  •  Coenzyme Q10 (CO Q 10) 10 MG capsule, Take  by mouth Daily., Disp: , Rfl:   •  diclofenac sodium (VOTAREN XR) 100 MG 24 hr tablet, TAKE ONE TABLET BY MOUTH DAILY, Disp: 30 tablet, Rfl: 4  •  DULoxetine (CYMBALTA) 60 MG capsule, TAKE ONE CAPSULE BY MOUTH DAILY, Disp: 90 capsule, Rfl: 0  •  fluticasone (FLONASE) 50 MCG/ACT nasal spray, 2 sprays into each nostril daily. Administer 2 sprays in each nostril for each " "dose., Disp: , Rfl:   •  Multiple Vitamin (MULTIVITAMIN) capsule, Take 1 capsule by mouth Daily., Disp: , Rfl:   •  olmesartan (BENICAR) 40 MG tablet, TAKE ONE TABLET BY MOUTH DAILY, Disp: 30 tablet, Rfl: 9  •  olmesartan (BENICAR) 40 MG tablet, TAKE ONE TABLET BY MOUTH DAILY, Disp: 30 tablet, Rfl: 9  •  Probiotic Product (PRO-BIOTIC BLEND) capsule, Take  by mouth Daily., Disp: , Rfl:   •  propranolol (INDERAL) 60 MG tablet, TAKE ONE TABLET BY MOUTH TWICE A DAY, Disp: 60 tablet, Rfl: 10  •  psyllium (METAMUCIL) 58.6 % packet, Take 1 packet by mouth Daily., Disp: , Rfl:     Patient has long-standing benign essential HTN.  BP is usually well controlled with daily use of Ca channel blocker, b-blocker and ARB. On low salt diet with good compliance. Takes medication regularly. Denies chest pain, dyspnea,lightheadedness,  lower extremity edema. Patient does not check blood pressure    Patient has been diagnosed with hyperlipidemia. Target LDL is < 70 mg/dl (\"very high\" risk for CHD). Patient is on low fat diet with good compliance. Patient is compliant with treatment: daily use of Lipitor (atorvastatin). Side effects of medication: none. Exercise 2 times a week and yoga.    Patient has elevated fasting blood sugars for the last year. Jana Page uses diet and exercise for blood sugars control. Patient does not check home blood sugars.. Compliance with low carb diabetic diet is fair.                                     The following portions of the patient's history were reviewed and updated as appropriate: allergies, current medications, past family history, past medical history, past social history, past surgical history and problem list.    Review of Systems   Constitutional: Negative for chills and fever.   HENT: Negative for postnasal drip, sinus pressure and sore throat.    Eyes: Negative for pain and itching.   Respiratory: Negative for cough and chest tightness.    Cardiovascular: Negative for chest pain and " leg swelling.   Gastrointestinal: Positive for abdominal pain. Negative for blood in stool.   Endocrine: Negative for cold intolerance and heat intolerance.   Genitourinary: Negative for difficulty urinating and flank pain.   Musculoskeletal: Negative for back pain and neck pain.   Skin: Negative for color change and rash.   Neurological: Negative for dizziness, weakness and headaches.   Hematological: Negative for adenopathy. Does not bruise/bleed easily.   Psychiatric/Behavioral: Negative for sleep disturbance. The patient is not nervous/anxious.        Objective   Physical Exam   Constitutional: She is oriented to person, place, and time. She appears well-developed and well-nourished. No distress.   Central weight distribution   HENT:   Head: Normocephalic and atraumatic.   Right Ear: Tympanic membrane, external ear and ear canal normal. No drainage or tenderness. No mastoid tenderness. Tympanic membrane is not injected. No middle ear effusion.   Left Ear: Tympanic membrane, external ear and ear canal normal. No drainage or tenderness. No mastoid tenderness. Tympanic membrane is not injected.  No middle ear effusion.   Nose: Nose normal. No sinus tenderness. Right sinus exhibits no maxillary sinus tenderness and no frontal sinus tenderness. Left sinus exhibits no maxillary sinus tenderness and no frontal sinus tenderness.   Mouth/Throat: Oropharynx is clear and moist. No oropharyngeal exudate.   Eyes: Conjunctivae are normal. Pupils are equal, round, and reactive to light. Right eye exhibits no discharge. Left eye exhibits no discharge. No scleral icterus. Left eye exhibits abnormal extraocular motion.   Medial strobismus left eye   Neck: Normal range of motion and full passive range of motion without pain. Neck supple. No JVD present. Carotid bruit is not present. No thyromegaly present.   Cardiovascular: Normal rate, regular rhythm, S1 normal, S2 normal, normal heart sounds and intact distal pulses. Exam  reveals no gallop and no friction rub.   No murmur heard.  Pulmonary/Chest: Effort normal and breath sounds normal. No respiratory distress. She has no wheezes. She has no rales. She exhibits no tenderness.   Abdominal: Soft. Bowel sounds are normal. She exhibits no distension and no mass. There is no tenderness. There is no rebound and no guarding.   Musculoskeletal: Normal range of motion. She exhibits no edema.    Jana had a diabetic foot exam performed today.   During the foot exam she had a monofilament test performed (light touch intact over both feet on exam with microfilament).    Neurological Sensory Findings - Unaltered hot/cold right ankle/foot discrimination and unaltered hot/cold left ankle/foot discrimination. Unaltered sharp/dull right ankle/foot discrimination and unaltered sharp/dull left ankle/foot discrimination. No right ankle/foot altered proprioception and no left ankle/foot altered proprioception  Vascular Status -  Her right foot exhibits normal foot vasculature  and no edema. Her left foot exhibits normal foot vasculature  and no edema.  Skin Integrity  -  Her right foot skin is intact.Her left foot skin is intact..  Lymphadenopathy:        Head (right side): No submental, no submandibular, no preauricular, no posterior auricular and no occipital adenopathy present.        Head (left side): No submental, no submandibular, no tonsillar, no preauricular, no posterior auricular and no occipital adenopathy present.     She has no cervical adenopathy.        Right cervical: No superficial cervical, no deep cervical and no posterior cervical adenopathy present.       Left cervical: No superficial cervical, no deep cervical and no posterior cervical adenopathy present.        Right: No supraclavicular adenopathy present.        Left: No supraclavicular adenopathy present.   Neurological: She is alert and oriented to person, place, and time. She has normal reflexes. She displays normal reflexes. No  cranial nerve deficit. She exhibits normal muscle tone. Coordination normal.   Reflex Scores:       Bicep reflexes are 2+ on the right side and 2+ on the left side.       Patellar reflexes are 2+ on the right side and 2+ on the left side.  Skin: Skin is warm. No rash noted. She is not diaphoretic. No erythema.   Psychiatric: She has a normal mood and affect. Her behavior is normal. Thought content normal.   Vitals reviewed.      Assessment/Plan   Jana was seen today for medicare wellness-subsequent, hypertension, hyperlipidemia and prediabetes.    Diagnoses and all orders for this visit:    Health care maintenance    Benign essential hypertension    Hypercholesterolemia    Aortic atherosclerosis (CMS/HCC)    Steatosis of liver    Anxiety    Visit for screening mammogram  -     Mammo Screening Digital Tomosynthesis Bilateral With CAD; Future    Type 2 diabetes mellitus without complication, without long-term current use of insulin (CMS/[facility])  -     Hemoglobin A1c; Future  -     Comprehensive Metabolic Panel; Future        Annual wellness visit with preventive exam as well as age and risk appropriate councelling completed.    Cardiovascular disease councelling: current. Recommended: Needs better blood sugars control., Needs better cholesterol control. Target LDL below 70.  Diabetes screening and councelling: current. Recommended: Low carb diet recommended. Needs to avoid starches and sweets., Regular aerobic exercise recommended., Weight loss recommended.  Breast cancer screening: is due. Will schedule..  Osteoporosis screening: up to date. Recommended every 5 years. Next screening is due in 2024..  Glaucoma screening: up to date.   AAA screening: not needed..  Hep C screening (born between [birth year]) completed..  Colorectal cancer screening: up to date. Recommended every 10 years. Next screening is due in 2021..    Immunizations:   1. Influenza vaccine  is up to date and recommended yearly.   2. Pneumococcal  "vaccines: completed.   3. Shingles prevention:  Zostavax completed, recommended to get new shingles vaccine Shingrix at the pharmacy, benefits explained.      Advanced directives planning: has Living Will. Discussed. I agree with patient wishes and decision..    Medications reviewed and medication list updated.   Potential harmful drug-disease interactions in the elderly:none.  High risk medication in elderly: none  ASA use: no indications.    HTN - well controlled with current medication regimen. Normal kidney tests and electrolytes. Recommended low salt diet. Continue same medical treatment.  Hyperlipidemia - not well contrlled with statin. Normal liver function tests. LDL target is below < 70 mg/dl (\"very high\" risk for CHD). Patient's 98. Continue same treatment. Regular exercise recommended.  Newly diagnosed diabetes mellitus type II.   Lab Results   Component Value Date    HGBA1C 5.70 (H) 08/16/2019   Her problem is high fasting blood sugars.  Low carb diet and regular exercise recommended. Weight loss will be very beneficial. Regular feet self examinations and comfortable footwear recommended. Patient has yearly dilated eye exams. Will check microalbumin in the urine. No peripheral neuropathy on feet exam with microfilament. Continue statin and ARB.   Will get glucometer and will have patient to start checking her blood sugars at home. Will schedule with NP in the office for teaching.  Anxiety - mood is well controlled with Duloxetine, continue same.Fatty liver - nl LFTs, needs to stay on strict low carb diet.       "

## 2019-08-30 ENCOUNTER — OFFICE VISIT (OUTPATIENT)
Dept: INTERNAL MEDICINE | Facility: CLINIC | Age: 69
End: 2019-08-30

## 2019-08-30 VITALS
WEIGHT: 171 LBS | SYSTOLIC BLOOD PRESSURE: 120 MMHG | HEIGHT: 66 IN | DIASTOLIC BLOOD PRESSURE: 78 MMHG | HEART RATE: 78 BPM | BODY MASS INDEX: 27.48 KG/M2 | OXYGEN SATURATION: 98 %

## 2019-08-30 DIAGNOSIS — E74.39 GLUCOSE INTOLERANCE: Primary | ICD-10-CM

## 2019-08-30 DIAGNOSIS — E88.81 METABOLIC SYNDROME: ICD-10-CM

## 2019-08-30 PROCEDURE — 99213 OFFICE O/P EST LOW 20 MIN: CPT | Performed by: NURSE PRACTITIONER

## 2019-08-30 NOTE — PROGRESS NOTES
Subjective   Jana Page is a 68 y.o. female who presents for f/u regarding elevated glucose and glucose intolerance.    Her recent labs showed an elevated A1c of 5.7, she has a longstanding history of glucose intolerance. She has received a glucometer which she has brought with her, has been unable to use.  She tries to follow a low-carb, low-sugar diet but admits to not following this consistently.         The following portions of the patient's history were reviewed and updated as appropriate: allergies, current medications, past social history and problem list.    Past Medical History:   Diagnosis Date   • Anxiety    • Aortic atherosclerosis (CMS/HCC)    • DDD (degenerative disc disease), cervical    • Diverticulitis of large intestine    • Diverticulosis of intestine    • Fibrocystic breast changes    • Hypercholesterolemia    • Hyperlipidemia    • Hypertension    • Impaired fasting blood sugar    • Lateral epicondylitis of right elbow    • Lightheadedness 2/10/2017   • Lipoma of left lower extremity    • New daily persistent headache 3/9/2017   • Osteoarthritis of knee    • Osteopenia    • Pancreatic cyst     NO CHANGE IN 2012 FROM 2009   • Post-menopausal    • Scoliosis    • Steatosis of liver    • Uterine leiomyoma          Current Outpatient Medications:   •  amLODIPine (NORVASC) 5 MG tablet, Take 1 tablet by mouth Daily., Disp: 90 tablet, Rfl: 1  •  aspirin 81 MG EC tablet, Take 81 mg by mouth Daily., Disp: , Rfl:   •  atorvastatin (LIPITOR) 20 MG tablet, TAKE ONE TABLET BY MOUTH DAILY, Disp: 90 tablet, Rfl: 1  •  Cholecalciferol (VITAMIN D) 1000 UNITS tablet, Take  by mouth., Disp: , Rfl:   •  Coenzyme Q10 (CO Q 10) 10 MG capsule, Take  by mouth Daily., Disp: , Rfl:   •  diclofenac sodium (VOTAREN XR) 100 MG 24 hr tablet, TAKE ONE TABLET BY MOUTH DAILY, Disp: 30 tablet, Rfl: 4  •  DULoxetine (CYMBALTA) 60 MG capsule, TAKE ONE CAPSULE BY MOUTH DAILY, Disp: 90 capsule, Rfl: 0  •  fluticasone (FLONASE)  "50 MCG/ACT nasal spray, 2 sprays into each nostril daily. Administer 2 sprays in each nostril for each dose., Disp: , Rfl:   •  Multiple Vitamin (MULTIVITAMIN) capsule, Take 1 capsule by mouth Daily., Disp: , Rfl:   •  olmesartan (BENICAR) 40 MG tablet, TAKE ONE TABLET BY MOUTH DAILY, Disp: 30 tablet, Rfl: 9  •  Probiotic Product (PRO-BIOTIC BLEND) capsule, Take  by mouth Daily., Disp: , Rfl:   •  propranolol (INDERAL) 60 MG tablet, TAKE ONE TABLET BY MOUTH TWICE A DAY, Disp: 60 tablet, Rfl: 10  •  psyllium (METAMUCIL) 58.6 % packet, Take 1 packet by mouth Daily., Disp: , Rfl:     Allergies   Allergen Reactions   • Amoxicillin-Pot Clavulanate        Review of Systems   Constitutional: Negative for chills, fatigue, fever and unexpected weight change.   HENT: Negative for congestion, ear pain, postnasal drip, sinus pressure, sore throat and trouble swallowing.    Eyes: Negative for visual disturbance.   Respiratory: Negative for cough, chest tightness and wheezing.    Cardiovascular: Negative for chest pain, palpitations and leg swelling.   Gastrointestinal: Negative for abdominal pain.   Genitourinary: Negative for dysuria.   Neurological: Negative for syncope, weakness and headaches.   Hematological: Does not bruise/bleed easily.       Objective   Vitals:    08/30/19 1309   BP: 120/78   BP Location: Left arm   Patient Position: Sitting   Cuff Size: Adult   Pulse: 78   SpO2: 98%   Weight: 77.6 kg (171 lb)   Height: 167.6 cm (66\")     Physical Exam   Constitutional: She appears well-developed and well-nourished. She is cooperative. She does not have a sickly appearance. She does not appear ill.   HENT:   Head: Normocephalic.   Right Ear: Hearing, tympanic membrane and external ear normal.   Left Ear: Hearing, tympanic membrane and external ear normal.   Nose: Nose normal. No mucosal edema, rhinorrhea, sinus tenderness or nasal deformity. Right sinus exhibits no maxillary sinus tenderness and no frontal sinus " tenderness. Left sinus exhibits no maxillary sinus tenderness and no frontal sinus tenderness.   Mouth/Throat: Oropharynx is clear and moist and mucous membranes are normal. Normal dentition.   Eyes: Conjunctivae and lids are normal. Right eye exhibits no discharge and no exudate. Left eye exhibits no discharge and no exudate.   Neck: Trachea normal. Carotid bruit is not present. No edema present. No thyroid mass present.   Cardiovascular: Regular rhythm, normal heart sounds and normal pulses.   No murmur heard.  Pulmonary/Chest: Breath sounds normal. No respiratory distress. She has no decreased breath sounds. She has no wheezes. She has no rhonchi. She has no rales.   Lymphadenopathy:        Head (right side): No submental, no submandibular, no tonsillar, no preauricular, no posterior auricular and no occipital adenopathy present.        Head (left side): No submental, no submandibular, no tonsillar, no preauricular, no posterior auricular and no occipital adenopathy present.   Neurological: She is alert.   Skin: Skin is warm, dry and intact. No cyanosis. Nails show no clubbing.       Assessment/Plan   Jana was seen today for discuss high glucose.    Diagnoses and all orders for this visit:    Glucose intolerance    Metabolic syndrome    Discussed low-carb, low-sugar diet with goal of 40% of calories coming from carbohydrates. We discussed goal of 150-175 gm/carb per day, folder with carb content and dietary information given to patient as well.  Demonstrated proper use of AccuChek Loretta glucometer and home monitoring, discussed glucose goals.  She is scheduled for f/u with Dr. Newberry for repeat A1c as well.

## 2019-09-09 ENCOUNTER — TELEPHONE (OUTPATIENT)
Dept: INTERNAL MEDICINE | Facility: CLINIC | Age: 69
End: 2019-09-09

## 2019-09-09 NOTE — TELEPHONE ENCOUNTER
----- Message from More Giron sent at 9/9/2019  1:19 PM EDT -----  Contact: Pt   Pt is calling asking this message go to Lynn as PCP is out and she was seen by her recently.    Pt is having high blood sugar, started taking this at home.  This morning was 167. 10 am   And noon 131.  Is it normal to have high BS int he morning?    Pt# [phone]

## 2019-09-09 NOTE — TELEPHONE ENCOUNTER
Discussed with patient-advised to check 2 hour pp glucose as well. Continue with am glucose monitoring. Discussed low-carb, low-sugar diet. Requests refill of lancets.

## 2019-09-11 RX ORDER — LANCETS
EACH MISCELLANEOUS
Qty: 100 EACH | Refills: 2 | Status: SHIPPED | OUTPATIENT
Start: 2019-09-11 | End: 2021-10-04 | Stop reason: SDUPTHER

## 2019-09-11 NOTE — TELEPHONE ENCOUNTER
----- Message from Lynn Montana sent at 9/11/2019  8:49 AM EDT -----  Pt request message to Lynn-Pt is testing her BS and needs lancets. She is using  Accucheck Loretta Plus unit.     Please send Rx to pharmacy.    [facility] [address] - [address] AT [facility] [address]  - [phone]  - [phone] FX    Pt# [phone]

## 2019-09-25 ENCOUNTER — APPOINTMENT (OUTPATIENT)
Dept: WOMENS IMAGING | Facility: HOSPITAL | Age: 69
End: 2019-09-25

## 2019-09-25 PROCEDURE — 77067 SCR MAMMO BI INCL CAD: CPT | Performed by: RADIOLOGY

## 2019-09-25 PROCEDURE — 77063 BREAST TOMOSYNTHESIS BI: CPT | Performed by: RADIOLOGY

## 2019-09-25 PROCEDURE — MDREVIEWSP: Performed by: RADIOLOGY

## 2019-09-27 DIAGNOSIS — Z12.31 VISIT FOR SCREENING MAMMOGRAM: ICD-10-CM

## 2019-10-29 ENCOUNTER — TELEPHONE (OUTPATIENT)
Dept: INTERNAL MEDICINE | Facility: CLINIC | Age: 69
End: 2019-10-29

## 2019-10-29 NOTE — TELEPHONE ENCOUNTER
----- Message from Sandrita Potts sent at 10/29/2019 10:22 AM EDT -----  Contact: patient  [facility] and she is wondering if she has had one already.  I wasn't sure where to check to be sure.      Please call patient at : [phone]    Thank you,    Sandrita

## 2019-11-02 DIAGNOSIS — E78.00 HYPERCHOLESTEROLEMIA: ICD-10-CM

## 2019-11-04 RX ORDER — ATORVASTATIN CALCIUM 20 MG/1
TABLET, FILM COATED ORAL
Qty: 90 TABLET | Refills: 1 | Status: SHIPPED | OUTPATIENT
Start: 2019-11-04 | End: 2020-05-07

## 2019-11-06 DIAGNOSIS — R42 LIGHTHEADEDNESS: ICD-10-CM

## 2019-11-06 DIAGNOSIS — F41.9 ANXIETY: ICD-10-CM

## 2019-11-06 RX ORDER — DULOXETIN HYDROCHLORIDE 60 MG/1
CAPSULE, DELAYED RELEASE ORAL
Qty: 90 CAPSULE | Refills: 0 | Status: SHIPPED | OUTPATIENT
Start: 2019-11-06 | End: 2020-03-09

## 2019-11-19 ENCOUNTER — LAB (OUTPATIENT)
Dept: INTERNAL MEDICINE | Facility: CLINIC | Age: 69
End: 2019-11-19

## 2019-11-19 DIAGNOSIS — E11.9 TYPE 2 DIABETES MELLITUS WITHOUT COMPLICATION, WITHOUT LONG-TERM CURRENT USE OF INSULIN (HCC): ICD-10-CM

## 2019-11-19 LAB
ALBUMIN SERPL-MCNC: 5 G/DL (ref 3.5–5.2)
ALBUMIN/GLOB SERPL: 2 G/DL
ALP SERPL-CCNC: 83 U/L (ref 39–117)
ALT SERPL W P-5'-P-CCNC: 25 U/L (ref 1–33)
ANION GAP SERPL CALCULATED.3IONS-SCNC: 13 MMOL/L (ref 5–15)
AST SERPL-CCNC: 17 U/L (ref 1–32)
BILIRUB SERPL-MCNC: 0.6 MG/DL (ref 0.2–1.2)
BUN BLD-MCNC: 17 MG/DL (ref 8–23)
BUN/CREAT SERPL: 23 (ref 7–25)
CALCIUM SPEC-SCNC: 9.5 MG/DL (ref 8.6–10.5)
CHLORIDE SERPL-SCNC: 103 MMOL/L (ref 98–107)
CO2 SERPL-SCNC: 26 MMOL/L (ref 22–29)
CREAT BLD-MCNC: 0.74 MG/DL (ref 0.57–1)
GFR SERPL CREATININE-BSD FRML MDRD: 78 ML/MIN/1.73
GLOBULIN UR ELPH-MCNC: 2.5 GM/DL
GLUCOSE BLD-MCNC: 111 MG/DL (ref 65–99)
HBA1C MFR BLD: 5.6 % (ref 4.8–5.6)
POTASSIUM BLD-SCNC: 4.1 MMOL/L (ref 3.5–5.2)
PROT SERPL-MCNC: 7.5 G/DL (ref 6–8.5)
SODIUM BLD-SCNC: 142 MMOL/L (ref 136–145)

## 2019-11-19 PROCEDURE — 36415 COLL VENOUS BLD VENIPUNCTURE: CPT | Performed by: INTERNAL MEDICINE

## 2019-11-19 PROCEDURE — 83036 HEMOGLOBIN GLYCOSYLATED A1C: CPT | Performed by: INTERNAL MEDICINE

## 2019-11-19 PROCEDURE — 80053 COMPREHEN METABOLIC PANEL: CPT | Performed by: INTERNAL MEDICINE

## 2019-11-25 ENCOUNTER — OFFICE VISIT (OUTPATIENT)
Dept: INTERNAL MEDICINE | Facility: CLINIC | Age: 69
End: 2019-11-25

## 2019-11-25 VITALS
DIASTOLIC BLOOD PRESSURE: 74 MMHG | RESPIRATION RATE: 14 BRPM | BODY MASS INDEX: 27.16 KG/M2 | HEIGHT: 66 IN | SYSTOLIC BLOOD PRESSURE: 122 MMHG | WEIGHT: 169 LBS

## 2019-11-25 DIAGNOSIS — E11.9 TYPE 2 DIABETES MELLITUS WITHOUT COMPLICATION, WITHOUT LONG-TERM CURRENT USE OF INSULIN (HCC): Primary | ICD-10-CM

## 2019-11-25 DIAGNOSIS — I10 BENIGN ESSENTIAL HYPERTENSION: ICD-10-CM

## 2019-11-25 DIAGNOSIS — E78.00 HYPERCHOLESTEROLEMIA: ICD-10-CM

## 2019-11-25 PROCEDURE — 99213 OFFICE O/P EST LOW 20 MIN: CPT | Performed by: INTERNAL MEDICINE

## 2019-11-25 NOTE — PROGRESS NOTES
"Subjective   Jana Page is a 69 y.o. female.     History of Present Illness     /74 (BP Location: Left arm, Patient Position: Sitting, Cuff Size: Adult)   Resp 14   Ht 167.6 cm (66\")   Wt 76.7 kg (169 lb)   BMI 27.28 kg/m²     Current Outpatient Medications:   •  ACCU-CHEK FASTCLIX LANCETS misc, DX. Ell.9, Disp: 100 each, Rfl: 2  •  amLODIPine (NORVASC) 5 MG tablet, Take 1 tablet by mouth Daily., Disp: 90 tablet, Rfl: 1  •  aspirin 81 MG EC tablet, Take 81 mg by mouth Daily., Disp: , Rfl:   •  atorvastatin (LIPITOR) 20 MG tablet, TAKE ONE TABLET BY MOUTH DAILY, Disp: 90 tablet, Rfl: 1  •  Cholecalciferol (VITAMIN D) 1000 UNITS tablet, Take  by mouth., Disp: , Rfl:   •  Coenzyme Q10 (CO Q 10) 10 MG capsule, Take  by mouth Daily., Disp: , Rfl:   •  diclofenac sodium (VOTAREN XR) 100 MG 24 hr tablet, TAKE ONE TABLET BY MOUTH DAILY, Disp: 30 tablet, Rfl: 4  •  DULoxetine (CYMBALTA) 60 MG capsule, TAKE ONE CAPSULE BY MOUTH DAILY, Disp: 90 capsule, Rfl: 0  •  fluticasone (FLONASE) 50 MCG/ACT nasal spray, 2 sprays into each nostril daily. Administer 2 sprays in each nostril for each dose., Disp: , Rfl:   •  Multiple Vitamin (MULTIVITAMIN) capsule, Take 1 capsule by mouth Daily., Disp: , Rfl:   •  olmesartan (BENICAR) 40 MG tablet, TAKE ONE TABLET BY MOUTH DAILY, Disp: 30 tablet, Rfl: 9  •  Probiotic Product (PRO-BIOTIC BLEND) capsule, Take  by mouth Daily., Disp: , Rfl:   •  propranolol (INDERAL) 60 MG tablet, TAKE ONE TABLET BY MOUTH TWICE A DAY, Disp: 60 tablet, Rfl: 10  •  psyllium (METAMUCIL) 58.6 % packet, Take 1 packet by mouth Daily., Disp: , Rfl:     Patient has long-standing benign essential HTN.  BP is usually well controlled with daily use of Ca channel blocker, b-blocker and ARB. On low salt diet with good compliance. Takes medication regularly. Denies chest pain, dyspnea,lightheadedness,  lower extremity edema. Patient does not check blood pressure    Patient has DM type II. Jana Page" uses diet and exercise for blood sugars control. Patient checks fasting blood sugars at home daily. and Denies hypoglycemic episodes.. Compliance with low carb diabetic diet is good.  In a past control had been good. Last Hb A1C was 5.7.    The following portions of the patient's history were reviewed and updated as appropriate: allergies, current medications, past family history, past medical history, past social history, past surgical history and problem list.    Review of Systems   Constitutional: Negative for chills and fever.   Eyes: Negative for pain and redness.   Respiratory: Negative for cough and shortness of breath.    Cardiovascular: Negative for chest pain and leg swelling.   Neurological: Negative for dizziness and headaches.       Objective   Physical Exam   Constitutional: She is oriented to person, place, and time. She appears well-developed.   Central weight distribution   HENT:   Head: Normocephalic and atraumatic.   Right Ear: Tympanic membrane, external ear and ear canal normal.   Left Ear: Tympanic membrane, external ear and ear canal normal.   Nose: Nose normal. Right sinus exhibits no maxillary sinus tenderness and no frontal sinus tenderness. Left sinus exhibits no maxillary sinus tenderness and no frontal sinus tenderness.   Mouth/Throat: Uvula is midline, oropharynx is clear and moist and mucous membranes are normal.   Eyes: Conjunctivae and EOM are normal. Pupils are equal, round, and reactive to light. Right eye exhibits no discharge. Left eye exhibits no discharge. No scleral icterus.   Neck: Neck supple. No JVD present.   Cardiovascular: Normal rate, regular rhythm and normal heart sounds. Exam reveals no gallop and no friction rub.   No murmur heard.  Pulmonary/Chest: Effort normal and breath sounds normal. She has no wheezes. She has no rales.   Musculoskeletal: She exhibits no edema.   Lymphadenopathy:     She has no cervical adenopathy.   Neurological: She is alert and oriented to  person, place, and time. No cranial nerve deficit.   Skin: Skin is warm and dry. No rash noted.   Psychiatric: She has a normal mood and affect. Her behavior is normal.   Vitals reviewed.      Assessment/Plan   Jana was seen today for diabetes.    Diagnoses and all orders for this visit:    Type 2 diabetes mellitus without complication, without long-term current use of insulin (CMS/[facility])    Benign essential hypertension      HTN - well controlled with current medication regimen. Normal kidney tests and electrolytes. Recommended low salt diet. Continue same medical treatment.    DM II - well controlled with diet and exercise. Hb A1C is   Lab Results   Component Value Date    HGBA1C 5.60 11/19/2019    .  No hypoglycemic episodes. Low carb diet and regular exercise recommended. Weight loss will be very beneficial.

## 2019-12-02 ENCOUNTER — OFFICE VISIT (OUTPATIENT)
Dept: INTERNAL MEDICINE | Facility: CLINIC | Age: 69
End: 2019-12-02

## 2019-12-02 VITALS
DIASTOLIC BLOOD PRESSURE: 80 MMHG | HEART RATE: 81 BPM | TEMPERATURE: 100 F | OXYGEN SATURATION: 98 % | SYSTOLIC BLOOD PRESSURE: 118 MMHG

## 2019-12-02 DIAGNOSIS — R10.31 RIGHT LOWER QUADRANT ABDOMINAL PAIN: ICD-10-CM

## 2019-12-02 DIAGNOSIS — R68.89 FLU-LIKE SYMPTOMS: Primary | ICD-10-CM

## 2019-12-02 DIAGNOSIS — J06.9 UPPER RESPIRATORY TRACT INFECTION, UNSPECIFIED TYPE: ICD-10-CM

## 2019-12-02 DIAGNOSIS — J34.89 SINUS PRESSURE: ICD-10-CM

## 2019-12-02 LAB
ALBUMIN SERPL-MCNC: 4.9 G/DL (ref 3.5–5.2)
ALBUMIN/GLOB SERPL: 2.5 G/DL
ALP SERPL-CCNC: 85 U/L (ref 39–117)
ALT SERPL-CCNC: 24 U/L (ref 1–33)
AMORPH URATE CRY URNS QL MICRO: ABNORMAL /HPF
AST SERPL-CCNC: 19 U/L (ref 1–32)
BACTERIA UR QL AUTO: ABNORMAL /HPF
BASOPHILS # BLD AUTO: 0.03 10*3/MM3 (ref 0–0.2)
BASOPHILS NFR BLD AUTO: 0.2 % (ref 0–1.5)
BILIRUB SERPL-MCNC: 0.5 MG/DL (ref 0.2–1.2)
BILIRUB UR QL STRIP: NEGATIVE
BUN SERPL-MCNC: 8 MG/DL (ref 8–23)
BUN/CREAT SERPL: 10.1 (ref 7–25)
CALCIUM SERPL-MCNC: 9.9 MG/DL (ref 8.6–10.5)
CHLORIDE SERPL-SCNC: 101 MMOL/L (ref 98–107)
CLARITY UR: CLEAR
CO2 SERPL-SCNC: 28.3 MMOL/L (ref 22–29)
COLOR UR: YELLOW
CREAT SERPL-MCNC: 0.79 MG/DL (ref 0.57–1)
EOSINOPHIL # BLD AUTO: 0.11 10*3/MM3 (ref 0–0.4)
EOSINOPHIL # BLD AUTO: 0.8 % (ref 0.3–6.2)
ERYTHROCYTE [DISTWIDTH] IN BLOOD BY AUTOMATED COUNT: 12.4 % (ref 12.3–15.4)
EXPIRATION DATE: NORMAL
FLUAV AG NPH QL: NEGATIVE
FLUBV AG NPH QL: NEGATIVE
GLOBULIN SER CALC-MCNC: 2 GM/DL
GLUCOSE SERPL-MCNC: 88 MG/DL (ref 65–99)
GLUCOSE UR STRIP-MCNC: NEGATIVE MG/DL
HCT VFR BLD AUTO: 42.3 % (ref 34–46.6)
HGB BLD-MCNC: 14.5 G/DL (ref 12–15.9)
HGB UR QL STRIP.AUTO: NEGATIVE
HYALINE CASTS UR QL AUTO: ABNORMAL /LPF
IMM GRANULOCYTES # BLD: 0.04 10*3/MM3 (ref 0–0.05)
IMM GRANULOCYTES NFR BLD: 0.3 % (ref 0–0.5)
INTERNAL CONTROL: NORMAL
KETONES UR QL STRIP: NEGATIVE
LEUKOCYTE ESTERASE UR QL STRIP.AUTO: NEGATIVE
LYMPHOCYTES # BLD AUTO: 0.95 10*3/MM3 (ref 0.7–3.1)
LYMPHOCYTES NFR BLD AUTO: 7.3 % (ref 19.6–45.3)
Lab: NORMAL
MCH RBC QN AUTO: 30.3 PG (ref 26.6–33)
MCHC RBC AUTO-ENTMCNC: 34.3 G/DL (ref 31.5–35.7)
MCV RBC AUTO: 88.3 FL (ref 79–97)
MONOCYTES # BLD AUTO: 0.72 10*3/MM3 (ref 0.1–0.9)
MONOCYTES NFR BLD AUTO: 5.6 % (ref 5–12)
MUCOUS THREADS URNS QL MICRO: ABNORMAL /HPF
NEUTROPHILS # BLD AUTO: 11.1 10*3/MM3 (ref 1.7–7)
NEUTROPHILS NFR BLD AUTO: 85.8 % (ref 42.7–76)
NITRITE UR QL STRIP: NEGATIVE
NRBC BLD AUTO-RTO: 0 /100 WBC (ref 0–0.2)
PH UR STRIP.AUTO: 7 [PH] (ref 5–8)
PLATELET # BLD AUTO: 276 10*3/MM3 (ref 140–450)
POTASSIUM SERPL-SCNC: 3.9 MMOL/L (ref 3.5–5.2)
PROT SERPL-MCNC: 6.9 G/DL (ref 6–8.5)
PROT UR QL STRIP: ABNORMAL
RBC # BLD AUTO: 4.79 10*6/MM3 (ref 3.77–5.28)
RBC # UR: ABNORMAL /HPF
REF LAB TEST METHOD: ABNORMAL
SODIUM SERPL-SCNC: 142 MMOL/L (ref 136–145)
SP GR UR STRIP: 1.01 (ref 1–1.03)
SQUAMOUS #/AREA URNS HPF: ABNORMAL /HPF
UROBILINOGEN UR QL STRIP: ABNORMAL
WBC # BLD AUTO: 12.95 10*3/MM3 (ref 3.4–10.8)
WBC UR QL AUTO: ABNORMAL /HPF

## 2019-12-02 PROCEDURE — 81001 URINALYSIS AUTO W/SCOPE: CPT | Performed by: NURSE PRACTITIONER

## 2019-12-02 PROCEDURE — 87804 INFLUENZA ASSAY W/OPTIC: CPT | Performed by: NURSE PRACTITIONER

## 2019-12-02 PROCEDURE — 99213 OFFICE O/P EST LOW 20 MIN: CPT | Performed by: NURSE PRACTITIONER

## 2019-12-02 RX ORDER — BENZONATATE 200 MG/1
200 CAPSULE ORAL 3 TIMES DAILY PRN
Qty: 30 CAPSULE | Refills: 0 | Status: SHIPPED | OUTPATIENT
Start: 2019-12-02 | End: 2020-03-04

## 2019-12-02 NOTE — PROGRESS NOTES
Subjective     Jana Page is a 69 y.o. female.         Patient presents with:  Sinus Problem  Abdominal pain-RLQ    PMH of diverticulitis, she has had pain to RLQ with diverticulitis.      Sinus Problem   This is a new problem. The current episode started in the past 7 days. The problem has been gradually worsening since onset. Maximum temperature: 100. Associated symptoms include congestion, coughing (mild), headaches and sinus pressure. Pertinent negatives include no chills, ear pain, shortness of breath, sneezing or sore throat. (RLQ burning pain-mild, diarrhea)        The following portions of the patient's history were reviewed and updated as appropriate: allergies, current medications, past social history and problem list.    Review of Systems   Constitutional: Positive for fatigue. Negative for chills.   HENT: Positive for congestion and sinus pressure. Negative for ear pain, postnasal drip, sinus pain, sneezing and sore throat.    Respiratory: Positive for cough (mild). Negative for shortness of breath.    Cardiovascular: Negative for chest pain and palpitations.   Gastrointestinal: Positive for abdominal pain (RLQ), diarrhea and nausea. Negative for constipation and vomiting.   Musculoskeletal: Positive for myalgias.   Neurological: Positive for light-headedness and headaches. Negative for dizziness.       Objective     /80 (BP Location: Left arm, Patient Position: Sitting, Cuff Size: Adult)   Pulse 81   Temp 100 °F (37.8 °C)   SpO2 98%     Physical Exam   Constitutional: She appears well-developed and well-nourished. No distress.   HENT:   Head: Normocephalic and atraumatic.   Nose: No mucosal edema or rhinorrhea. Right sinus exhibits no maxillary sinus tenderness and no frontal sinus tenderness. Left sinus exhibits no maxillary sinus tenderness and no frontal sinus tenderness.   Mouth/Throat: Uvula is midline and mucous membranes are normal. No oropharyngeal exudate or posterior  oropharyngeal erythema (clear hypersecretions noted). No tonsillar exudate.   Eyes: Conjunctivae are normal. Right eye exhibits no discharge. Left eye exhibits no discharge.   Cardiovascular: Normal rate, regular rhythm and normal heart sounds.   No murmur heard.  Pulmonary/Chest: Effort normal and breath sounds normal. No tachypnea. She has no wheezes.   Abdominal: Normal appearance and bowel sounds are normal. There is tenderness in the right lower quadrant and left lower quadrant. There is no rigidity, no rebound and no guarding.   More tenderness to RLQ than LLQ   Lymphadenopathy:        Head (right side): No submental, no submandibular and no tonsillar adenopathy present.        Head (left side): No submental, no submandibular and no tonsillar adenopathy present.   Neurological: She is alert.   Skin: She is not diaphoretic.   Psychiatric: She has a normal mood and affect.   Vitals reviewed.      Assessment/Plan     Jana was seen today for sinus problem.    Diagnoses and all orders for this visit:    Flu-like symptoms  -     POC Influenza A / B    Sinus pressure    Right lower quadrant abdominal pain  -     CBC Auto Differential  -     Comprehensive Metabolic Panel  -     Urinalysis With Microscopic If Indicated (No Culture) - Urine, Clean Catch  -     Urinalysis, Microscopic Only - Urine, Clean Catch; Future  -     Urinalysis, Microscopic Only - Urine, Clean Catch    Upper respiratory tract infection, unspecified type  -     benzonatate (TESSALON) 200 MG capsule; Take 1 capsule by mouth 3 (Three) Times a Day As Needed for Cough.    Flu POCT was negative.     She will get back to taking Flonase daily and add Zyrtec to regimen.    She will eat a bland diet even though her pain and diarrhea are mild and don't seem to be r/t food.    She will call the office to give and update on if her sx are improving. We may need to treat for diverticulitis is abdominal sx are worsening.    Return for worsening of sx.

## 2019-12-02 NOTE — PATIENT INSTRUCTIONS
Continue Flonase daily.  Add Zyrtec daily.    Monitor abdominal pain and sinus pressure and call office if worsening.

## 2019-12-03 ENCOUNTER — TELEPHONE (OUTPATIENT)
Dept: INTERNAL MEDICINE | Facility: CLINIC | Age: 69
End: 2019-12-03

## 2019-12-03 NOTE — TELEPHONE ENCOUNTER
Wanted to return Kathy Zamarripa phone call- she sais that she is feeling a bit better, not 100% but much better, and tht if she could call back wit her lab results

## 2019-12-03 NOTE — TELEPHONE ENCOUNTER
Called pt back and LVM detailing lab results-labs mostly normal with exception of slightly elevated WBC. She did report in a return message to the office that she was feeling a bit better. She was educated to call back if sx worsen.

## 2019-12-05 DIAGNOSIS — J06.9 UPPER RESPIRATORY TRACT INFECTION, UNSPECIFIED TYPE: Primary | ICD-10-CM

## 2019-12-05 RX ORDER — AZITHROMYCIN 250 MG/1
TABLET, FILM COATED ORAL
Qty: 6 TABLET | Refills: 0 | Status: SHIPPED | OUTPATIENT
Start: 2019-12-05 | End: 2020-03-04

## 2019-12-05 NOTE — TELEPHONE ENCOUNTER
Pt wanted Kathy to  Know that she is feeling some what better however she is not able to blow her nose nothing comes out,  her throat is hurting, and she is coughing up phlegm. Pt wants to know if an antibiotic could be called into her pharmacy.

## 2020-02-10 DIAGNOSIS — I10 BENIGN ESSENTIAL HYPERTENSION: ICD-10-CM

## 2020-02-10 RX ORDER — PROPRANOLOL HYDROCHLORIDE 60 MG/1
TABLET ORAL
Qty: 60 TABLET | Refills: 9 | Status: SHIPPED | OUTPATIENT
Start: 2020-02-10 | End: 2020-12-14 | Stop reason: SDUPTHER

## 2020-03-03 ENCOUNTER — TELEPHONE (OUTPATIENT)
Dept: INTERNAL MEDICINE | Facility: CLINIC | Age: 70
End: 2020-03-03

## 2020-03-03 NOTE — TELEPHONE ENCOUNTER
PATIENT CALLED AND STATES SHE HAS HAD A COUGH FOR ABOUT 2 WEEKS SINCE SHE HAS BEEN IN [place]. NO CONGESTION, NO FEVER, SHE IS TAKING ZYRTEC,  FLONASE AND PEARLS. SHE HAS LATELY COUGHING UP MUCUS.   WHAT ELSE CAN SHE DO. SHOULD SHE COME IN AND SEE SOMEONE OR SOMETHING ELSE BE CALLED IN. PLEASE NAT AND ADVISE [phone]

## 2020-03-04 ENCOUNTER — OFFICE VISIT (OUTPATIENT)
Dept: INTERNAL MEDICINE | Facility: CLINIC | Age: 70
End: 2020-03-04

## 2020-03-04 VITALS
WEIGHT: 167 LBS | DIASTOLIC BLOOD PRESSURE: 86 MMHG | HEIGHT: 66 IN | BODY MASS INDEX: 26.84 KG/M2 | SYSTOLIC BLOOD PRESSURE: 142 MMHG

## 2020-03-04 DIAGNOSIS — J40 BRONCHITIS: Primary | ICD-10-CM

## 2020-03-04 PROCEDURE — 99213 OFFICE O/P EST LOW 20 MIN: CPT | Performed by: NURSE PRACTITIONER

## 2020-03-04 RX ORDER — DEXTROMETHORPHAN HYDROBROMIDE AND PROMETHAZINE HYDROCHLORIDE 15; 6.25 MG/5ML; MG/5ML
5 SYRUP ORAL 4 TIMES DAILY PRN
Qty: 240 ML | Refills: 0 | Status: SHIPPED | OUTPATIENT
Start: 2020-03-04 | End: 2021-09-17

## 2020-03-04 RX ORDER — METHYLPREDNISOLONE 4 MG/1
TABLET ORAL
Qty: 21 TABLET | Refills: 0 | Status: SHIPPED | OUTPATIENT
Start: 2020-03-04 | End: 2021-03-03

## 2020-03-04 RX ORDER — AZITHROMYCIN 250 MG/1
TABLET, FILM COATED ORAL
Qty: 6 TABLET | Refills: 0 | Status: SHIPPED | OUTPATIENT
Start: 2020-03-04 | End: 2020-08-28

## 2020-03-04 NOTE — PROGRESS NOTES
"Subjective     Jana Page is a 69 y.o. female.         Patient presents with:  Cough: cough for 2 weeks, phlegm    Recent travel to [place].    Cough   This is a new problem. The current episode started 1 to 4 weeks ago. The problem has been gradually worsening. The cough is productive of sputum. Pertinent negatives include no chest pain, ear congestion, ear pain, fever, nasal congestion, postnasal drip, sore throat, shortness of breath or wheezing. Treatments tried: flonase, zyrtec, tessalon, delsym. Her past medical history is significant for environmental allergies.        The following portions of the patient's history were reviewed and updated as appropriate: allergies, current medications, past social history and problem list.    Review of Systems   Constitutional: Negative for fever.   HENT: Negative for congestion, ear pain, postnasal drip, sinus pressure, sinus pain and sore throat.    Respiratory: Positive for cough. Negative for shortness of breath and wheezing.    Cardiovascular: Negative for chest pain.   Allergic/Immunologic: Positive for environmental allergies.       Objective     /86 (BP Location: Left arm)   Ht 167.6 cm (66\")   Wt 75.8 kg (167 lb)   BMI 26.95 kg/m²     Physical Exam   Constitutional: She appears well-developed and well-nourished. No distress.   HENT:   Head: Normocephalic and atraumatic.   Nose: No mucosal edema or rhinorrhea. Right sinus exhibits no maxillary sinus tenderness and no frontal sinus tenderness. Left sinus exhibits no maxillary sinus tenderness and no frontal sinus tenderness.   Mouth/Throat: Uvula is midline and mucous membranes are normal. No oropharyngeal exudate or posterior oropharyngeal erythema (clear hypersecretions). No tonsillar exudate.   Eyes: Conjunctivae are normal. Right eye exhibits no discharge. Left eye exhibits no discharge.   Cardiovascular: Normal rate, regular rhythm and normal heart sounds.   No murmur heard.  Pulmonary/Chest: " Effort normal. No tachypnea. She has no decreased breath sounds. She has wheezes. She has no rhonchi.   Lymphadenopathy:        Head (right side): No submental, no submandibular and no tonsillar adenopathy present.        Head (left side): No submental, no submandibular and no tonsillar adenopathy present.   Neurological: She is alert.   Skin: She is not diaphoretic.   Psychiatric: She has a normal mood and affect.   Vitals reviewed.      Assessment/Plan     Jana was seen today for cough.    Diagnoses and all orders for this visit:    Bronchitis  -     azithromycin (ZITHROMAX) 250 MG tablet; Take 2 tablets the first day, then 1 tablet daily for 4 days.  -     methylPREDNISolone (MEDROL, VALDO,) 4 MG tablet; Take as directed on package instructions.  -     promethazine-dextromethorphan (PROMETHAZINE-DM) 6.25-15 MG/5ML syrup; Take 5 mL by mouth 4 (Four) Times a Day As Needed for Cough.    She will continue Zyrtec and Flonase.    Increase fluid intake and rest.    Return for worsening of sx.

## 2020-03-06 ENCOUNTER — TELEPHONE (OUTPATIENT)
Dept: INTERNAL MEDICINE | Facility: CLINIC | Age: 70
End: 2020-03-06

## 2020-03-06 NOTE — TELEPHONE ENCOUNTER
She can get Deslym OTC. She can also take plain Mucinex for chest congestion, best thing for nasal congestion is flonase. She needs to avoid decongestants b/c of HTN.

## 2020-03-06 NOTE — TELEPHONE ENCOUNTER
PT CALLED THAT THE COUGH MEDICINE THAT WAS PRESCRIBED IS ON BACK ORDER AT HER PHARMACY.     PT WANTS TO KNOW IF THERE IS ANOTHER COUGH MEDICINE THAT CAN BE PRESCRIBED TO HER.     PT WANTS TO KNOW IF THERE IS SOMETHING SHE NEEDS TO BE TAKING FOR CONGESTION.    [facility] CONFIRMED     PT CALL BACK   [phone]     PT STATES YOU MAY LEAVE A VOICEMAIL

## 2020-03-08 DIAGNOSIS — F41.9 ANXIETY: ICD-10-CM

## 2020-03-08 DIAGNOSIS — R42 LIGHTHEADEDNESS: ICD-10-CM

## 2020-03-09 RX ORDER — DULOXETIN HYDROCHLORIDE 60 MG/1
CAPSULE, DELAYED RELEASE ORAL
Qty: 90 CAPSULE | Refills: 3 | Status: SHIPPED | OUTPATIENT
Start: 2020-03-09 | End: 2020-06-25 | Stop reason: SDUPTHER

## 2020-03-10 DIAGNOSIS — I10 BENIGN ESSENTIAL HYPERTENSION: ICD-10-CM

## 2020-03-10 RX ORDER — AMLODIPINE BESYLATE 5 MG/1
TABLET ORAL
Qty: 90 TABLET | Refills: 0 | Status: SHIPPED | OUTPATIENT
Start: 2020-03-10 | End: 2020-06-11

## 2020-04-29 ENCOUNTER — TELEPHONE (OUTPATIENT)
Dept: INTERNAL MEDICINE | Facility: CLINIC | Age: 70
End: 2020-04-29

## 2020-04-29 NOTE — TELEPHONE ENCOUNTER
PATIENT CALLED IN AND STATED SHE IS HAVING SOME LEG PAIN IN HER LEFT .  PATIENT WANTS TO KNOW IF SOME OF HER MEDICATION IS CAUSING THIS . PLEASE CALL PATIENT AND ADVISE [phone].

## 2020-05-06 DIAGNOSIS — E78.00 HYPERCHOLESTEROLEMIA: ICD-10-CM

## 2020-05-07 RX ORDER — ATORVASTATIN CALCIUM 20 MG/1
TABLET, FILM COATED ORAL
Qty: 90 TABLET | Refills: 0 | Status: SHIPPED | OUTPATIENT
Start: 2020-05-07 | End: 2020-06-25 | Stop reason: SDUPTHER

## 2020-06-11 DIAGNOSIS — I10 BENIGN ESSENTIAL HYPERTENSION: ICD-10-CM

## 2020-06-11 RX ORDER — AMLODIPINE BESYLATE 5 MG/1
TABLET ORAL
Qty: 90 TABLET | Refills: 3 | Status: SHIPPED | OUTPATIENT
Start: 2020-06-11 | End: 2021-03-02 | Stop reason: SDUPTHER

## 2020-06-25 DIAGNOSIS — E78.00 HYPERCHOLESTEROLEMIA: ICD-10-CM

## 2020-06-25 DIAGNOSIS — F41.9 ANXIETY: ICD-10-CM

## 2020-06-25 DIAGNOSIS — R42 LIGHTHEADEDNESS: ICD-10-CM

## 2020-06-25 DIAGNOSIS — I10 BENIGN ESSENTIAL HYPERTENSION: ICD-10-CM

## 2020-06-25 NOTE — TELEPHONE ENCOUNTER
Caller: Jana Page    Relationship: Self    Best call back number: [phone]    Medication needed:   Requested Prescriptions     Pending Prescriptions Disp Refills   • olmesartan (BENICAR) 40 MG tablet 30 tablet 9     Sig: Take 1 tablet by mouth Daily.   atorvastatin (LIPITOR) 20 MG tablet  DULoxetine (CYMBALTA) 60 MG capsule      When do you need the refill by: 7/3/2020    What details did the patient provide when requesting the medication:   PATIENT IS LEAVING ON VACATION AND IS REQUESTING HER REFILLS BE COMPLETED SO SHE DOESN'T RUN OUT WHILE SHE IS AWAY     Does the patient have less than a 3 day supply:  [] Yes  [x] No    What is the patient's preferred pharmacy:  [facility] [address] [place] [address] [address] AT [facility] [address]  - [phone] [facility] [phone] FX                ”

## 2020-06-26 RX ORDER — ATORVASTATIN CALCIUM 20 MG/1
20 TABLET, FILM COATED ORAL DAILY
Qty: 90 TABLET | Refills: 3 | Status: SHIPPED | OUTPATIENT
Start: 2020-06-26 | End: 2021-03-02 | Stop reason: SDUPTHER

## 2020-06-26 RX ORDER — OLMESARTAN MEDOXOMIL 40 MG/1
40 TABLET ORAL DAILY
Qty: 90 TABLET | Refills: 3 | Status: SHIPPED | OUTPATIENT
Start: 2020-06-26 | End: 2021-03-02 | Stop reason: SDUPTHER

## 2020-06-26 RX ORDER — DULOXETIN HYDROCHLORIDE 60 MG/1
60 CAPSULE, DELAYED RELEASE ORAL DAILY
Qty: 90 CAPSULE | Refills: 3 | Status: SHIPPED | OUTPATIENT
Start: 2020-06-26 | End: 2021-03-02 | Stop reason: SDUPTHER

## 2020-08-26 NOTE — TELEPHONE ENCOUNTER
PT CALLED STATING SHE NEEDS A REFILL ON HER BLOOD GLUCOSE TEST STRIPS (CANNOT FIND ON HER MED LIST)    SHE IS REQUESTING THESE BE SENT TO [facility] - [place], [place] - [address] AT [facility] [address] - [phone]  - [phone] FX  [phone]    FORMER PT OF DR. ROMERO, HAS APPT WITH DR. MARQUEZ ON Friday.    CALLBACK NUMBER: [phone]

## 2020-08-28 ENCOUNTER — OFFICE VISIT (OUTPATIENT)
Dept: INTERNAL MEDICINE | Facility: CLINIC | Age: 70
End: 2020-08-28

## 2020-08-28 VITALS
TEMPERATURE: 98.9 F | OXYGEN SATURATION: 95 % | HEIGHT: 66 IN | DIASTOLIC BLOOD PRESSURE: 90 MMHG | SYSTOLIC BLOOD PRESSURE: 124 MMHG | HEART RATE: 60 BPM | BODY MASS INDEX: 27.48 KG/M2 | WEIGHT: 171 LBS

## 2020-08-28 DIAGNOSIS — E11.9 TYPE 2 DIABETES MELLITUS WITHOUT COMPLICATION, WITHOUT LONG-TERM CURRENT USE OF INSULIN (HCC): ICD-10-CM

## 2020-08-28 DIAGNOSIS — Z12.31 BREAST CANCER SCREENING BY MAMMOGRAM: ICD-10-CM

## 2020-08-28 DIAGNOSIS — Z13.1 SCREENING FOR DIABETES MELLITUS: ICD-10-CM

## 2020-08-28 DIAGNOSIS — Z13.0 SCREENING FOR DEFICIENCY ANEMIA: ICD-10-CM

## 2020-08-28 DIAGNOSIS — E55.9 VITAMIN D DEFICIENCY: ICD-10-CM

## 2020-08-28 DIAGNOSIS — Z00.00 MEDICARE ANNUAL WELLNESS VISIT, SUBSEQUENT: Primary | ICD-10-CM

## 2020-08-28 DIAGNOSIS — E78.00 HYPERCHOLESTEROLEMIA: ICD-10-CM

## 2020-08-28 LAB
25(OH)D3+25(OH)D2 SERPL-MCNC: 43.3 NG/ML (ref 30–100)
ALBUMIN SERPL-MCNC: 5 G/DL (ref 3.5–5.2)
ALBUMIN/GLOB SERPL: 3.1 G/DL
ALP SERPL-CCNC: 82 U/L (ref 39–117)
ALT SERPL-CCNC: 21 U/L (ref 1–33)
AST SERPL-CCNC: 18 U/L (ref 1–32)
BILIRUB SERPL-MCNC: 0.6 MG/DL (ref 0–1.2)
BUN SERPL-MCNC: 11 MG/DL (ref 8–23)
BUN/CREAT SERPL: 14.5 (ref 7–25)
CALCIUM SERPL-MCNC: 9.4 MG/DL (ref 8.6–10.5)
CHLORIDE SERPL-SCNC: 104 MMOL/L (ref 98–107)
CHOLEST SERPL-MCNC: 163 MG/DL (ref 0–200)
CO2 SERPL-SCNC: 29 MMOL/L (ref 22–29)
CREAT SERPL-MCNC: 0.76 MG/DL (ref 0.57–1)
ERYTHROCYTE [DISTWIDTH] IN BLOOD BY AUTOMATED COUNT: 12.9 % (ref 12.3–15.4)
GLOBULIN SER CALC-MCNC: 1.6 GM/DL
GLUCOSE SERPL-MCNC: 118 MG/DL (ref 65–99)
HBA1C MFR BLD: 5.2 % (ref 4.8–5.6)
HCT VFR BLD AUTO: 42.2 % (ref 34–46.6)
HDLC SERPL-MCNC: 59 MG/DL (ref 40–60)
HGB BLD-MCNC: 14.1 G/DL (ref 12–15.9)
LDLC SERPL CALC-MCNC: 87 MG/DL (ref 0–100)
MCH RBC QN AUTO: 30.3 PG (ref 26.6–33)
MCHC RBC AUTO-ENTMCNC: 33.4 G/DL (ref 31.5–35.7)
MCV RBC AUTO: 90.6 FL (ref 79–97)
PLATELET # BLD AUTO: 288 10*3/MM3 (ref 140–450)
POTASSIUM SERPL-SCNC: 4.2 MMOL/L (ref 3.5–5.2)
PROT SERPL-MCNC: 6.6 G/DL (ref 6–8.5)
RBC # BLD AUTO: 4.66 10*6/MM3 (ref 3.77–5.28)
SODIUM SERPL-SCNC: 139 MMOL/L (ref 136–145)
TRIGL SERPL-MCNC: 85 MG/DL (ref 0–150)
VLDLC SERPL CALC-MCNC: 17 MG/DL
WBC # BLD AUTO: 7.47 10*3/MM3 (ref 3.4–10.8)

## 2020-08-28 PROCEDURE — G0439 PPPS, SUBSEQ VISIT: HCPCS | Performed by: FAMILY MEDICINE

## 2020-08-28 NOTE — PROGRESS NOTES
The ABCs of the Annual Wellness Visit  Subsequent Medicare Wellness Visit    Chief Complaint   Patient presents with   • Establish Care   • Medicare Wellness-subsequent   • Diabetes       Subjective   History of Present Illness:  Jana Page is a 69 y.o. female who presents for a Subsequent Medicare Wellness Visit.    HEALTH RISK ASSESSMENT    Recent Hospitalizations:  No hospitalization(s) within the last year.    Current Medical Providers:  Patient Care Team:  Sabino Trinidad MD as PCP - General (Family Medicine)  Vicenta Newberry MD as PCP - Claims Attributed    Smoking Status:  Social History     Tobacco Use   Smoking Status Former Smoker   • Packs/day: 1.00   • Years: 10.00   • Pack years: 10.00   • Types: Cigarettes   • Last attempt to quit:    • Years since quittin.6   Smokeless Tobacco Never Used       Alcohol Consumption:  Social History     Substance and Sexual Activity   Alcohol Use Yes    Comment: social       Depression Screen:   PHQ-2/PHQ-9 Depression Screening 2020   Little interest or pleasure in doing things 0   Feeling down, depressed, or hopeless 0   Trouble falling or staying asleep, or sleeping too much 1   Feeling tired or having little energy 1   Poor appetite or overeating 0   Feeling bad about yourself - or that you are a failure or have let yourself or your family down 0   Trouble concentrating on things, such as reading the newspaper or watching television 1   Moving or speaking so slowly that other people could have noticed. Or the opposite - being so fidgety or restless that you have been moving around a lot more than usual 0   Thoughts that you would be better off dead, or of hurting yourself in some way 0   Total Score 3       Fall Risk Screen:  STEADI Fall Risk Assessment was completed, and patient is at MODERATE risk for falls. Assessment completed on:2020    Health Habits and Functional and Cognitive Screening:  Functional & Cognitive Status 2020   Do  you have difficulty preparing food and eating? No   Do you have difficulty bathing yourself, getting dressed or grooming yourself? No   Do you have difficulty using the toilet? No   Do you have difficulty moving around from place to place? No   Do you have trouble with steps or getting out of a bed or a chair? No   Current Diet Well Balanced Diet   Dental Exam Up to date   Eye Exam Up to date   Exercise (times per week) 0 times per week   Current Exercise Activities Include Walking   Do you need help using the phone?  No   Are you deaf or do you have serious difficulty hearing?  No   Do you need help with transportation? No   Do you need help shopping? No   Do you need help preparing meals?  No   Do you need help with housework?  No   Do you need help with laundry? No   Do you need help taking your medications? No   Do you need help managing money? No   Do you ever drive or ride in a car without wearing a seat belt? No   Have you felt unusual stress, anger or loneliness in the last month? Yes   Who do you live with? Alone   If you need help, do you have trouble finding someone available to you? No   Have you been bothered in the last four weeks by sexual problems? No   Do you have difficulty concentrating, remembering or making decisions? Yes         Does the patient have evidence of cognitive impairment? No    Asprin use counseling:Taking ASA appropriately as indicated    Age-appropriate Screening Schedule:  Refer to the list below for future screening recommendations based on patient's age, sex and/or medical conditions. Orders for these recommended tests are listed in the plan section. The patient has been provided with a written plan.    Health Maintenance   Topic Date Due   • URINE MICROALBUMIN  1950   • HEMOGLOBIN A1C  05/19/2020   • LIPID PANEL  08/16/2020   • DIABETIC FOOT EXAM  08/23/2020   • INFLUENZA VACCINE  08/01/2020   • MAMMOGRAM  09/25/2020   • DIABETIC EYE EXAM  09/27/2020   • COLONOSCOPY   06/14/2021   • DXA SCAN  02/19/2024   • TDAP/TD VACCINES (4 - Td) 07/08/2029   • ZOSTER VACCINE  Addressed          The following portions of the patient's history were reviewed and updated as appropriate: allergies, current medications, past family history, past medical history, past social history, past surgical history and problem list.    Outpatient Medications Prior to Visit   Medication Sig Dispense Refill   • ACCU-CHEK FASTCLIX LANCETS misc DX. Ell.9 100 each 2   • amLODIPine (NORVASC) 5 MG tablet TAKE ONE TABLET BY MOUTH DAILY 90 tablet 3   • aspirin 81 MG EC tablet Take 81 mg by mouth Daily.     • atorvastatin (LIPITOR) 20 MG tablet Take 1 tablet by mouth Daily. 90 tablet 3   • Cholecalciferol (VITAMIN D) 1000 UNITS tablet Take  by mouth.     • Coenzyme Q10 (CO Q 10) 10 MG capsule Take  by mouth Daily.     • diclofenac sodium (VOTAREN XR) 100 MG 24 hr tablet TAKE ONE TABLET BY MOUTH DAILY 30 tablet 4   • DULoxetine (CYMBALTA) 60 MG capsule Take 1 capsule by mouth Daily. 90 capsule 3   • fluticasone (FLONASE) 50 MCG/ACT nasal spray 2 sprays into each nostril daily. Administer 2 sprays in each nostril for each dose.     • methylPREDNISolone (MEDROL, VALDO,) 4 MG tablet Take as directed on package instructions. 21 tablet 0   • Multiple Vitamin (MULTIVITAMIN) capsule Take 1 capsule by mouth Daily.     • olmesartan (BENICAR) 40 MG tablet Take 1 tablet by mouth Daily. 90 tablet 3   • Probiotic Product (PRO-BIOTIC BLEND) capsule Take  by mouth Daily.     • promethazine-dextromethorphan (PROMETHAZINE-DM) 6.25-15 MG/5ML syrup Take 5 mL by mouth 4 (Four) Times a Day As Needed for Cough. 240 mL 0   • propranolol (INDERAL) 60 MG tablet TAKE ONE TABLET BY MOUTH TWICE A DAY 60 tablet 9   • psyllium (METAMUCIL) 58.6 % packet Take 1 packet by mouth Daily.     • azithromycin (ZITHROMAX) 250 MG tablet Take 2 tablets the first day, then 1 tablet daily for 4 days. 6 tablet 0     No facility-administered medications prior to  "visit.        Patient Active Problem List   Diagnosis   • Benign essential hypertension   • Disc disorder of cervical region   • Degeneration of intervertebral disc of lumbar region   • Steatosis of liver   • Fibrocystic breast changes   • Hypercholesterolemia   • Osteoarthritis of knee   • Osteopenia   • Type 2 diabetes mellitus without complication, without long-term current use of insulin (CMS/HCC)   • Scoliosis   • Lipoma of left lower extremity   • Health care maintenance   • Aortic atherosclerosis (CMS/[facility])   • Family history of premature coronary artery disease   • Anxiety   • Arthralgia of metacarpophalangeal joint, right   • Metabolic syndrome       Advanced Care Planning:  ACP discussion was held with the patient during this visit. Patient has an advance directive (not in EMR), copy requested.    Review of Systems    Compared to one year ago, the patient feels her physical health is the same.  Compared to one year ago, the patient feels her mental health is the same.    Reviewed chart for potential of high risk medication in the elderly: yes  Reviewed chart for potential of harmful drug interactions in the elderly:yes    Objective         Vitals:    08/28/20 0920   BP: 124/90   Pulse: 60   Temp: 98.9 °F (37.2 °C)   SpO2: 95%   Weight: 77.6 kg (171 lb)   Height: 167.6 cm (66\")       Body mass index is 27.6 kg/m².  Discussed the patient's BMI with her. The BMI is in the acceptable range.    Physical Exam          Assessment/Plan   Medicare Risks and Personalized Health Plan  CMS Preventative Services Quick Reference  Advance Directive Discussion  Cardiovascular risk  Colon Cancer Screening  Depression/Dysphoria  Fall Risk  Inadequate Social Support, Isolation, Loneliness, Lack of Transportation, Financial Difficulties, or Caregiver Stress   Lung Cancer Risk  Polypharmacy    The above risks/problems have been discussed with the patient.  Pertinent information has been shared with the patient in the After " Visit Summary.  Follow up plans and orders are seen below in the Assessment/Plan Section.    Diagnoses and all orders for this visit:    1. Medicare annual wellness visit, subsequent (Primary)  -     CBC (No Diff)  -     Comprehensive Metabolic Panel  -     Hemoglobin A1c  -     Lipid Panel  -     Vitamin D 25 Hydroxy    2. Screening for deficiency anemia  -     CBC (No Diff)    3. Screening for diabetes mellitus  -     Comprehensive Metabolic Panel    4. Hypercholesterolemia  -     Lipid Panel    5. Type 2 diabetes mellitus without complication, without long-term current use of insulin (CMS/[facility])  -     Comprehensive Metabolic Panel  -     Hemoglobin A1c    6. Breast cancer screening by mammogram    7. Vitamin D deficiency  -     Vitamin D 25 Hydroxy    Other orders  -     Cancel: CBC (No Diff)  -     Cancel: Comprehensive Metabolic Panel  -     Cancel: Lipid Panel  -     Cancel: Mammo Screening Bilateral With CAD; Future      Follow Up:  Return in about 6 months (around 2/28/2021).     An After Visit Summary and PPPS were given to the patient.

## 2020-08-30 DIAGNOSIS — E78.00 HYPERCHOLESTEROLEMIA: Primary | ICD-10-CM

## 2020-08-30 DIAGNOSIS — E11.9 TYPE 2 DIABETES MELLITUS WITHOUT COMPLICATION, WITHOUT LONG-TERM CURRENT USE OF INSULIN (HCC): ICD-10-CM

## 2020-08-30 DIAGNOSIS — I10 BENIGN ESSENTIAL HYPERTENSION: ICD-10-CM

## 2020-09-03 ENCOUNTER — TELEPHONE (OUTPATIENT)
Dept: INTERNAL MEDICINE | Facility: CLINIC | Age: 70
End: 2020-09-03

## 2020-09-04 ENCOUNTER — TELEPHONE (OUTPATIENT)
Dept: INTERNAL MEDICINE | Facility: CLINIC | Age: 70
End: 2020-09-04

## 2020-09-04 NOTE — TELEPHONE ENCOUNTER
PATIENT STATES: that she has some questions about her labs she got in he mail please advise     PATIENT CAN BE REACHED ON:[phone]

## 2020-09-04 NOTE — TELEPHONE ENCOUNTER
Pt was curious about her elevated glucose  I advised her she was not considered diabetic she is prediabetic with her A1c @ 5.2

## 2020-12-11 ENCOUNTER — APPOINTMENT (OUTPATIENT)
Dept: WOMENS IMAGING | Facility: HOSPITAL | Age: 70
End: 2020-12-11

## 2020-12-11 PROCEDURE — 77063 BREAST TOMOSYNTHESIS BI: CPT | Performed by: RADIOLOGY

## 2020-12-11 PROCEDURE — 77067 SCR MAMMO BI INCL CAD: CPT | Performed by: RADIOLOGY

## 2020-12-14 DIAGNOSIS — I10 BENIGN ESSENTIAL HYPERTENSION: ICD-10-CM

## 2020-12-14 RX ORDER — PROPRANOLOL HYDROCHLORIDE 60 MG/1
60 TABLET ORAL 2 TIMES DAILY
Qty: 180 TABLET | Refills: 3 | Status: SHIPPED | OUTPATIENT
Start: 2020-12-14 | End: 2021-12-20

## 2020-12-14 NOTE — TELEPHONE ENCOUNTER
Caller: Jana Page    Relationship: Self    Best call back number:[phone]  Medication needed:   Requested Prescriptions     Pending Prescriptions Disp Refills   • propranolol (INDERAL) 60 MG tablet 60 tablet 9     Sig: Take 1 tablet by mouth 2 (Two) Times a Day.       When do you need the refill by: TODAY     What details did the patient provide when requesting the medication:PATIENT IS OUT OF MEDICATION PHARMACY DID GIVE A FEW TIL REFILLED     Does the patient have less than a 3 day supply:  [x] Yes  [] No    What is the patient's preferred pharmacy: [facility] [address] [place] [address] [address] [facility] [address]  - [phone] [facility] [phone] FX

## 2021-03-02 ENCOUNTER — OFFICE VISIT (OUTPATIENT)
Dept: INTERNAL MEDICINE | Facility: CLINIC | Age: 71
End: 2021-03-02

## 2021-03-02 VITALS
HEART RATE: 63 BPM | SYSTOLIC BLOOD PRESSURE: 124 MMHG | DIASTOLIC BLOOD PRESSURE: 81 MMHG | WEIGHT: 173.8 LBS | TEMPERATURE: 98.2 F | RESPIRATION RATE: 16 BRPM | HEIGHT: 66 IN | BODY MASS INDEX: 27.93 KG/M2 | OXYGEN SATURATION: 98 %

## 2021-03-02 DIAGNOSIS — E78.00 HYPERCHOLESTEROLEMIA: ICD-10-CM

## 2021-03-02 DIAGNOSIS — F41.9 ANXIETY: ICD-10-CM

## 2021-03-02 DIAGNOSIS — E11.9 TYPE 2 DIABETES MELLITUS WITHOUT COMPLICATION, WITHOUT LONG-TERM CURRENT USE OF INSULIN (HCC): Primary | ICD-10-CM

## 2021-03-02 DIAGNOSIS — M85.80 OSTEOPENIA, UNSPECIFIED LOCATION: ICD-10-CM

## 2021-03-02 DIAGNOSIS — R42 LIGHTHEADEDNESS: ICD-10-CM

## 2021-03-02 DIAGNOSIS — I10 BENIGN ESSENTIAL HYPERTENSION: ICD-10-CM

## 2021-03-02 DIAGNOSIS — Z78.0 POSTMENOPAUSAL: ICD-10-CM

## 2021-03-02 PROCEDURE — 99214 OFFICE O/P EST MOD 30 MIN: CPT | Performed by: FAMILY MEDICINE

## 2021-03-02 RX ORDER — AMLODIPINE BESYLATE 5 MG/1
5 TABLET ORAL DAILY
Qty: 90 TABLET | Refills: 3 | Status: SHIPPED | OUTPATIENT
Start: 2021-03-02 | End: 2022-03-14

## 2021-03-02 RX ORDER — OLMESARTAN MEDOXOMIL 40 MG/1
40 TABLET ORAL DAILY
Qty: 90 TABLET | Refills: 3 | Status: SHIPPED | OUTPATIENT
Start: 2021-03-02 | End: 2022-03-23

## 2021-03-02 RX ORDER — DULOXETIN HYDROCHLORIDE 60 MG/1
60 CAPSULE, DELAYED RELEASE ORAL DAILY
Qty: 90 CAPSULE | Refills: 3 | Status: SHIPPED | OUTPATIENT
Start: 2021-03-02 | End: 2022-03-28

## 2021-03-02 RX ORDER — ATORVASTATIN CALCIUM 20 MG/1
20 TABLET, FILM COATED ORAL DAILY
Qty: 90 TABLET | Refills: 3 | Status: SHIPPED | OUTPATIENT
Start: 2021-03-02 | End: 2022-04-29

## 2021-03-02 NOTE — PROGRESS NOTES
"Chief Complaint  Diabetes (6 month follow up ), Hypertension, and Osteoarthritis    Subjective          Jana Page presents to [facility]  History of Present Illness    Hypertension - stable.  Patient taking medication as prescribed.  Denies chest pain, shortness of breath, headache, lower extremity edema.  Patient is taking amlodipine,olmesartan, propranolol .    HLD-stable.  Patient taking atorvastatin as prescribed.  Trying to adhere to a low-fat diet.    Diabetes mellitus-stable.  No new numbness, tingling.  Patient is taking no meds currently.  Diet controlled.  Last A1c was 5.20.      Anxiety disorder-stable.  Patient is taking cymbalta and needs a refill.  The patient is not having any side effects.  Denies depression, poor concentration, poor sleep, fatigue, irritability.    Also needs a DEXA ordered for osteoporosis monitoring.      Review of Systems   Constitutional: Negative for activity change, fatigue and fever.   Respiratory: Negative for cough and shortness of breath.    Cardiovascular: Negative for chest pain and palpitations.   Gastrointestinal: Negative for abdominal pain.   Psychiatric/Behavioral: Negative for dysphoric mood. The patient is not nervous/anxious.      Objective   Vital Signs:   /81 (BP Location: Left arm, Patient Position: Sitting, Cuff Size: Adult)   Pulse 63   Temp 98.2 °F (36.8 °C) (Temporal)   Resp 16   Ht 167.6 cm (66\")   Wt 78.8 kg (173 lb 12.8 oz)   SpO2 98%   BMI 28.05 kg/m²     Physical Exam  Vitals signs and nursing note reviewed.   Constitutional:       General: She is not in acute distress.     Appearance: Normal appearance.   Cardiovascular:      Rate and Rhythm: Normal rate and regular rhythm.      Heart sounds: Normal heart sounds.   Pulmonary:      Effort: Pulmonary effort is normal.      Breath sounds: Normal breath sounds.   Neurological:      Mental Status: She is alert.   Psychiatric:         Mood and Affect: " Mood normal.         Behavior: Behavior normal.         Thought Content: Thought content normal.         Judgment: Judgment normal.        Result Review :   The following data was reviewed by: Sabino Trinidad MD on 03/02/2021:  Common labs    Common Labsle 8/28/20 8/28/20 8/28/20 8/28/20    1057 1057 1057 1057   Glucose  118 (A)     BUN  11     Creatinine  0.76     eGFR Non  Am  75     eGFR African Am  91     Sodium  139     Potassium  4.2     Chloride  104     Calcium  9.4     Total Protein  6.6     Albumin  5.00     Total Bilirubin  0.6     Alkaline Phosphatase  82     AST (SGOT)  18     ALT (SGPT)  21     WBC 7.47      Hemoglobin 14.1      Hematocrit 42.2      Platelets 288      Total Cholesterol    163   Triglycerides    85   HDL Cholesterol    59   LDL Cholesterol     87   Hemoglobin A1C   5.20    (A) Abnormal value       Comments are available for some flowsheets but are not being displayed.                     Assessment and Plan    Diagnoses and all orders for this visit:    1. Type 2 diabetes mellitus without complication, without long-term current use of insulin (CMS/[facility]) (Primary)  -     Microalbumin / Creatinine Urine Ratio - Urine, Clean Catch; Future  -     CBC (No Diff); Future  -     Comprehensive Metabolic Panel; Future  -     Hemoglobin A1c; Future    2. Benign essential hypertension  -     CBC (No Diff); Future  -     Comprehensive Metabolic Panel; Future  -     amLODIPine (NORVASC) 5 MG tablet; Take 1 tablet by mouth Daily.  Dispense: 90 tablet; Refill: 3  -     olmesartan (BENICAR) 40 MG tablet; Take 1 tablet by mouth Daily.  Dispense: 90 tablet; Refill: 3    3. Hypercholesterolemia  -     CBC (No Diff); Future  -     Comprehensive Metabolic Panel; Future  -     Lipid Panel; Future  -     atorvastatin (LIPITOR) 20 MG tablet; Take 1 tablet by mouth Daily.  Dispense: 90 tablet; Refill: 3    4. Postmenopausal  -     DEXA Bone Density Axial; Future    5. Osteopenia, unspecified location  -      DEXA Bone Density Axial; Future    6. Lightheadedness  -     DULoxetine (CYMBALTA) 60 MG capsule; Take 1 capsule by mouth Daily.  Dispense: 90 capsule; Refill: 3    7. Anxiety  -     DULoxetine (CYMBALTA) 60 MG capsule; Take 1 capsule by mouth Daily.  Dispense: 90 capsule; Refill: 3      Labs as above and refilled her medications.  Ordered her DEXA.  I will see her back for her AWV.  Continue all meds as above.        Follow Up   Return in about 7 months (around 10/1/2021) for Medicare Wellness.  Patient was given instructions and counseling regarding her condition or for health maintenance advice. Please see specific information pulled into the AVS if appropriate.

## 2021-03-03 PROBLEM — F41.9 ANXIETY: Chronic | Status: ACTIVE | Noted: 2017-03-14

## 2021-04-21 ENCOUNTER — TELEPHONE (OUTPATIENT)
Dept: INTERNAL MEDICINE | Facility: CLINIC | Age: 71
End: 2021-04-21

## 2021-04-21 NOTE — TELEPHONE ENCOUNTER
Pt advised to go to the Urgent Care-she stated she had been to [facility] before and if she didn't start feeling better today she would go later today.

## 2021-04-21 NOTE — TELEPHONE ENCOUNTER
Caller: Jana Page    Relationship: Self    Best call back number: [phone]    What medication are you requesting: PATIENT DOES NOT KNOW THE NAME, BUT SAID IT WAS TWO MEDICATIONS PRESCRIBED BY DR. DAWN FOR DIVERTICULITIS.     What are your current symptoms: DIARREAH, BURNING IN STOMACH     How long have you been experiencing symptoms: SINCE LAST NIGHT     Have you had these symptoms before:    [x] Yes  [] No    Have you been treated for these symptoms before:   [x] Yes  [] No    If a prescription is needed, what is your preferred pharmacy and phone number: [facility] [address] AT [facility] [address]  - [phone] [facility] [phone] FX

## 2021-09-10 ENCOUNTER — TELEPHONE (OUTPATIENT)
Dept: INTERNAL MEDICINE | Facility: CLINIC | Age: 71
End: 2021-09-10

## 2021-09-17 ENCOUNTER — OFFICE VISIT (OUTPATIENT)
Dept: INTERNAL MEDICINE | Facility: CLINIC | Age: 71
End: 2021-09-17

## 2021-09-17 VITALS
DIASTOLIC BLOOD PRESSURE: 87 MMHG | TEMPERATURE: 96 F | OXYGEN SATURATION: 97 % | WEIGHT: 163 LBS | SYSTOLIC BLOOD PRESSURE: 142 MMHG | HEIGHT: 66 IN | HEART RATE: 65 BPM | BODY MASS INDEX: 26.2 KG/M2

## 2021-09-17 DIAGNOSIS — Z12.11 SCREEN FOR COLON CANCER: ICD-10-CM

## 2021-09-17 DIAGNOSIS — R42 DIZZY SPELLS: ICD-10-CM

## 2021-09-17 DIAGNOSIS — I10 BENIGN ESSENTIAL HYPERTENSION: ICD-10-CM

## 2021-09-17 DIAGNOSIS — R23.2 HOT FLASHES: ICD-10-CM

## 2021-09-17 DIAGNOSIS — R19.7 DIARRHEA, UNSPECIFIED TYPE: ICD-10-CM

## 2021-09-17 DIAGNOSIS — R73.03 PREDIABETES: ICD-10-CM

## 2021-09-17 DIAGNOSIS — Z00.00 MEDICARE ANNUAL WELLNESS VISIT, SUBSEQUENT: Primary | ICD-10-CM

## 2021-09-17 DIAGNOSIS — E78.00 HYPERCHOLESTEROLEMIA: ICD-10-CM

## 2021-09-17 PROBLEM — I70.0 AORTIC ATHEROSCLEROSIS (HCC): Chronic | Status: ACTIVE | Noted: 2017-02-07

## 2021-09-17 PROCEDURE — 99214 OFFICE O/P EST MOD 30 MIN: CPT | Performed by: FAMILY MEDICINE

## 2021-09-17 PROCEDURE — G0439 PPPS, SUBSEQ VISIT: HCPCS | Performed by: FAMILY MEDICINE

## 2021-09-17 NOTE — PROGRESS NOTES
The ABCs of the Annual Wellness Visit  Subsequent Medicare Wellness Visit    Chief Complaint   Patient presents with   • Medicare Wellness-subsequent     awv      Subjective    History of Present Illness:  Jana Page is a 71 y.o. female who presents for a Subsequent Medicare Wellness Visit.    The following portions of the patient's history were reviewed and   updated as appropriate: allergies, current medications, past family history, past medical history, past social history, past surgical history and problem list.    Compared to one year ago, the patient feels her physical   health is the same.    Compared to one year ago, the patient feels her mental   health is the same.    Recent Hospitalizations:  She was not admitted to the hospital during the last year.       Current Medical Providers:  Patient Care Team:  Sabino Trinidad MD as PCP - General (Family Medicine)    Outpatient Medications Prior to Visit   Medication Sig Dispense Refill   • ACCU-CHEK FASTCLIX LANCETS misc DX. Ell.9 100 each 2   • amLODIPine (NORVASC) 5 MG tablet Take 1 tablet by mouth Daily. 90 tablet 3   • aspirin 81 MG EC tablet Take 81 mg by mouth Daily.     • atorvastatin (LIPITOR) 20 MG tablet Take 1 tablet by mouth Daily. 90 tablet 3   • Cholecalciferol (VITAMIN D) 1000 UNITS tablet Take  by mouth.     • Coenzyme Q10 (CO Q 10) 10 MG capsule Take  by mouth Daily.     • DULoxetine (CYMBALTA) 60 MG capsule Take 1 capsule by mouth Daily. 90 capsule 3   • fluticasone (FLONASE) 50 MCG/ACT nasal spray 2 sprays into each nostril daily. Administer 2 sprays in each nostril for each dose.     • Multiple Vitamin (MULTIVITAMIN) capsule Take 1 capsule by mouth Daily.     • olmesartan (BENICAR) 40 MG tablet Take 1 tablet by mouth Daily. 90 tablet 3   • Probiotic Product (PRO-BIOTIC BLEND) capsule Take  by mouth Daily.     • propranolol (INDERAL) 60 MG tablet Take 1 tablet by mouth 2 (Two) Times a Day. 180 tablet 3   • psyllium (METAMUCIL) 58.6 %  packet Take 1 packet by mouth Daily.     • promethazine-dextromethorphan (PROMETHAZINE-DM) 6.25-15 MG/5ML syrup Take 5 mL by mouth 4 (Four) Times a Day As Needed for Cough. 240 mL 0     No facility-administered medications prior to visit.       No opioid medication identified on active medication list. I have reviewed chart for other potential  high risk medication/s and harmful drug interactions in the elderly.          Aspirin is on active medication list. Aspirin use is indicated based on review of current medical condition/s. Pros and cons of this therapy have been discussed today. Benefits of this medication outweigh potential harm.  Patient has been encouraged to continue taking this medication.  .      Patient Active Problem List   Diagnosis   • Benign essential hypertension   • Disc disorder of cervical region   • Degeneration of intervertebral disc of lumbar region   • Steatosis of liver   • Fibrocystic breast changes   • Hypercholesterolemia   • Osteoarthritis of knee   • Osteopenia   • Prediabetes   • Scoliosis   • Lipoma of left lower extremity   • Health care maintenance   • Aortic atherosclerosis (CMS/HCC)   • Family history of premature coronary artery disease   • Anxiety   • Arthralgia of metacarpophalangeal joint, right   • Metabolic syndrome     Advance Care Planning  Advance Directive is not on file.  ACP discussion was held with the patient during this visit. Patient has an advance directive (not in EMR), copy requested.    Review of Systems   Constitutional: Negative for activity change, fatigue and fever.   Respiratory: Negative for cough and shortness of breath.    Cardiovascular: Negative for chest pain and palpitations.   Gastrointestinal: Negative for abdominal pain.        Objective    Vitals:    09/17/21 1137   BP: 142/87   BP Location: Left arm   Patient Position: Sitting   Cuff Size: Adult   Pulse: 65   Temp: 96 °F (35.6 °C)   SpO2: 97%   Weight: 73.9 kg (163 lb)   Height: 167.6 cm  "(65.98\")   PainSc: 0-No pain     Body mass index is 26.32 kg/m².  BMI has not been calculated during today's encounter.     Does the patient have evidence of cognitive impairment? No    Physical Exam  Vitals and nursing note reviewed.   Constitutional:       General: She is not in acute distress.     Appearance: Normal appearance.   Cardiovascular:      Rate and Rhythm: Normal rate and regular rhythm.      Heart sounds: Normal heart sounds. No murmur heard.     Pulmonary:      Effort: Pulmonary effort is normal.      Breath sounds: Normal breath sounds.   Neurological:      Mental Status: She is alert.                 HEALTH RISK ASSESSMENT    Smoking Status:  Social History     Tobacco Use   Smoking Status Former Smoker   • Packs/day: 1.00   • Years: 10.00   • Pack years: 10.00   • Types: Cigarettes   • Quit date:    • Years since quittin.7   Smokeless Tobacco Never Used     Alcohol Consumption:  Social History     Substance and Sexual Activity   Alcohol Use Yes    Comment: social     Fall Risk Screen:    [facility], and patient is at HIGH risk for falls. Assessment completed on:2021    Depression Screening:  PHQ-2/PHQ-9 Depression Screening 3/3/2021   Little interest or pleasure in doing things 0   Feeling down, depressed, or hopeless 0   Trouble falling or staying asleep, or sleeping too much -   Feeling tired or having little energy -   Poor appetite or overeating -   Feeling bad about yourself - or that you are a failure or have let yourself or your family down -   Trouble concentrating on things, such as reading the newspaper or watching television -   Moving or speaking so slowly that other people could have noticed. Or the opposite - being so fidgety or restless that you have been moving around a lot more than usual -   Thoughts that you would be better off dead, or of hurting yourself in some way -   Total Score 0       Health Habits and Functional and Cognitive " Screening:  Functional & Cognitive Status 9/17/2021   Do you have difficulty preparing food and eating? No   Do you have difficulty bathing yourself, getting dressed or grooming yourself? No   Do you have difficulty using the toilet? No   Do you have difficulty moving around from place to place? No   Do you have trouble with steps or getting out of a bed or a chair? No   Current Diet Well Balanced Diet   Dental Exam Up to date   Eye Exam Up to date   Exercise (times per week) 2 times per week        Exercise Frequency Comment -   Current Exercises Include (No Data)        Exercise Comment yoga   Current Exercise Activities Include -   Do you need help using the phone?  No   Are you deaf or do you have serious difficulty hearing?  Yes   Do you need help with transportation? No   Do you need help shopping? No   Do you need help preparing meals?  No   Do you need help with housework?  No   Do you need help with laundry? No   Do you need help taking your medications? No   Do you need help managing money? No   Do you ever drive or ride in a car without wearing a seat belt? No   Have you felt unusual stress, anger or loneliness in the last month? No   Who do you live with? Other   If you need help, do you have trouble finding someone available to you? No   Have you been bothered in the last four weeks by sexual problems? No   Do you have difficulty concentrating, remembering or making decisions? No       Age-appropriate Screening Schedule:  Refer to the list below for future screening recommendations based on patient's age, sex and/or medical conditions. Orders for these recommended tests are listed in the plan section. The patient has been provided with a written plan.    Health Maintenance   Topic Date Due   • URINE MICROALBUMIN  Never done   • DIABETIC FOOT EXAM  08/23/2020   • DXA SCAN  02/19/2021   • HEMOGLOBIN A1C  02/28/2021   • LIPID PANEL  08/28/2021   • INFLUENZA VACCINE  10/01/2021   • DIABETIC EYE EXAM   12/04/2021   • MAMMOGRAM  12/11/2021   • TDAP/TD VACCINES (4 - Td or Tdap) 07/08/2029   • ZOSTER VACCINE  Addressed   • PAP SMEAR  Discontinued              Assessment/Plan   CMS Preventative Services Quick Reference  Risk Factors Identified During Encounter  Immunizations Discussed/Encouraged (specific Immunizations; COVID19  The above risks/problems have been discussed with the patient.  Follow up actions/plans if indicated are seen below in the Assessment/Plan Section.  Pertinent information has been shared with the patient in the After Visit Summary.    Diagnoses and all orders for this visit:    1. Medicare annual wellness visit, subsequent (Primary)  -     Ambulatory Referral For Screening Colonoscopy    2. Dizzy spells  -     CBC (No Diff)  -     Comprehensive Metabolic Panel  -     TSH  -     T4, free  -     Hemoglobin A1c    3. Hot flashes  -     CBC (No Diff)  -     Comprehensive Metabolic Panel  -     TSH  -     T4, free  -     Hemoglobin A1c    4. Benign essential hypertension  -     CBC (No Diff)  -     Comprehensive Metabolic Panel    5. Hypercholesterolemia  -     CBC (No Diff)  -     Comprehensive Metabolic Panel  -     Lipid Panel    6. Screen for colon cancer  -     Ambulatory Referral For Screening Colonoscopy    7. Prediabetes  -     CBC (No Diff)  -     Comprehensive Metabolic Panel  -     Hemoglobin A1c    8. Diarrhea, unspecified type   -     TSH  -     T4, free           AWV completed as above.    Additional code:    Dizzy spells with sweating /hot flashes (undiagnosed new problems with uncertain prognoses) for short periods of time that spontaneously resolve within 15 minutes.  She says her forehead is dripping with sweat at the time.  Denies chest pain, short of breath, or new peripheral edema.  Last TSH was 2019 and was normal.  She is prediabetic and needs labs.  Blood pressure is controlled today.  We will follow up after I get the labs back.    Also having diarrhea that is intermittent  and initially had it for several days.  Then got better for a short period of time and then a few days ago spent the night on the toilet.  She noticed that Mexican food made it worse.  Think she may had a bug at 1 point.  Recommended staying away from aggravating foods and continue probiotics.  If not improving please follow-up with me and drink plenty of fluids.      Patient has been erroneously marked as diabetic. Based on the available clinical information, she does not have diabetes and should therefore be excluded from diabetic health maintenance and quality measures for the remainder of the reporting period.        Follow Up:   Return in about 6 months (around 3/17/2022) for Recheck - prediabetes, HTN.     An After Visit Summary and PPPS were made available to the patient.

## 2021-09-18 LAB
ALBUMIN SERPL-MCNC: 5.1 G/DL (ref 3.5–5.2)
ALBUMIN/GLOB SERPL: 3.2 G/DL
ALP SERPL-CCNC: 86 U/L (ref 39–117)
ALT SERPL-CCNC: 33 U/L (ref 1–33)
AST SERPL-CCNC: 30 U/L (ref 1–32)
BILIRUB SERPL-MCNC: 0.7 MG/DL (ref 0–1.2)
BUN SERPL-MCNC: 15 MG/DL (ref 8–23)
BUN/CREAT SERPL: 19.7 (ref 7–25)
CALCIUM SERPL-MCNC: 9.8 MG/DL (ref 8.6–10.5)
CHLORIDE SERPL-SCNC: 103 MMOL/L (ref 98–107)
CHOLEST SERPL-MCNC: 113 MG/DL (ref 0–200)
CO2 SERPL-SCNC: 24.3 MMOL/L (ref 22–29)
CREAT SERPL-MCNC: 0.76 MG/DL (ref 0.57–1)
ERYTHROCYTE [DISTWIDTH] IN BLOOD BY AUTOMATED COUNT: 12.6 % (ref 12.3–15.4)
GLOBULIN SER CALC-MCNC: 1.6 GM/DL
GLUCOSE SERPL-MCNC: 103 MG/DL (ref 65–99)
HBA1C MFR BLD: 5.6 % (ref 4.8–5.6)
HCT VFR BLD AUTO: 43 % (ref 34–46.6)
HDLC SERPL-MCNC: 44 MG/DL (ref 40–60)
HGB BLD-MCNC: 14.7 G/DL (ref 12–15.9)
LDLC SERPL CALC-MCNC: 50 MG/DL (ref 0–100)
MCH RBC QN AUTO: 29.9 PG (ref 26.6–33)
MCHC RBC AUTO-ENTMCNC: 34.2 G/DL (ref 31.5–35.7)
MCV RBC AUTO: 87.4 FL (ref 79–97)
PLATELET # BLD AUTO: 293 10*3/MM3 (ref 140–450)
POTASSIUM SERPL-SCNC: 3.8 MMOL/L (ref 3.5–5.2)
PROT SERPL-MCNC: 6.7 G/DL (ref 6–8.5)
RBC # BLD AUTO: 4.92 10*6/MM3 (ref 3.77–5.28)
SODIUM SERPL-SCNC: 139 MMOL/L (ref 136–145)
T4 FREE SERPL-MCNC: 1.53 NG/DL (ref 0.93–1.7)
TRIGL SERPL-MCNC: 100 MG/DL (ref 0–150)
TSH SERPL DL<=0.005 MIU/L-ACNC: 1.08 UIU/ML (ref 0.27–4.2)
VLDLC SERPL CALC-MCNC: 19 MG/DL (ref 5–40)
WBC # BLD AUTO: 9.27 10*3/MM3 (ref 3.4–10.8)

## 2021-09-23 ENCOUNTER — TELEPHONE (OUTPATIENT)
Dept: INTERNAL MEDICINE | Facility: CLINIC | Age: 71
End: 2021-09-23

## 2021-09-23 NOTE — TELEPHONE ENCOUNTER
Caller: Jana Page    Relationship: Self    Best call back number: [phone]     Caller requesting test results:     What test was performed: LABS    When was the test performed: 09/17/21    Where was the test performed: IN OFFICE    Additional notes: PATIENT WOULD LIKE TO KNOW IF HER LAB RESULTS ARE IN

## 2021-09-30 ENCOUNTER — TELEPHONE (OUTPATIENT)
Dept: INTERNAL MEDICINE | Facility: CLINIC | Age: 71
End: 2021-09-30

## 2021-09-30 NOTE — TELEPHONE ENCOUNTER
Left pt a message that unfortunatly we cannot hold back vaccines but if we still have them she will be able to get one.  Gave there the drive thru locations and times for 10/2/2021 curbside flu shot locations

## 2021-09-30 NOTE — TELEPHONE ENCOUNTER
Caller: Jana Page    Relationship to patient: Self    Best call back number: [phone]    Type of visit: FLU SHOT    Requested date: WHEN SHE COMES IN ON 10/14 FOR HER BONE DENSITY SCAN, PLEASE HOLD ONE FOR HER.

## 2021-10-04 ENCOUNTER — OFFICE VISIT (OUTPATIENT)
Dept: INTERNAL MEDICINE | Facility: CLINIC | Age: 71
End: 2021-10-04

## 2021-10-04 VITALS
WEIGHT: 164 LBS | TEMPERATURE: 98.4 F | OXYGEN SATURATION: 98 % | HEART RATE: 58 BPM | HEIGHT: 66 IN | DIASTOLIC BLOOD PRESSURE: 70 MMHG | BODY MASS INDEX: 26.36 KG/M2 | SYSTOLIC BLOOD PRESSURE: 118 MMHG

## 2021-10-04 DIAGNOSIS — I44.0 FIRST DEGREE AV BLOCK: ICD-10-CM

## 2021-10-04 DIAGNOSIS — R42 DIZZINESS: Primary | ICD-10-CM

## 2021-10-04 PROCEDURE — G0008 ADMIN INFLUENZA VIRUS VAC: HCPCS | Performed by: NURSE PRACTITIONER

## 2021-10-04 PROCEDURE — 99214 OFFICE O/P EST MOD 30 MIN: CPT | Performed by: NURSE PRACTITIONER

## 2021-10-04 PROCEDURE — 93000 ELECTROCARDIOGRAM COMPLETE: CPT | Performed by: NURSE PRACTITIONER

## 2021-10-04 PROCEDURE — 90662 IIV NO PRSV INCREASED AG IM: CPT | Performed by: NURSE PRACTITIONER

## 2021-10-04 RX ORDER — LANCETS
EACH MISCELLANEOUS
Qty: 100 EACH | Refills: 2 | Status: SHIPPED | OUTPATIENT
Start: 2021-10-04 | End: 2021-10-06 | Stop reason: SDUPTHER

## 2021-10-04 NOTE — PATIENT INSTRUCTIONS
First-Degree Atrioventricular Block    First-degree atrioventricular (AV) block is a condition that causes the electrical signals that travel from the heart's upper chambers (atria) to its lower chambers (ventricles) to move too slowly. As a result, the heart may beat more slowly than normal.  First-degree AV block is the least serious type of heart block. Second- and third-degree AV blocks are more serious. First-degree AV block can increase your risk of developing a type of irregular heartbeat called atrial fibrillation. It is also associated with a higher risk of needing a pacemaker in the future.  What are the causes?  This condition may be caused by:  · Any condition that damages the electrical pathway that controls the heart's rate and rhythm, such as a heart attack.  · Overstimulation of the nerve that slows down the heart rate (vagus nerve). This cause is common among well-conditioned athletes.  · Some medicines that slow down the heart rate, such as beta blockers or calcium channel blockers.  · Surgery that damages the heart.  Some people are born with this condition (congenital heart block), but most people develop it over time.  What increases the risk?  The risk for this condition increases with age. You are also more likely to develop this condition if you have:  · A history of heart attack.  · Heart failure.  · Coronary heart disease.  · Inflammation of heart muscle (myocarditis).  · Disease of heart muscle (cardiomyopathy).  · Infection of the heart valves (endocarditis).  · Infections or diseases that affect the heart. These include:  ? Lyme disease.  ? Sarcoidosis.  ? Hemochromatosis.  ? Rheumatic fever.  ? Certain muscle disorders.  Babies are more likely to be born with heart block if:  · The baby's mother has an autoimmune disease, such as lupus.  · The baby is born with a heart defect that affects the heart's structure.  · A parent was born with a heart defect.  What are the signs or  symptoms?  This condition usually does not cause any symptoms.  How is this diagnosed?  This condition may be diagnosed based on:  · A physical exam.  · Your medical history.  · A measurement of your pulse or heartbeat.  · Tests. These may include:  ? An electrocardiogram (ECG). This checks for problems with electrical activity in the heart.  ? Ambulatory cardiac monitoring. This is a portable ECG that you wear. It checks your heart's rhythm.  ? An electrophysiology (EP) study. Long, thin tubes (catheters) are placed in your heart. The catheters give information about your heart's electrical signals.  How is this treated?  Usually, treatment is not needed for this condition. In some cases, treatment involves:  · Treating an underlying condition, such as heart disease.  · Changing or stopping any heart medicines that can cause heart block.  Follow these instructions at home:  Alcohol use  · Do not drink alcohol if:  ? Your health care provider tells you not to drink.  ? You are pregnant, may be pregnant, or are planning to become pregnant.  · If you drink alcohol:  ? Limit how much you use to:  § 0-1 drink a day for women.  § 0-2 drinks a day for men.  ? Be aware of how much alcohol is in your drink. In the [place] one drink equals one 12 oz bottle of beer (355 mL), one 5 oz glass of wine (148 mL), or one 1½ oz glass of hard liquor (44 mL).  General instructions    · Take over-the-counter and prescription medicines only as told by your health care provider.  · Follow your health care provider's recommendations to help reduce your risk of heart disease. These may include:  ? Exercising at least 30 minutes on 5 or more days each week (150 minutes). Ask your health care provider what type of exercise is safe for you.  ? Eating a heart-healthy diet with fruits and vegetables, whole grains, low-fat dairy products, and lean proteins like poultry and eggs. Your health care provider or dietitian can help you make healthy  choices.  ? Maintaining a healthy weight.  · Do not use any products that contain nicotine or tobacco, such as cigarettes, e-cigarettes, and chewing tobacco. If you need help quitting, ask your health care provider.  · Keep all follow-up visits as told by your health care provider. This is important.    Where to find more information  · American Heart Association: www.heart.org  · National Heart, Lung, and Blood [place]: www.nhlbi.nih.gov  Contact a health care provider if you:  · Feel like your heart is skipping beats.  · Feel more tired than normal.  · Have swelling in your hands, feet, or lower legs.  Get help right away if you:  · Have symptoms that change or get worse.  · Develop new symptoms.  · Have chest pain, especially if the pain:  ? Feels like crushing or pressure.  ? Spreads to your arms, back, neck, or jaw.  · Feel short of breath.  · Feel light-headed or weak.  · Faint.  These symptoms may represent a serious problem that is an emergency. Do not wait to see if the symptoms will go away. Get medical help right away. Call your local emergency services (911 in the [place] Do not drive yourself to the hospital.  Summary  · First-degree atrioventricular (AV) block is the least serious type of heart block. In this condition, the signals that control heart rate move too slowly. As a result, the heart may beat more slowly than normal.  · Usually, treatment is not needed for this condition. In some cases, you may need to change or stop medicines that may be making the condition worse.  · Healthy lifestyle choices such as exercising regularly, eating a healthy diet, and limiting alcohol are good for your heart.  This information is not intended to replace advice given to you by your health care provider. Make sure you discuss any questions you have with your health care provider.  Document Revised: 10/26/2020 Document Reviewed: 10/26/2020  Elsevier Patient Education © 2021 [employer]    How to Perform the  Epley Maneuver  The Epley maneuver is an exercise that relieves symptoms of vertigo. Vertigo is the feeling that you or your surroundings are moving when they are not. When you feel vertigo, you may feel like the room is spinning and may have trouble walking. The Epley maneuver is used for a type of vertigo caused by a calcium deposit in a part of the inner ear. The maneuver involves changing head positions to help the deposit move out of the area.  You can do this maneuver at home whenever you have symptoms of vertigo. You can repeat it in 24 hours if your vertigo has not gone away.  Even though the Epley maneuver may relieve your vertigo for a few weeks, it is possible that your symptoms will return. This maneuver relieves vertigo, but it does not relieve dizziness.  What are the risks?  If it is done correctly, the Epley maneuver is considered safe. Sometimes it can lead to dizziness or nausea that goes away after a short time. If you develop other symptoms--such as changes in vision, weakness, or numbness--stop doing the maneuver and call your health care provider.  Supplies needed:  · A bed or table.  · A pillow.  How to do the Epley maneuver         1. Sit on the edge of a bed or table with your back straight and your legs extended or hanging over the edge of the bed or table.  2. Turn your head [facility] toward the affected ear or side as told by your health care provider.  3. Lie backward quickly with your head turned until you are lying flat on your back. You may want to position a pillow under your shoulders.  4. Hold this position for at least 30 seconds. If you feel dizzy or have symptoms of vertigo, continue to hold the position until the symptoms stop.  5. Turn your head to the opposite direction until your unaffected ear is facing the floor.  6. Hold this position for at least 30 seconds. If you feel dizzy or have symptoms of vertigo, continue to hold the position until the symptoms stop.  7. Turn your  whole body to the same side as your head so that you are positioned on your side. Your head will now be nearly facedown. Hold for at least 30 seconds. If you feel dizzy or have symptoms of vertigo, continue to hold the position until the symptoms stop.  8. Sit back up.  You can repeat the maneuver in 24 hours if your vertigo does not go away.  Follow these instructions at home:  For 24 hours after doing the Epley maneuver:  · Keep your head in an upright position.  · When lying down to sleep or rest, keep your head raised (elevated) with two or more pillows.  · Avoid excessive neck movements.  Activity  · Do not drive or use machinery if you feel dizzy.  · After doing the Epley maneuver, return to your normal activities as told by your health care provider. Ask your health care provider what activities are safe for you.  General instructions  · Drink enough fluid to keep your urine pale yellow.  · Do not drink alcohol.  · Take over-the-counter and prescription medicines only as told by your health care provider.  · Keep all follow-up visits as told by your health care provider. This is important.  Preventing vertigo symptoms  Ask your health care provider if there is anything you should do at home to prevent vertigo. He or she may recommend that you:  · Keep your head elevated with two or more pillows while you sleep.  · Do not sleep on the side of your affected ear.  · Get up slowly from bed.  · Avoid sudden movements during the day.  · Avoid extreme head positions or movement, such as looking up or bending over.  Contact a health care provider if:  · Your vertigo gets worse.  · You have other symptoms, including:  ? Nausea.  ? Vomiting.  ? Headache.  Get help right away if you:  · Have vision changes.  · Have a headache or neck pain that is severe or getting worse.  · Cannot stop vomiting.  · Have new numbness or weakness in any part of your body.  Summary  · Vertigo is the feeling that you or your surroundings  are moving when they are not.  · The Epley maneuver is an exercise that relieves symptoms of vertigo.  · If the Epley maneuver is done correctly, it is considered safe and relieves vertigo quickly.  This information is not intended to replace advice given to you by your health care provider. Make sure you discuss any questions you have with your health care provider.  Document Revised: 10/14/2020 Document Reviewed: 10/14/2020  Elsevier Patient Education © 2021 [employer]    Vertigo  Vertigo is the feeling that you or the things around you are moving when they are not. This feeling can come and go at any time. Vertigo often goes away on its own. This condition can be dangerous if it happens when you are doing activities like driving or working with machines.  Your doctor will do tests to find the cause of your vertigo. These tests will also help your doctor decide on the best treatment for you.  Follow these instructions at home:  Eating and drinking         · Drink enough fluid to keep your pee (urine) pale yellow.  · Do not drink alcohol.  Activity  · Return to your normal activities as told by your doctor. Ask your doctor what activities are safe for you.  · In the morning, first sit up on the side of the bed. When you feel okay, stand slowly while you hold onto something until you know that your balance is fine.  · Move slowly. Avoid sudden body or head movements or certain positions, as told by your doctor.  · Use a cane if you have trouble standing or walking.  · Sit down right away if you feel dizzy.  · Avoid doing any tasks or activities that can cause danger to you or others if you get dizzy.  · Avoid bending down if you feel dizzy. Place items in your home so that they are easy for you to reach without leaning over.  · Do not drive or use heavy machinery if you feel dizzy.  General instructions  · Take over-the-counter and prescription medicines only as told by your doctor.  · Keep all follow-up visits  as told by your doctor. This is important.  Contact a doctor if:  · Your medicine does not help your vertigo.  · You have a fever.  · Your problems get worse or you have new symptoms.  · Your family or friends see changes in your behavior.  · The feeling of being sick to your stomach gets worse.  · Your vomiting gets worse.  · You lose feeling (have numbness) in part of your body.  · You feel prickling and tingling in a part of your body.  Get help right away if:  · You have trouble moving or talking.  · You are always dizzy.  · You pass out (faint).  · You get very bad headaches.  · You feel weak in your hands, arms, or legs.  · You have changes in your hearing.  · You have changes in how you see (vision).  · You get a stiff neck.  · Bright light starts to bother you.  Summary  · Vertigo is the feeling that you or the things around you are moving when they are not.  · Your doctor will do tests to find the cause of your vertigo.  · You may be told to avoid some tasks, positions, or movements.  · Contact a doctor if your medicine is not helping, or if you have a fever, new symptoms, or a change in behavior.  · Get help right away if you get very bad headaches, or if you have changes in how you speak, hear, or see.  This information is not intended to replace advice given to you by your health care provider. Make sure you discuss any questions you have with your health care provider.  Document Revised: 11/11/2019 Document Reviewed: 11/11/2019  [employer] [facility] Patient Education © 2021 [employer].    Benign Positional Vertigo  Vertigo is the feeling that you or your surroundings are moving when they are not. Benign positional vertigo is the most common form of vertigo. This is usually a harmless condition (benign). This condition is positional. This means that symptoms are triggered by certain movements and positions.  This condition can be dangerous if it occurs while you are doing something that could cause harm to you or  others. This includes activities such as driving or operating machinery.  What are the causes?  The inner ear has fluid-filled canals that help your brain sense movement and balance. When the fluid moves, the brain receives messages about your body's position.  With benign positional vertigo, crystals in the inner ear break free and disturb the inner ear area. This causes your brain to receive confusing messages about your body's position.  What increases the risk?  You are more likely to develop this condition if:  · You are a woman.  · You are 50 years of age or older.  · You have recently had a head injury.  · You have an inner ear disease.  What are the signs or symptoms?  Symptoms of this condition usually happen when you move your head or your eyes in different directions. Symptoms may start suddenly, and usually last for less than a minute. They include:  · Loss of balance and falling.  · Feeling like you are spinning or moving.  · Feeling like your surroundings are spinning or moving.  · Nausea and vomiting.  · Blurred vision.  · Dizziness.  · Involuntary eye movement (nystagmus).  Symptoms can be mild and cause only minor problems, or they can be severe and interfere with daily life. Episodes of benign positional vertigo may return (recur) over time. Symptoms may improve over time.  How is this diagnosed?  This condition may be diagnosed based on:  · Your medical history.  · Physical exam of the head, neck, and ears.  · Positional tests to check for or stimulate vertigo. You may be asked to turn your head and change positions, such as going from sitting to lying down. A health care provider will watch for symptoms of vertigo.  You may be referred to a health care provider who specializes in ear, nose, and throat problems (ENT, or otolaryngologist) or a provider who specializes in disorders of the nervous system (neurologist).  How is this treated?    This condition may be treated in a session in which your  health care provider moves your head in specific positions to help the displaced crystals in your inner ear move. Treatment for this condition may take several sessions. Surgery may be needed in severe cases, but this is rare.  In some cases, benign positional vertigo may resolve on its own in 2-4 weeks.  Follow these instructions at home:  Safety  · Move slowly. Avoid sudden body or head movements or certain positions, as told by your health care provider.  · Avoid driving until your health care provider says it is safe for you to do so.  · Avoid operating heavy machinery until your health care provider says it is safe for you to do so.  · Avoid doing any tasks that would be dangerous to you or others if vertigo occurs.  · If you have trouble walking or keeping your balance, try using a cane for stability. If you feel dizzy or unstable, sit down right away.  · Return to your normal activities as told by your health care provider. Ask your health care provider what activities are safe for you.  General instructions  · Take over-the-counter and prescription medicines only as told by your health care provider.  · Drink enough fluid to keep your urine pale yellow.  · Keep all follow-up visits as told by your health care provider. This is important.  Contact a health care provider if:  · You have a fever.  · Your condition gets worse or you develop new symptoms.  · Your family or friends notice any behavioral changes.  · You have nausea or vomiting that gets worse.  · You have numbness or a prickling and tingling sensation.  Get help right away if you:  · Have difficulty speaking or moving.  · Are always dizzy.  · Faint.  · Develop severe headaches.  · Have weakness in your legs or arms.  · Have changes in your hearing or vision.  · Develop a stiff neck.  · Develop sensitivity to light.  Summary  · Vertigo is the feeling that you or your surroundings are moving when they are not. Benign positional vertigo is the most  common form of vertigo.  · This condition is caused by crystals in the inner ear that become displaced. This causes a disturbance in an area of the inner ear that helps your brain sense movement and balance.  · Symptoms include loss of balance and falling, feeling that you or your surroundings are moving, nausea and vomiting, and blurred vision.  · This condition can be diagnosed based on symptoms, a physical exam, and positional tests.  · Follow safety instructions as told by your health care provider. You will also be told when to contact your health care provider in case of problems.  This information is not intended to replace advice given to you by your health care provider. Make sure you discuss any questions you have with your health care provider.  Document Revised: 11/10/2020 Document Reviewed: 05/29/2019  Elsevier Patient Education © 2021 [employer]    Dizziness  Dizziness is a common problem. It makes you feel unsteady or light-headed. You may feel like you are about to pass out (faint). Dizziness can lead to getting hurt if you stumble or fall. Dizziness can be caused by many things, including:  · Medicines.  · Not having enough water in your body (dehydration).  · Illness.  Follow these instructions at home:  Eating and drinking    · Drink enough fluid to keep your pee (urine) clear or pale yellow. This helps to keep you from getting dehydrated. Try to drink more clear fluids, such as water.  · Do not drink alcohol.  · Limit how much caffeine you drink or eat, if your doctor tells you to do that.  · Limit how much salt (sodium) you drink or eat, if your doctor tells you to do that.    Activity    · Avoid making quick movements.  ? When you stand up from sitting in a chair, steady yourself until you feel okay.  ? In the morning, first sit up on the side of the bed. When you feel okay, stand slowly while you hold onto something. Do this until you know that your balance is fine.  · If you need to stand  in one place for a long time, move your legs often. Tighten and relax the muscles in your legs while you are standing.  · Do not drive or use heavy machinery if you feel dizzy.  · Avoid bending down if you feel dizzy. Place items in your home so you can reach them easily without leaning over.    Lifestyle  · Do not use any products that contain nicotine or tobacco, such as cigarettes and e-cigarettes. If you need help quitting, ask your doctor.  · Try to lower your stress level. You can do this by using methods such as yoga or meditation. Talk with your doctor if you need help.  General instructions  · Watch your dizziness for any changes.  · Take over-the-counter and prescription medicines only as told by your doctor. Talk with your doctor if you think that you are dizzy because of a medicine that you are taking.  · Tell a friend or a family member that you are feeling dizzy. If he or she notices any changes in your behavior, have this person call your doctor.  · Keep all follow-up visits as told by your doctor. This is important.  Contact a doctor if:  · Your dizziness does not go away.  · Your dizziness or light-headedness gets worse.  · You feel sick to your stomach (nauseous).  · You have trouble hearing.  · You have new symptoms.  · You are unsteady on your feet.  · You feel like the room is spinning.  Get help right away if:  · You throw up (vomit) or have watery poop (diarrhea), and you cannot eat or drink anything.  · You have trouble:  ? Talking.  ? Walking.  ? Swallowing.  ? Using your arms, hands, or legs.  · You feel generally weak.  · You are not thinking clearly, or you have trouble forming sentences. A friend or family member may notice this.  · You have:  ? Chest pain.  ? Pain in your belly (abdomen).  ? Shortness of breath.  ? Sweating.  · Your vision changes.  · You are bleeding.  · You have a very bad headache.  · You have neck pain or a stiff neck.  · You have a fever.  These symptoms may be an  emergency. Do not wait to see if the symptoms will go away. Get medical help right away. Call your local emergency services (911 in the [place] Do not drive yourself to the hospital.  Summary  · Dizziness makes you feel unsteady or light-headed. You may feel like you are about to pass out (faint).  · Drink enough fluid to keep your pee (urine) clear or pale yellow. Do not drink alcohol.  · Avoid making quick movements if you feel dizzy.  · Watch your dizziness for any changes.  This information is not intended to replace advice given to you by your health care provider. Make sure you discuss any questions you have with your health care provider.  Document Revised: 12/21/2018 Document Reviewed: 01/04/2018  [facility] Patient Education © 2021 [employer]    Diabetes Mellitus and Nutrition, Adult  When you have diabetes, or diabetes mellitus, it is very important to have healthy eating habits because your blood sugar (glucose) levels are greatly affected by what you eat and drink. Eating healthy foods in the right amounts, at about the same times every day, can help you:  · Control your blood glucose.  · Lower your risk of heart disease.  · Improve your blood pressure.  · Reach or maintain a healthy weight.  What can affect my meal plan?  Every person with diabetes is different, and each person has different needs for a meal plan. Your health care provider may recommend that you work with a dietitian to make a meal plan that is best for you. Your meal plan may vary depending on factors such as:  · The calories you need.  · The medicines you take.  · Your weight.  · Your blood glucose, blood pressure, and cholesterol levels.  · Your activity level.  · Other health conditions you have, such as heart or kidney disease.  How do carbohydrates affect me?  Carbohydrates, also called carbs, affect your blood glucose level more than any other type of food. Eating carbs naturally raises the amount of glucose in your blood. Carb  "counting is a method for keeping track of how many carbs you eat. Counting carbs is important to keep your blood glucose at a healthy level, especially if you use insulin or take certain oral diabetes medicines.  It is important to know how many carbs you can safely have in each meal. This is different for every person. Your dietitian can help you calculate how many carbs you should have at each meal and for each snack.  How does alcohol affect me?  Alcohol can cause a sudden decrease in blood glucose (hypoglycemia), especially if you use insulin or take certain oral diabetes medicines. Hypoglycemia can be a life-threatening condition. Symptoms of hypoglycemia, such as sleepiness, dizziness, and confusion, are similar to symptoms of having too much alcohol.  · Do not drink alcohol if:  ? Your health care provider tells you not to drink.  ? You are pregnant, may be pregnant, or are planning to become pregnant.  · If you drink alcohol:  ? Do not drink on an empty stomach.  ? Limit how much you use to:  § 0-1 drink a day for women.  § 0-2 drinks a day for men.  ? Be aware of how much alcohol is in your drink. In the [place] one drink equals one 12 oz bottle of beer (355 mL), one 5 oz glass of wine (148 mL), or one 1½ oz glass of hard liquor (44 mL).  ? Keep yourself hydrated with water, diet soda, or unsweetened iced tea.  § Keep in mind that regular soda, juice, and other mixers may contain a lot of sugar and must be counted as carbs.  What are tips for following this plan?    Reading food labels  · Start by checking the serving size on the \"Nutrition Facts\" label of packaged foods and drinks. The amount of calories, carbs, fats, and other nutrients listed on the label is based on one serving of the item. Many items contain more than one serving per package.  · Check the total grams (g) of carbs in one serving. You can calculate the number of servings of carbs in one serving by dividing the total carbs by 15. For " "example, if a food has 30 g of total carbs per serving, it would be equal to 2 servings of carbs.  · Check the number of grams (g) of saturated fats and trans fats in one serving. Choose foods that have a low amount or none of these fats.  · Check the number of milligrams (mg) of salt (sodium) in one serving. Most people should limit total sodium intake to less than 2,300 mg per day.  · Always check the nutrition information of foods labeled as \"low-fat\" or \"nonfat.\" These foods may be higher in added sugar or refined carbs and should be avoided.  · Talk to your dietitian to identify your daily goals for nutrients listed on the label.  Shopping  · Avoid buying canned, pre-made, or processed foods. These foods tend to be high in fat, sodium, and added sugar.  · Shop around the outside edge of the grocery store. This is where you will most often find fresh fruits and vegetables, bulk grains, fresh meats, and fresh dairy.  Cooking  · Use low-heat cooking methods, such as baking, instead of high-heat cooking methods like deep frying.  · Cook using healthy oils, such as olive, canola, or sunflower oil.  · Avoid cooking with butter, cream, or high-fat meats.  Meal planning  · Eat meals and snacks regularly, preferably at the same times every day. Avoid going long periods of time without eating.  · Eat foods that are high in fiber, such as fresh fruits, vegetables, beans, and whole grains. Talk with your dietitian about how many servings of carbs you can eat at each meal.  · Eat 4-6 oz (112-168 g) of lean protein each day, such as lean meat, chicken, fish, eggs, or tofu. One ounce (oz) of lean protein is equal to:  ? 1 oz (28 g) of meat, chicken, or fish.  ? 1 egg.  ? ¼ cup (62 g) of tofu.  · Eat some foods each day that contain healthy fats, such as avocado, nuts, seeds, and fish.  What foods should I eat?  Fruits  Berries. Apples. Oranges. Peaches. Apricots. Plums. Grapes. [place]. Papaya. Pomegranate. Kiwi. " Cherries.  Vegetables  Lettuce. Spinach. Leafy greens, including kale, chard, russ greens, and mustard greens. Beets. Cauliflower. Cabbage. Broccoli. Carrots. Green beans. Tomatoes. Peppers. Onions. Cucumbers. [place] sprouts.  Grains  Whole grains, such as whole-wheat or whole-grain bread, crackers, tortillas, cereal, and pasta. Unsweetened oatmeal. Quinoa. Brown or wild rice.  Meats and other proteins  Seafood. Poultry without skin. Lean cuts of poultry and beef. Tofu. Nuts. Seeds.  Dairy  Low-fat or fat-free dairy products such as milk, yogurt, and cheese.  The items listed above may not be a complete list of foods and beverages you can eat. Contact a dietitian for more information.  What foods should I avoid?  Fruits  Fruits canned with syrup.  Vegetables  Canned vegetables. Frozen vegetables with butter or cream sauce.  Grains  Refined white flour and flour products such as bread, pasta, snack foods, and cereals. Avoid all processed foods.  Meats and other proteins  Fatty cuts of meat. Poultry with skin. Breaded or fried meats. Processed meat. Avoid saturated fats.  Dairy  Full-fat yogurt, cheese, or milk.  Beverages  Sweetened drinks, such as soda or iced tea.  The items listed above may not be a complete list of foods and beverages you should avoid. Contact a dietitian for more information.  Questions to ask a health care provider  · Do I need to meet with a diabetes educator?  · Do I need to meet with a dietitian?  · What number can I call if I have questions?  · When are the best times to check my blood glucose?  Where to find more information:  · American Diabetes Association: diabetes.org  · Academy of Nutrition and Dietetics: www.eatright.org  · National [place] of Diabetes and Digestive and Kidney Diseases: www.niddk.nih.gov  · Association of Diabetes Care and Education Specialists: www.diabeteseducator.org  Summary  · It is important to have healthy eating habits because your blood sugar  (glucose) levels are greatly affected by what you eat and drink.  · A healthy meal plan will help you control your blood glucose and maintain a healthy lifestyle.  · Your health care provider may recommend that you work with a dietitian to make a meal plan that is best for you.  · Keep in mind that carbohydrates (carbs) and alcohol have immediate effects on your blood glucose levels. It is important to count carbs and to use alcohol carefully.  This information is not intended to replace advice given to you by your health care provider. Make sure you discuss any questions you have with your health care provider.  Document Revised: 11/24/2020 Document Reviewed: 11/24/2020  [employer] Patient Education © 2021 [employer]    Healthy Eating  Following a healthy eating pattern may help you to achieve and maintain a healthy body weight, reduce the risk of chronic disease, and live a long and productive life. It is important to follow a healthy eating pattern at an appropriate calorie level for your body. Your nutritional needs should be met primarily through food by choosing a variety of nutrient-rich foods.  What are tips for following this plan?  Reading food labels  · Read labels and choose the following:  ? Reduced or low sodium.  ? Juices with 100% fruit juice.  ? Foods with low saturated fats and high polyunsaturated and monounsaturated fats.  ? Foods with whole grains, such as whole wheat, cracked wheat, brown rice, and wild rice.  ? Whole grains that are fortified with folic acid. This is recommended for women who are pregnant or who want to become pregnant.  · Read labels and avoid the following:  ? Foods with a lot of added sugars. These include foods that contain brown sugar, corn sweetener, corn syrup, dextrose, fructose, glucose, high-fructose corn syrup, honey, invert sugar, lactose, malt syrup, maltose, molasses, raw sugar, sucrose, trehalose, or turbinado sugar.  § Do not eat more than the following  "amounts of added sugar per day:  § 6 teaspoons (25 g) for women.  § 9 teaspoons (38 g) for men.  ? Foods that contain processed or refined starches and grains.  ? Refined grain products, such as white flour, degermed cornmeal, white bread, and white rice.  Shopping  · Choose nutrient-rich snacks, such as vegetables, whole fruits, and nuts. Avoid high-calorie and high-sugar snacks, such as potato chips, fruit snacks, and candy.  · Use oil-based dressings and spreads on foods instead of solid fats such as butter, stick margarine, or cream cheese.  · Limit pre-made sauces, mixes, and \"instant\" products such as flavored rice, instant noodles, and ready-made pasta.  · Try more plant-protein sources, such as tofu, tempeh, black beans, edamame, lentils, nuts, and seeds.  · Explore eating plans such as the Mediterranean diet or vegetarian diet.  Cooking  · Use oil to sauté or stir-up foods instead of solid fats such as butter, stick margarine, or lard.  · Try baking, boiling, grilling, or broiling instead of frying.  · Remove the fatty part of meats before cooking.  · Steam vegetables in water or broth.  Meal planning    · At meals, imagine dividing your plate into fourths:  ? One-half of your plate is fruits and vegetables.  ? One-fourth of your plate is whole grains.  ? One-fourth of your plate is protein, especially lean meats, poultry, eggs, tofu, beans, or nuts.  · Include low-fat dairy as part of your daily diet.    Lifestyle  · Choose healthy options in all settings, including home, work, school, restaurants, or stores.  · Prepare your food safely:  ? Wash your hands after handling raw meats.  ? Keep food preparation surfaces clean by regularly washing with hot, soapy water.  ? Keep raw meats separate from ready-to-eat foods, such as fruits and vegetables.  ? , meat, poultry, and eggs to the recommended internal temperature.  ? Store foods at safe temperatures. In general:  § Keep cold foods at 40°F " (4.4°C) or below.  § Keep hot foods at 140°F (60°C) or above.  § Keep your freezer at 0°F (-17.8°C) or below.  § Foods are no longer safe to eat when they have been between the temperatures of 40°-140°F (4.4-60°C) for more than 2 hours.  What foods should I eat?  Fruits  Aim to eat 2 cup-equivalents of fresh, canned (in natural juice), or frozen fruits each day. Examples of 1 cup-equivalent of fruit include 1 small apple, 8 large strawberries, 1 cup canned fruit, ½ cup dried fruit, or 1 cup 100% juice.  Vegetables  Aim to eat 2½-3 cup-equivalents of fresh and frozen vegetables each day, including different varieties and colors. Examples of 1 cup-equivalent of vegetables include 2 medium carrots, 2 cups raw, leafy greens, 1 cup chopped vegetable (raw or cooked), or 1 medium baked potato.  Grains  Aim to eat 6 ounce-equivalents of whole grains each day. Examples of 1 ounce-equivalent of grains include 1 slice of bread, 1 cup ready-to-eat cereal, 3 cups popcorn, or ½ cup cooked rice, pasta, or cereal.  Meats and other proteins  Aim to eat 5-6 ounce-equivalents of protein each day. Examples of 1 ounce-equivalent of protein include 1 egg, 1/2 cup nuts or seeds, or 1 tablespoon (16 g) peanut butter. A cut of meat or fish that is the size of a deck of cards is about 3-4 ounce-equivalents.  · Of the protein you eat each week, try to have at least 8 ounces come from seafood. This includes salmon, trout, herring, and anchovies.  Dairy  Aim to eat 3 cup-equivalents of fat-free or low-fat dairy each day. Examples of 1 cup-equivalent of dairy include 1 cup (240 mL) milk, 8 ounces (250 g) yogurt, 1½ ounces (44 g) natural cheese, or 1 cup (240 mL) fortified soy milk.  Fats and oils  · Aim for about 5 teaspoons (21 g) per day. Choose monounsaturated fats, such as canola and olive oils, avocados, peanut butter, and most nuts, or polyunsaturated fats, such as sunflower, corn, and soybean oils, walnuts, pine nuts, sesame seeds,  sunflower seeds, and flaxseed.  Beverages  · Aim for six 8-oz glasses of water per day. Limit coffee to three to five 8-oz cups per day.  · Limit caffeinated beverages that have added calories, such as soda and energy drinks.  · Limit alcohol intake to no more than 1 drink a day for nonpregnant women and 2 drinks a day for men. One drink equals 12 oz of beer (355 mL), 5 oz of wine (148 mL), or 1½ oz of hard liquor (44 mL).  Seasoning and other foods  · Avoid adding excess amounts of salt to your foods. Try flavoring foods with herbs and spices instead of salt.  · Avoid adding sugar to foods.  · Try using oil-based dressings, sauces, and spreads instead of solid fats.  This information is based on general [place] nutrition guidelines. For more information, visit choosemyplate.gov. Exact amounts may vary based on your nutrition needs.  Summary  · A healthy eating plan may help you to maintain a healthy weight, reduce the risk of chronic diseases, and stay active throughout your life.  · Plan your meals. Make sure you eat the right portions of a variety of nutrient-rich foods.  · Try baking, boiling, grilling, or broiling instead of frying.  · Choose healthy options in all settings, including home, work, school, restaurants, or stores.  This information is not intended to replace advice given to you by your health care provider. Make sure you discuss any questions you have with your health care provider.  Document Revised: 04/01/2019 Document Reviewed: 04/01/2019  [employer] Patient Education © 2021 [employer]    Heart-Healthy Eating Plan  Heart-healthy meal planning includes:  · Eating less unhealthy fats.  · Eating more healthy fats.  · Making other changes in your diet.  Talk with your doctor or a diet specialist (dietitian) to create an eating plan that is right for you.  What is my plan?  Your doctor may recommend an eating plan that includes:  · Total fat: ______% or less of total calories a day.  · Saturated fat:  ______% or less of total calories a day.  · Cholesterol: less than _________mg a day.  What are tips for following this plan?  Cooking  Avoid frying your food. Try to bake, boil, grill, or broil it instead. You can also reduce fat by:  · Removing the skin from poultry.  · Removing all visible fats from meats.  · Steaming vegetables in water or broth.  Meal planning    · At meals, divide your plate into four equal parts:  ? Fill one-half of your plate with vegetables and green salads.  ? Fill one-fourth of your plate with whole grains.  ? Fill one-fourth of your plate with lean protein foods.  · Eat 4-5 servings of vegetables per day. A serving of vegetables is:  ? 1 cup of raw or cooked vegetables.  ? 2 cups of raw leafy greens.  · Eat 4-5 servings of fruit per day. A serving of fruit is:  ? 1 medium whole fruit.  ? ¼ cup of dried fruit.  ? ½ cup of fresh, frozen, or canned fruit.  ? ½ cup of 100% fruit juice.  · Eat more foods that have soluble fiber. These are apples, broccoli, carrots, beans, peas, and barley. Try to get 20-30 g of fiber per day.  · Eat 4-5 servings of nuts, legumes, and seeds per week:  ? 1 serving of dried beans or legumes equals ½ cup after being cooked.  ? 1 serving of nuts is ¼ cup.  ? 1 serving of seeds equals 1 tablespoon.    General information  · Eat more home-cooked food. Eat less restaurant, buffet, and fast food.  · Limit or avoid alcohol.  · Limit foods that are high in starch and sugar.  · Avoid fried foods.  · Lose weight if you are overweight.  · Keep track of how much salt (sodium) you eat. This is important if you have high blood pressure. Ask your doctor to tell you more about this.  · Try to add vegetarian meals each week.  Fats  · Choose healthy fats. These include olive oil and canola oil, flaxseeds, walnuts, almonds, and seeds.  · Eat more omega-3 fats. These include salmon, mackerel, sardines, tuna, flaxseed oil, and ground flaxseeds. Try to eat fish at least 2 times each  week.  · Check food labels. Avoid foods with trans fats or high amounts of saturated fat.  · Limit saturated fats.  ? These are often found in animal products, such as meats, butter, and cream.  ? These are also found in plant foods, such as palm oil, palm kernel oil, and coconut oil.  · Avoid foods with partially hydrogenated oils in them. These have trans fats. Examples are stick margarine, some tub margarines, cookies, crackers, and other baked goods.  What foods can I eat?  Fruits  All fresh, canned (in natural juice), or frozen fruits.  Vegetables  Fresh or frozen vegetables (raw, steamed, roasted, or grilled). Green salads.  Grains  Most grains. Choose whole wheat and whole grains most of the time. Rice and pasta, including brown rice and pastas made with whole wheat.  Meats and other proteins  Lean, well-trimmed beef, veal, pork, and lamb. Chicken and turkey without skin. All fish and shellfish. Wild duck, rabbit, pheasant, and venison. Egg whites or low-cholesterol egg substitutes. Dried beans, peas, lentils, and tofu. Seeds and most nuts.  Dairy  Low-fat or nonfat cheeses, including ricotta and mozzarella. Skim or 1% milk that is liquid, powdered, or evaporated. Buttermilk that is made with low-fat milk. Nonfat or low-fat yogurt.  Fats and oils  Non-hydrogenated (trans-free) margarines. Vegetable oils, including soybean, sesame, sunflower, olive, peanut, safflower, corn, canola, and cottonseed. Salad dressings or mayonnaise made with a vegetable oil.  Beverages  Mineral water. Coffee and tea. Diet carbonated beverages.  Sweets and desserts  Sherbet, gelatin, and fruit ice. Small amounts of dark chocolate.  Limit all sweets and desserts.  Seasonings and condiments  All seasonings and condiments.  The items listed above may not be a complete list of foods and drinks you can eat. Contact a dietitian for more options.  What foods should I avoid?  Fruits  Canned fruit in heavy syrup. Fruit in cream or butter  sauce. Fried fruit. Limit coconut.  Vegetables  Vegetables cooked in cheese, cream, or butter sauce. Fried vegetables.  Grains  Breads that are made with saturated or trans fats, oils, or whole milk. Croissants. Sweet rolls. Donuts. High-fat crackers, such as cheese crackers.  Meats and other proteins  Fatty meats, such as hot dogs, ribs, sausage, moya, rib-eye roast or steak. High-fat deli meats, such as salami and bologna. Caviar. Domestic duck and goose. Organ meats, such as liver.  Dairy  Cream, sour cream, cream cheese, and creamed cottage cheese. Whole-milk cheeses. Whole or 2% milk that is liquid, evaporated, or condensed. Whole buttermilk. Cream sauce or high-fat cheese sauce. Yogurt that is made from whole milk.  Fats and oils  Meat fat, or shortening. Cocoa butter, hydrogenated oils, palm oil, coconut oil, palm kernel oil. Solid fats and shortenings, including moya fat, salt pork, lard, and butter. Nondairy cream substitutes. Salad dressings with cheese or sour cream.  Beverages  Regular sodas and juice drinks with added sugar.  Sweets and desserts  Frosting. Pudding. Cookies. Cakes. Pies. Milk chocolate or white chocolate. Buttered syrups. Full-fat ice cream or ice cream drinks.  The items listed above may not be a complete list of foods and drinks to avoid. Contact a dietitian for more information.  Summary  · Heart-healthy meal planning includes eating less unhealthy fats, eating more healthy fats, and making other changes in your diet.  · Eat a balanced diet. This includes fruits and vegetables, low-fat or nonfat dairy, lean protein, nuts and legumes, whole grains, and heart-healthy oils and fats.  This information is not intended to replace advice given to you by your health care provider. Make sure you discuss any questions you have with your health care provider.  Document Revised: 02/21/2019 Document Reviewed: 01/25/2019  [employer] Patient Education © 2021 [employer]

## 2021-10-04 NOTE — ASSESSMENT & PLAN NOTE
Increase fluid intake to 8-10 glasses of water per day.   Regular intervals of food-- every 3-4 hours daily.   Referral placed to physical therapy for evaluation of possible vertigo.

## 2021-10-04 NOTE — PROGRESS NOTES
"Chief Complaint  Dizziness (Lightheaded ) and Sweating (getting sweating as well with the dizziness)    Subjective          Jana Page presents to [facility]  History of Present Illness   This is a 70 y/o female presenting to office with complaints of dizziness with sweating episodes. Patient reports this has been occurring over the previous year. Patient reports eating at regular intervals. Patient reports these episodes occur mostly in the afternoons and occasionally in the mornings. Patient reports congruent headache with the dizziness and sweatiness. Patient reports 2 cups of water intake per day. Patient reports 2-3 glasses of iced tea.     Patient reports dizziness can be specifically triggered by motion as well. Patient recently [place] merry[place] farm and had episode after getting off of the ride. Patient reports when these episodes occur the dizziness usually stops within 15 minutes.     Patient noted last EKG showed NSR on 3/7/2017.      Adult ECG Report     Name: Jana Page   Age: 71 y.o.   Gender: female       Rate: 55   Rhythm: sinus rhythm with first degree av block     OR Interval: 146/240 ms   QRS Duration: 104 ms   QTc: 424/405 ms   Conduction Disturbances: first-degree A-V block (prolonged P-R interval)   Other Abnormalities: none     Narrative Interpretation: sinus rhythm first degree AV block with intra-atrial conduction delay        Objective   Vital Signs:   /70   Pulse 58   Temp 98.4 °F (36.9 °C)   Ht 167.6 cm (65.98\")   Wt 74.4 kg (164 lb)   SpO2 98%   BMI 26.48 kg/m²     Physical Exam  Vitals and nursing note reviewed.   Constitutional:       Appearance: Normal appearance. She is normal weight.   HENT:      Head: Normocephalic and atraumatic.   Eyes:      Conjunctiva/sclera: Conjunctivae normal.      Pupils: Pupils are equal, round, and reactive to light.   Cardiovascular:      Rate and Rhythm: Normal rate and regular rhythm.     "  Pulses: Normal pulses.      Heart sounds: Normal heart sounds. No murmur heard.   No friction rub. No gallop.    Pulmonary:      Effort: Pulmonary effort is normal. No respiratory distress.      Breath sounds: Normal breath sounds. No stridor. No wheezing, rhonchi or rales.   Abdominal:      General: There is no distension.      Palpations: Abdomen is soft.   Musculoskeletal:         General: Normal range of motion.      Cervical back: Normal range of motion and neck supple.   Skin:     General: Skin is warm and dry.      Capillary Refill: Capillary refill takes less than 2 seconds.   Neurological:      Mental Status: She is alert and oriented to person, place, and time. Mental status is at baseline.   Psychiatric:         Mood and Affect: Mood normal.         Behavior: Behavior normal.         Thought Content: Thought content normal.         Judgment: Judgment normal.        Result Review :   The following data was reviewed by: RACHELE Walker on 10/04/2021:  Common labs    Common Labsle 9/17/21 9/17/21 9/17/21 9/17/21    1230 1230 1230 1230   Glucose  103 (A)     BUN  15     Creatinine  0.76     eGFR Non  Am  75     eGFR African Am  91     Sodium  139     Potassium  3.8     Chloride  103     Calcium  9.8     Total Protein  6.7     Albumin  5.10     Total Bilirubin  0.7     Alkaline Phosphatase  86     AST (SGOT)  30     ALT (SGPT)  33     WBC 9.27      Hemoglobin 14.7      Hematocrit 43.0      Platelets 293      Total Cholesterol   113    Triglycerides   100    HDL Cholesterol   44    LDL Cholesterol    50    Hemoglobin A1C    5.60   (A) Abnormal value       Comments are available for some flowsheets but are not being displayed.                    Assessment and Plan    Diagnoses and all orders for this visit:    1. Dizziness (Primary)  Assessment & Plan:  Increase fluid intake to 8-10 glasses of water per day.   Regular intervals of food-- every 3-4 hours daily.   Referral placed to physical therapy  for evaluation of possible vertigo.       Orders:  -     ECG 12 Lead  -     Ambulatory Referral to Cardiology  -     Ambulatory Referral to Physical Therapy Vestibular  -     Accu-Chek FastClix Lancets misc; DX. Ell.9  Dispense: 100 each; Refill: 2    2. First degree AV block  Assessment & Plan:  Referral placed to cardiology for evaluation r/t changes in EKG.       Orders:  -     Ambulatory Referral to Cardiology    I spent 40 minutes caring for Jana on this date of service. This time includes time spent by me in the following activities:preparing for the visit, reviewing tests, obtaining and/or reviewing a separately obtained history, performing a medically appropriate examination and/or evaluation , counseling and educating the patient/family/caregiver, ordering medications, tests, or procedures, documenting information in the medical record and care coordination  Follow Up   No follow-ups on file.  Patient was given instructions and counseling regarding her condition or for health maintenance advice. Please see specific information pulled into the AVS if appropriate.

## 2021-10-06 ENCOUNTER — HOSPITAL ENCOUNTER (OUTPATIENT)
Dept: PHYSICAL THERAPY | Facility: HOSPITAL | Age: 71
Setting detail: THERAPIES SERIES
Discharge: HOME | End: 2021-10-06

## 2021-10-06 ENCOUNTER — TELEPHONE (OUTPATIENT)
Dept: INTERNAL MEDICINE | Facility: CLINIC | Age: 71
End: 2021-10-06

## 2021-10-06 ENCOUNTER — TELEPHONE (OUTPATIENT)
Dept: GASTROENTEROLOGY | Facility: CLINIC | Age: 71
End: 2021-10-06

## 2021-10-06 DIAGNOSIS — R42 DIZZINESS: Primary | ICD-10-CM

## 2021-10-06 DIAGNOSIS — R73.03 PREDIABETES: ICD-10-CM

## 2021-10-06 DIAGNOSIS — R42 DIZZINESS: ICD-10-CM

## 2021-10-06 DIAGNOSIS — E11.9 TYPE 2 DIABETES MELLITUS WITHOUT COMPLICATION, WITHOUT LONG-TERM CURRENT USE OF INSULIN (HCC): Primary | ICD-10-CM

## 2021-10-06 DIAGNOSIS — H83.2X9 VESTIBULAR HYPOFUNCTION, UNSPECIFIED LATERALITY: ICD-10-CM

## 2021-10-06 PROCEDURE — 97162 PT EVAL MOD COMPLEX 30 MIN: CPT

## 2021-10-06 PROCEDURE — 97530 THERAPEUTIC ACTIVITIES: CPT

## 2021-10-06 RX ORDER — BLOOD SUGAR DIAGNOSTIC
STRIP MISCELLANEOUS
Qty: 200 EACH | Refills: 3 | Status: SHIPPED | OUTPATIENT
Start: 2021-10-06 | End: 2021-10-07 | Stop reason: SDUPTHER

## 2021-10-06 RX ORDER — LANCETS
EACH MISCELLANEOUS
Qty: 100 EACH | Refills: 2 | Status: SHIPPED | OUTPATIENT
Start: 2021-10-06 | End: 2021-10-07 | Stop reason: SDUPTHER

## 2021-10-06 NOTE — TELEPHONE ENCOUNTER
Caller: Jana Page    Relationship: Self      Medication requested (name and dosage): TEST STRIPS FOR  ACCU-CHEK  ( NOT ON MED LIST )   Pharmacy where request should be sent: [facility] [place] [address] [facility] [address]  - [phone]  - [phone] FX  [phone]    Additional details provided by patient: PATIENT IS OUT OF STRIPS     Best call back number:  [phone]   Does the patient have less than a 3 day supply:  [x] Yes  [] No    Mignon Reese Rep   10/06/21 09:57 EDT

## 2021-10-06 NOTE — TELEPHONE ENCOUNTER
Colonoscopy Screening Questionnaire has been sent to  for review                 Oa form scan in media

## 2021-10-06 NOTE — THERAPY EVALUATION
Outpatient Physical Therapy Vestibular Initial Evaluation  [facility]     Patient Name: Jana Page  : 1950  MRN: [id number]  Today's Date: 10/6/2021      Visit Date: 10/06/2021    Patient Active Problem List   Diagnosis   • Benign essential hypertension   • Disc disorder of cervical region   • Degeneration of intervertebral disc of lumbar region   • Steatosis of liver   • Fibrocystic breast changes   • Hypercholesterolemia   • Osteoarthritis of knee   • Osteopenia   • Prediabetes   • Scoliosis   • Lipoma of left lower extremity   • Health care maintenance   • Aortic atherosclerosis (HCC)   • Family history of premature coronary artery disease   • Anxiety   • Arthralgia of metacarpophalangeal joint, right   • Metabolic syndrome   • Dizziness   • First degree AV block        Past Medical History:   Diagnosis Date   • Anxiety    • Aortic atherosclerosis (HCC)    • DDD (degenerative disc disease), cervical    • Diverticulitis of large intestine    • Diverticulosis of intestine    • Fibrocystic breast changes    • Hypercholesterolemia    • Hyperlipidemia    • Hypertension    • Impaired fasting blood sugar    • Lateral epicondylitis of right elbow    • Lightheadedness 2/10/2017   • Lipoma of left lower extremity    • New daily persistent headache 3/9/2017   • Osteoarthritis of knee    • Osteopenia    • Pancreatic cyst     NO CHANGE IN  FROM    • Post-menopausal    • Scoliosis    • Steatosis of liver    • Uterine leiomyoma         Past Surgical History:   Procedure Laterality Date   • COLONOSCOPY  2011             Visit Dx:     ICD-10-CM ICD-9-CM   1. Dizziness  R42 780.4   2. Vestibular hypofunction, unspecified laterality  H83.2X9 386.50       Patient History     Row Name 10/06/21 1200             History    Chief Complaint  Dizziness  -      Date Current Problem(s) Began  10/02/21 most recent bout but has been occuring overe a year  -      Brief Description of Current  Complaint  Pt. Presents to therapy with reports of having bouts of dizziness/sweating/lightheaded. Pt. States it does not typically last very long but when it occurs she feels off-balance. When this occurs she does feel nauseous but has never actually been sick. Pt. States she has never passed out but does feel like it may occur. When these episodes occur she does have migraines/headaches. Pt. States her numbers do not indicate she is diabetic, but feels maybe the blood sugar may cause this. Pt. Did have an EKG at her visit with the MD this week which did indicate that her HR could be lower because it is not getting the correct signal at times. Pt. Does state her mother had atrial fibrillation which this is not what she has but she has been referred to a cardiologist which she will see in a couple weeks. Pt. States the dizziness she experienced is not a room spinning sensation, but this last episode she was on a merry-go-round with her grandsons and it made her very ill for several hours following.   -      Patient/Caregiver Goals  Relief from dizziness;Know what to do to help the symptoms  -      Patient seeing anyone else for problem(s)?  PCP; referral to cardiologist  -         Pain     Pain Location  -- headache  -      What Performance Factors Make the Current Problem(s) WORSE?  unknown  -      What Performance Factors Make the Current Problem(s) BETTER?  less movement  -      Is your sleep disturbed?  No  -         Fall Risk Assessment    Any falls in the past year:  No  -         Services    Prior Rehab/Home Health Experiences  No  -         Daily Activities    Primary Language  English  -      Are you able to read  Yes  -      Are you able to write  Yes  -      How does patient learn best?  Listening;Reading;Demonstration  -      Does patient have problems with the following?  None;Anxiety on anti-anxiety medication  -      Barriers to learning  None cataract surgery  -      Pt  Participated in POC and Goals  Yes  -         Safety    Are you being hurt, hit, or frightened by anyone at home or in your life?  No  -      Are you being neglected by a caregiver  No  -MH      Have you had any of the following issues with  N/A  -        User Key  (r) = Recorded By, (t) = Taken By, (c) = Cosigned By    Initials Name Provider Type     Deborah Moore, PT Physical Therapist          Vestibular Eval     Row Name 10/06/21 1200             Symptom Behavior    Type of Dizziness  Lightheadedness  -      Frequency of Dizziness  Once a day  -         Occulomotor Exam Fixation Present    Occular ROM  Abnormal L eye does not track L due to   -      Spontaneous Nystagmus  Absent  -      Gaze-induced Nystagmus  Right:;Increased beating to right;Left:;Absent  -      Smooth Pursuit  Bilateral:;Saccadic L tracks R only  -      Saccades  Right:;Overshoots;Left:;Intact  -         Vestibulo-Occular Reflex (VOR)    VOR to Fast Head Movement/Head Thrust Test  Positive right;Negative left  -      VOR Cancellation  Corrective saccades  -         Positional Testing    Positional Testing  Without infrared goggles  -      Vertebrobasilar Artery Screen - Right  Negative  -      Vertebrobasilar Artery Screen - Left  Negative  -      [place]-Hallpike Right  No nystagmus  -      [place]-Hallpike Left  No nystagmus  -      Horizontal Roll Test Right  No nystagmus  -      Horizontal Roll Test Left  No nystagmus  -         High-level Balance    High-level Balance Other  mCTSIB 100/120  -         Cervicogenic Assessment    Cervicogenic Assess  c-spine WFL though positional preference to L rotation  -        User Key  (r) = Recorded By, (t) = Taken By, (c) = Cosigned By    Initials Name Provider Type     Deborah Moore, PT Physical Therapist                      Therapy Education  Education Details: Educated on vestibular PT role and POC; discussed BPPV vs. Differential diagnosis. Likely  relation between Dwaynes Syndrome with vestibular hypofunction. Purpose of VOR exercises in relation to (+) Oculomotor exam findings; 3 components to vestibular system; Provided HEP  Given: HEP, Symptoms/condition management  Program: New  How Provided: Verbal, Demonstration, Written  Provided to: Patient  Level of Understanding: Verbalized, Teach back education performed, Demonstrated      OP Exercises     Row Name 10/06/21 1300             Total Minutes    10973 - PT Therapeutic Activity Minutes  11 including education  -MH         Exercise 1    Exercise Name 1  VOR x1  -MH      Cueing 1  Verbal  -      Sets 1  1  -      Reps 1  30sec  -MH      Additional Comments  yaw  -MH         Exercise 2    Exercise Name 2  VORx1  -MH      Cueing 2  Verbal  -      Sets 2  1  -MH      Reps 2  30sec  -MH      Additional Comments  pitch  -MH         Exercise 3    Exercise Name 3  VOR-C  -      Cueing 3  Verbal  -      Sets 3  1  -MH      Reps 3  30sec  -MH        User Key  (r) = Recorded By, (t) = Taken By, (c) = Cosigned By    Initials Name Provider Type    Deborah Aguilar, PT Physical Therapist                      PT OP Goals     Row Name 10/06/21 1300          PT Short Term Goals    STG Date to Achieve  11/06/21  -     STG 1  Pt. Will be able to tolerate VOR exercises in sitting to progress to more dynamic challenges.  -     STG 1 Progress  New  -     STG 2  Pt. will report compliance with initial HEP to improve vestibular function.  -     STG 2 Progress  New  [facility]        Long Term Goals    LTG Date to Achieve  12/05/21  -     LTG 1  Pt. will report improvement in dizziness with VOR-C movement to indicate ability to stabilize with head movement.  -     LTG 1 Progress  New  [facility]     LTG 2  Pt. Will be able to tolerate VOR exercises with dynamic balance challenges to replicate daily tasks.  -     LTG 2 Progress  New  [facility]     LTG 3  Pt. will be able to self correct head alignment to reduce L cervical  rotation.   -     LTG 3 Progress  New  -        Time Calculation    PT Goal Re-Cert Due Date  01/04/22  -       User Key  (r) = Recorded By, (t) = Taken By, (c) = Cosigned By    Initials Name Provider Type    Deborah Aguilar, PT Physical Therapist          PT Assessment/Plan     Row Name 10/06/21 1397          PT Assessment    Functional Limitations  Impaired locomotion;Performance in leisure activities;Limitation in home management;Limitations in community activities  -     Impairments  Balance;Posture;Poor body mechanics;Pain  -     Assessment Comments  Jana Page is a 71 y.o. year-old female referred to physical therapy for vestibular evaluation. She presents with a evolving clinical presentation. 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 Self scored disability measure of DHI was a 6/100 indicating mild handicap. She demonstrated sign/symptoms consistent with R vestibular hypofunction as well as positional preference to L cervical rotation and inability to track L with L eye. Signs and symptoms are consistent with referring diagnosis brian discussed possibility of cardiac component due to EKG findings and symptoms of light-headedness and sweating prior to onset of dizziness. Pt. Would like to follow up with cariology consult prior to scheduling formal vestibular rehab. She is appropriate for skilled therapy services at this time to address deficits and improve ease with ADLs.  -     Please refer to paper survey for additional self-reported information  Yes  -MH     Rehab Potential  Good  -     Patient/caregiver participated in establishment of treatment plan and goals  Yes  -     Patient would benefit from skilled therapy intervention  Yes  -MH        PT Plan    PT Frequency  1x/week;2x/week  -     Predicted  Duration of Therapy Intervention (PT)  6-8 visits  -     Planned CPT's?  PT EVAL MOD COMPLELITY: 26950;PT RE-EVAL: 81334;PT THER PROC EA 15 MIN: 21977;PT THER ACT EA 15 MIN: 79776;PT NEUROMUSC RE-EDUCATION EA 15 MIN: 60646;PT GAIT TRAINING EA 15 MIN: 96009;PT SELF CARE/HOME MGMT/TRAIN EA 15: 49984;PT CANALITH REPOSITIONIN  -     PT Plan Comments  VOR exercises? follow up re: cardiology consult  -       User Key  (r) = Recorded By, (t) = Taken By, (c) = Cosigned By    Initials Name Provider Type     Deborah Moore, PT Physical Therapist           Outcome Measure Options: Dizziness Handicap Inventory ()         Time Calculation:   Start Time: 1220  Stop Time: 1303  Time Calculation (min): 43 min  Total Timed Code Minutes- PT: 11 minute(s)  Timed Charges  01996 - PT Therapeutic Activity Minutes: 11 (including education)  Total Minutes  Timed Charges Total Minutes: 11   Total Minutes: 11   Therapy Charges for Today     Code Description Service Date Service Provider Modifiers Qty    85040491402 HC PT THERAPEUTIC ACT EA 15 MIN 10/6/2021 Deborah Moore, PT GP 1    24356276961 HC PT EVAL MOD COMPLEXITY 2 10/6/2021 Deborah Moore, PT GP 1          PT G-Codes  Outcome Measure Options: Dizziness Handicap Inventory ()         Deborah Moore PT  10/6/2021

## 2021-10-07 ENCOUNTER — TELEPHONE (OUTPATIENT)
Dept: INTERNAL MEDICINE | Facility: CLINIC | Age: 71
End: 2021-10-07

## 2021-10-07 DIAGNOSIS — R73.03 PREDIABETES: ICD-10-CM

## 2021-10-07 DIAGNOSIS — R42 DIZZINESS: ICD-10-CM

## 2021-10-07 DIAGNOSIS — E11.9 TYPE 2 DIABETES MELLITUS WITHOUT COMPLICATION, WITHOUT LONG-TERM CURRENT USE OF INSULIN (HCC): ICD-10-CM

## 2021-10-07 RX ORDER — LANCETS
EACH MISCELLANEOUS
Qty: 100 EACH | Refills: 2 | Status: SHIPPED | OUTPATIENT
Start: 2021-10-07 | End: 2021-10-18 | Stop reason: SDUPTHER

## 2021-10-07 RX ORDER — BLOOD SUGAR DIAGNOSTIC
1 STRIP MISCELLANEOUS DAILY
Qty: 200 EACH | Refills: 1 | Status: SHIPPED | OUTPATIENT
Start: 2021-10-07 | End: 2021-10-11 | Stop reason: SDUPTHER

## 2021-10-07 RX ORDER — LANCETS
EACH MISCELLANEOUS
Qty: 100 EACH | Refills: 2 | Status: SHIPPED | OUTPATIENT
Start: 2021-10-07 | End: 2021-10-07 | Stop reason: SDUPTHER

## 2021-10-07 NOTE — TELEPHONE ENCOUNTER
PATIENT CALLED STATING THAT THE PHARMACY STILL HAS NOT RECEIVED ANY REQUEST FOR TEST STRIPS.    PATIENT USES AN ACCU-CHEK TANVIR BLOOD MONITOR AND NEEDS TEST STRIPS TO GO ALONG WITH IT.    PLEASE ADVISE  [phone]     [place] [address] [place] [address] AT [facility] [address] - [phone]  - [phone] FX  [phone]

## 2021-10-07 NOTE — TELEPHONE ENCOUNTER
PATIENT CALLED AGAIN, STATING THAT WITH THE PRESCRIPTION WILL BE NEEDED TO INCLUDE DIRECTIONS ON WHEN TO USE THE GLUCOMETER, HOW MANY TIMES PER DAY SHOULD CHECK HER BLOOD SUGAR, ETC

## 2021-10-11 DIAGNOSIS — R73.03 PREDIABETES: ICD-10-CM

## 2021-10-11 DIAGNOSIS — E11.9 TYPE 2 DIABETES MELLITUS WITHOUT COMPLICATION, WITHOUT LONG-TERM CURRENT USE OF INSULIN (HCC): ICD-10-CM

## 2021-10-11 RX ORDER — BLOOD SUGAR DIAGNOSTIC
1 STRIP MISCELLANEOUS DAILY
Qty: 200 EACH | Refills: 1 | Status: SHIPPED | OUTPATIENT
Start: 2021-10-11 | End: 2021-10-13

## 2021-10-13 ENCOUNTER — TELEPHONE (OUTPATIENT)
Dept: INTERNAL MEDICINE | Facility: CLINIC | Age: 71
End: 2021-10-13

## 2021-10-13 RX ORDER — PREDNISOLONE ACETATE 10 MG/ML
SUSPENSION/ DROPS OPHTHALMIC
COMMUNITY
Start: 2021-10-08 | End: 2022-05-10

## 2021-10-13 NOTE — TELEPHONE ENCOUNTER
Caller: Jana Page    Relationship: Self    Best call back number: [phone]    Which medication are you concerned about: ACCUCHECK TANVIR TEST STRIPS    Who prescribed you this medication: DR MARQUEZ     What are your concerns: PATIENT STATES THAT THE INCORRECT TEST STRIPS WERE SENT TO THE PHARMACY AGAIN.  SHE STATES SHE NEEDS TEST STRIPS FOR THE ACCUCHECK TANVIR GLUCOSE MONITOR.   PLEASE RESEND.        [facility] [address] - [address] AT [facility] [address]  - [phone]  - [phone]   [phone]

## 2021-10-14 ENCOUNTER — CLINICAL SUPPORT (OUTPATIENT)
Dept: INTERNAL MEDICINE | Facility: CLINIC | Age: 71
End: 2021-10-14

## 2021-10-14 DIAGNOSIS — M85.80 OSTEOPENIA, UNSPECIFIED LOCATION: ICD-10-CM

## 2021-10-14 DIAGNOSIS — Z78.0 POSTMENOPAUSAL: ICD-10-CM

## 2021-10-14 PROCEDURE — 77080 DXA BONE DENSITY AXIAL: CPT | Performed by: FAMILY MEDICINE

## 2021-10-16 ENCOUNTER — PREP FOR SURGERY (OUTPATIENT)
Dept: OTHER | Facility: HOSPITAL | Age: 71
End: 2021-10-16

## 2021-10-16 DIAGNOSIS — Z12.11 SCREEN FOR COLON CANCER: Primary | ICD-10-CM

## 2021-10-18 ENCOUNTER — TELEPHONE (OUTPATIENT)
Dept: INTERNAL MEDICINE | Facility: CLINIC | Age: 71
End: 2021-10-18

## 2021-10-18 DIAGNOSIS — R42 DIZZINESS: ICD-10-CM

## 2021-10-18 RX ORDER — LANCETS
EACH MISCELLANEOUS
Qty: 100 EACH | Refills: 2 | Status: SHIPPED | OUTPATIENT
Start: 2021-10-18

## 2021-10-18 NOTE — TELEPHONE ENCOUNTER
[facility] [place] [place] [facility] [address] AT [address] - [phone] PH - [phone] FX    Relationship: Pharmacy      Medication requested (name and dosage): Accu-Chek FastClix Lancets [facility]    [facility] be [facility] [place] [place] - [address] AT [address] - [phone]  - [phone] FX     Additional details provided by patient: THEY NEED THIS RESENT WITH DIRECTIONS ON HOW OFTEN SHE IS TESTING     Best call back number: [phone]    Does the patient have less than a 3 day supply:  [] Yes  [] No    Mignon Trevizo Rep   10/18/21 13:37 EDT

## 2021-10-18 NOTE — PROGRESS NOTES
RM:________    Referral Provider: Stacy Figueroa APRN Wells, Sabino YOUSSEF MD    NEW PATIENT/ CONSULT  PREVIOUS CARDIOLOGIST:   CARDIAC TESTING:     : 1950   AGE: 71 y.o.    10/22/2021  REASON FOR VISIT/  CC:      WT: ____________ BP: __________L __________R/ HR_______________    CHEST PAIN: _____________    SOA: ____________PALPS: __________      LIGHTHEADED: ___________ FATIGUE: _______________ EDEMA______________  ALLERGIES:  Amoxicillin-pot clavulanate and Ampicillin  SMOKING HISTORY  Social History     Tobacco Use   • Smoking status: Former Smoker     Packs/day: 1.00     Years: 10.00     Pack years: 10.00     Types: Cigarettes     Quit date: 1971     Years since quittin.8   • Smokeless tobacco: Never Used   Substance Use Topics   • Alcohol use: Yes     Comment: social   • Drug use: No          CHILDREN: _______________________       CAFFEINE USE________  ALCOHOL _____________  OCCUPATION_____________  Past Surgical History:   Procedure Laterality Date   • COLONOSCOPY  2011                    FAMILY HISTORY  HEART DISEASE  CHF  DIABETES  CARDIAC ARREST  STROKE  CANCER  ANEURYSM                                                             H/O: MI_____   STROKE________   GOUT_____   ANEMIA______     CAROTID________ HIV____ CAD_______ HYPERCHOL _____    H/O: CHF _____   RF____ DM___ HTN_______PVD____THYROID DISEASE_______    PMH: GI ____   HEPATITIS ___ KIDNEY DISEASE ___ LUNG DISEASE _______     SLEEP APNEA ____ BLOOD CLOTS ____ DVT ____ VEIN STRIPPING ___________     CANCER _________________________________ CHEMO/ RADIATION__________

## 2021-10-21 ENCOUNTER — TELEPHONE (OUTPATIENT)
Dept: INTERNAL MEDICINE | Facility: CLINIC | Age: 71
End: 2021-10-21

## 2021-10-22 ENCOUNTER — OFFICE VISIT (OUTPATIENT)
Dept: CARDIOLOGY | Facility: CLINIC | Age: 71
End: 2021-10-22

## 2021-10-22 VITALS
WEIGHT: 166.4 LBS | HEIGHT: 66 IN | DIASTOLIC BLOOD PRESSURE: 78 MMHG | HEART RATE: 60 BPM | SYSTOLIC BLOOD PRESSURE: 122 MMHG | BODY MASS INDEX: 26.74 KG/M2

## 2021-10-22 DIAGNOSIS — I44.0 FIRST DEGREE AV BLOCK: ICD-10-CM

## 2021-10-22 DIAGNOSIS — R94.31 ABNORMAL ELECTROCARDIOGRAM (ECG) (EKG): ICD-10-CM

## 2021-10-22 DIAGNOSIS — E78.2 MIXED HYPERLIPIDEMIA: ICD-10-CM

## 2021-10-22 DIAGNOSIS — R61 DIAPHORESIS: ICD-10-CM

## 2021-10-22 DIAGNOSIS — R42 DIZZINESS: Primary | ICD-10-CM

## 2021-10-22 DIAGNOSIS — R93.1 AGATSTON CAC SCORE, >400: ICD-10-CM

## 2021-10-22 DIAGNOSIS — I10 ESSENTIAL HYPERTENSION: ICD-10-CM

## 2021-10-22 DIAGNOSIS — R42 LIGHTHEADEDNESS: ICD-10-CM

## 2021-10-22 PROCEDURE — 93000 ELECTROCARDIOGRAM COMPLETE: CPT | Performed by: INTERNAL MEDICINE

## 2021-10-22 PROCEDURE — 99204 OFFICE O/P NEW MOD 45 MIN: CPT | Performed by: INTERNAL MEDICINE

## 2021-10-22 NOTE — PROGRESS NOTES
Date of Office Visit: 10/22/21  Encounter Provider: Jd Mcmahon MD  Place of Service: [facility] CARDIOLOGY  Patient Name: Jana Page  :1950    Chief Complaint   Patient presents with   • Irregular Heart Beat   :     HPI:     Ms. Page is 71 y.o. and presents today at the request of RACHELE Walker, for dizziness and a first-degree AV block.     She has a history of hypertension and hyperlipidemia. Her father had premature CAD. She is a former smoker having quit in .     She was seen in our office in 2017 for an abnormal calcium score; it was 659 (LM 0, , Cx 138, ).  An echo and perfusion stress were performed and were normal. She has been maintained on aspirin and atorvastatin.     She has had intermittent dizziness.  Her symptoms can vary, and can include lightheadedness and spinning. They are highly associated with position changes and one occurred on a merry-go-round. She gets nauseated and diaphoretic with them. She does not have palpitations. She denies chest pain or dyspnea. The episodes are sporadic and may happen once or twice per week.     Past Medical History:   Diagnosis Date   • Anxiety    • Aortic atherosclerosis (HCC)    • DDD (degenerative disc disease), cervical    • Diverticulitis of large intestine    • Diverticulosis of intestine    • Fibrocystic breast changes    • Hyperlipidemia    • Hypertension    • Impaired fasting blood sugar    • Lateral epicondylitis of right elbow    • Lightheadedness 2/10/2017   • Lipoma of left lower extremity    • New daily persistent headache 3/9/2017   • Osteoarthritis of knee    • Osteopenia    • Pancreatic cyst     NO CHANGE IN  FROM    • Post-menopausal    • Scoliosis    • Steatosis of liver    • Uterine leiomyoma        Past Surgical History:   Procedure Laterality Date   • COLONOSCOPY  2011           Social History     Socioeconomic History   • Marital status:     • Number of children: 2   Tobacco Use   • Smoking status: Former Smoker     Packs/day: 1.00     Years: 10.00     Pack years: 10.00     Types: Cigarettes     Quit date:      Years since quittin.8   • Smokeless tobacco: Never Used   Vaping Use   • Vaping Use: Never used   Substance and Sexual Activity   • Alcohol use: Yes     Comment: social   • Drug use: No   • Sexual activity: Defer       Family History   Problem Relation Age of Onset   • Hypertension Mother    • Osteoarthritis Mother    • Breast cancer Mother         in situ   • Stroke Mother    • Diabetes Mother    • Atrial fibrillation Mother    • Heart attack Father 38   • Coronary artery disease Father         CABG   • Colon polyps Sister        Review of Systems   Constitutional: Positive for diaphoresis and malaise/fatigue.   Neurological: Positive for dizziness, headaches and light-headedness.   All other systems reviewed and are negative.      Allergies   Allergen Reactions   • Amoxicillin-Pot Clavulanate Diarrhea   • Ampicillin GI Intolerance         Current Outpatient Medications:   •  Accu-Chek FastClix Lancets misc, Use to check blood sugar once daily, Disp: 100 each, Rfl: 2  •  amLODIPine (NORVASC) 5 MG tablet, Take 1 tablet by mouth Daily., Disp: 90 tablet, Rfl: 3  •  aspirin 81 MG EC tablet, Take 81 mg by mouth As Needed., Disp: , Rfl:   •  atorvastatin (LIPITOR) 20 MG tablet, Take 1 tablet by mouth Daily., Disp: 90 tablet, Rfl: 3  •  Cholecalciferol (VITAMIN D) 1000 UNITS tablet, Take  by mouth., Disp: , Rfl:   •  Coenzyme Q10 (CO Q 10) 10 MG capsule, Take  by mouth Daily., Disp: , Rfl:   •  DULoxetine (CYMBALTA) 60 MG capsule, Take 1 capsule by mouth Daily., Disp: 90 capsule, Rfl: 3  •  fluticasone (FLONASE) 50 MCG/ACT nasal spray, 2 sprays into each nostril daily. Administer 2 sprays in each nostril for each dose., Disp: , Rfl:   •  glucose blood test strip, Use to check blood sugar once daily, Disp: 100 each, Rfl: 3  •  Multiple  "Vitamin (MULTIVITAMIN) capsule, Take 1 capsule by mouth Daily., Disp: , Rfl:   •  olmesartan (BENICAR) 40 MG tablet, Take 1 tablet by mouth Daily., Disp: 90 tablet, Rfl: 3  •  prednisoLONE acetate (PRED FORTE) 1 % ophthalmic suspension, , Disp: , Rfl:   •  Probiotic Product (PRO-BIOTIC BLEND) capsule, Take  by mouth Daily., Disp: , Rfl:   •  propranolol (INDERAL) 60 MG tablet, Take 1 tablet by mouth 2 (Two) Times a Day., Disp: 180 tablet, Rfl: 3      Objective:     Vitals:    10/22/21 1047   BP: 122/78   BP Location: Left arm   Pulse: 60   Weight: 75.5 kg (166 lb 6.4 oz)   Height: 166.8 cm (65.68\")     Body mass index is 27.12 kg/m².    Vitals reviewed.   Constitutional:       Appearance: Healthy appearance. Well-developed and not in distress.   Eyes:      Conjunctiva/sclera: Conjunctivae normal.   HENT:      Head: Normocephalic.      Nose: Nose normal.         Comments: Masked  Neck:      Vascular: No JVD. JVD normal.      Lymphadenopathy: No cervical adenopathy.   Pulmonary:      Effort: Pulmonary effort is normal.      Breath sounds: Normal breath sounds.   Cardiovascular:      Normal rate. Regular rhythm.      Murmurs: There is no murmur.   Pulses:     Intact distal pulses.   Edema:     Peripheral edema absent.   Abdominal:      Palpations: Abdomen is soft.      Tenderness: There is no abdominal tenderness.   Musculoskeletal: Normal range of motion.      Cervical back: Normal range of motion. Skin:     General: Skin is warm and dry.      Findings: No rash.   Neurological:      General: No focal deficit present.      Mental Status: Alert, oriented to person, place, and time and oriented to person, place and time.      Cranial Nerves: No cranial nerve deficit.   Psychiatric:         Behavior: Behavior normal.         Thought Content: Thought content normal.         Judgment: Judgment normal.           ECG 12 Lead    Date/Time: 10/22/2021 11:27 AM  Performed by: Jd Mcmahon MD  Authorized by: Jd Mcmahon MD "   Comparison: compared with previous ECG   Similar to previous ECG  Rhythm: sinus rhythm  Conduction: 1st degree AV block  ST Segments: ST segments normal  T Waves: T waves normal  QRS axis: normal  Other: no other findings    Clinical impression: non-specific ECG              Assessment:       Diagnosis Plan   1. Dizziness  ECG 12 Lead    Stress Test With Myocardial Perfusion One Day    Holter Monitor - 72 Hour Up To 15 Days    Adult Transthoracic Echo Complete W/ Cont if Necessary Per Protocol   2. Lightheadedness  Stress Test With Myocardial Perfusion One Day    Holter Monitor - 72 Hour Up To 15 Days    Adult Transthoracic Echo Complete W/ Cont if Necessary Per Protocol   3. Diaphoresis  Stress Test With Myocardial Perfusion One Day    Holter Monitor - 72 Hour Up To 15 Days    Adult Transthoracic Echo Complete W/ Cont if Necessary Per Protocol   4. First degree AV block     5. Essential hypertension     6. Mixed hyperlipidemia     7. Agatston CAC score, >400  Stress Test With Myocardial Perfusion One Day    Adult Transthoracic Echo Complete W/ Cont if Necessary Per Protocol   8. Abnormal electrocardiogram (ECG) (EKG)   Stress Test With Myocardial Perfusion One Day    Adult Transthoracic Echo Complete W/ Cont if Necessary Per Protocol        Plan:       She has episodes of vertiginous dizziness and are often related to positional changes, and are associated with diaphoresis and nausea.  There was concern about a first-degree AV block that was seen on her EKG, but this is a completely benign finding, and has been seen going back to 2016.  Her blood pressure is well controlled, and it does not truly sound orthostatic.  She has impaired fasting glucose, and I explained that it could potentially be due to low blood sugar, but it would be all that it would only come on after she changes positions.    Just to exclude anything sinister, I have recommended that she have an echo, a stress test, and a 14-day rhythm monitor  performed.    Because the symptoms are also associated with headaches, I would recommend that she have some type of cerebral imaging, but I will defer this to her primary care providers.    In 2017, she was noted to have a significantly elevated calcium score of 659.  She denies angina.  Again, I am getting the perfusion stress test as above.  She is on aspirin and atorvastatin, and her last lipid panel showed an LDL of 50 and an HDL of 44.    Sincerely,       Jd Mcmahon MD                 Patient with cellulitis of left leg, POA. improved.   ID input noted and appreciated.  Will change to oral antibiotics and discharge home.

## 2021-10-28 ENCOUNTER — TELEPHONE (OUTPATIENT)
Dept: GASTROENTEROLOGY | Facility: CLINIC | Age: 71
End: 2021-10-28

## 2021-10-28 NOTE — TELEPHONE ENCOUNTER
Pt is calling to schedule procedure, ask that if you dont get her to give some dates and she will pick one and call back  [phone]

## 2021-10-29 ENCOUNTER — TELEPHONE (OUTPATIENT)
Dept: GASTROENTEROLOGY | Facility: CLINIC | Age: 71
End: 2021-10-29

## 2021-10-29 NOTE — TELEPHONE ENCOUNTER
Spoke with patient for CS. Scheduled 01/28/2022 with tentative arrival time around 10:00 or as directed by E. Prep packet mailed to verified address. Spoke with Lynn--pt requesting Sutab instead of Miralax. Message sent to provider for Sutab.    KH- patient is requesting Sutab colon prep instead of Miralax. Please advise.

## 2021-11-04 ENCOUNTER — HOSPITAL ENCOUNTER (OUTPATIENT)
Dept: CARDIOLOGY | Facility: HOSPITAL | Age: 71
Discharge: HOME | End: 2021-11-04

## 2021-11-04 VITALS
HEART RATE: 65 BPM | SYSTOLIC BLOOD PRESSURE: 124 MMHG | WEIGHT: 166 LBS | HEIGHT: 67 IN | BODY MASS INDEX: 26.06 KG/M2 | DIASTOLIC BLOOD PRESSURE: 82 MMHG

## 2021-11-04 DIAGNOSIS — R93.1 AGATSTON CAC SCORE, >400: ICD-10-CM

## 2021-11-04 DIAGNOSIS — R94.31 ABNORMAL ELECTROCARDIOGRAM (ECG) (EKG): ICD-10-CM

## 2021-11-04 DIAGNOSIS — R42 DIZZINESS: ICD-10-CM

## 2021-11-04 DIAGNOSIS — R61 DIAPHORESIS: ICD-10-CM

## 2021-11-04 DIAGNOSIS — R42 LIGHTHEADEDNESS: ICD-10-CM

## 2021-11-04 LAB
BH CV NUCLEAR PRIOR STUDY: 3
BH CV REST NUCLEAR ISOTOPE DOSE: 10.9 MCI
BH CV STRESS BP STAGE 1: NORMAL
BH CV STRESS DURATION MIN STAGE 1: 3
BH CV STRESS DURATION MIN STAGE 2: 1
BH CV STRESS DURATION SEC STAGE 1: 0
BH CV STRESS DURATION SEC STAGE 2: 0
BH CV STRESS GRADE STAGE 1: 10
BH CV STRESS GRADE STAGE 2: 12
BH CV STRESS HR STAGE 1: 108
BH CV STRESS HR STAGE 2: 103
BH CV STRESS METS STAGE 1: 5
BH CV STRESS METS STAGE 2: 7.5
BH CV STRESS NUCLEAR ISOTOPE DOSE: 34.7 MCI
BH CV STRESS PROTOCOL 1: NORMAL
BH CV STRESS PROTOCOL 2 BP STAGE 1: NORMAL
BH CV STRESS PROTOCOL 2 COMMENTS STAGE 1: NORMAL
BH CV STRESS PROTOCOL 2 DOSE REGADENOSON STAGE 1: 0.4
BH CV STRESS PROTOCOL 2 DURATION MIN STAGE 1: 0
BH CV STRESS PROTOCOL 2 DURATION SEC STAGE 1: 10
BH CV STRESS PROTOCOL 2 HR STAGE 1: 96
BH CV STRESS PROTOCOL 2 STAGE 1: 1
BH CV STRESS PROTOCOL 2: NORMAL
BH CV STRESS RECOVERY BP: NORMAL MMHG
BH CV STRESS RECOVERY HR: 70 BPM
BH CV STRESS SPEED STAGE 1: 1.7
BH CV STRESS SPEED STAGE 2: 2.5
BH CV STRESS STAGE 1: 1
BH CV STRESS STAGE 2: 2
LV EF NUC BP: 70 %
MAXIMAL PREDICTED HEART RATE: 149 BPM
PERCENT MAX PREDICTED HR: 64.43 %
STRESS BASELINE BP: NORMAL MMHG
STRESS BASELINE HR: 71 BPM
STRESS PERCENT HR: 76 %
STRESS POST EXERCISE DUR MIN: 4 MIN
STRESS POST PEAK BP: NORMAL MMHG
STRESS POST PEAK HR: 96 BPM
STRESS TARGET HR: 127 BPM

## 2021-11-04 PROCEDURE — 93018 CV STRESS TEST I&R ONLY: CPT | Performed by: INTERNAL MEDICINE

## 2021-11-04 PROCEDURE — 78452 HT MUSCLE IMAGE SPECT MULT: CPT

## 2021-11-04 PROCEDURE — 78452 HT MUSCLE IMAGE SPECT MULT: CPT | Performed by: INTERNAL MEDICINE

## 2021-11-04 PROCEDURE — 93016 CV STRESS TEST SUPVJ ONLY: CPT | Performed by: INTERNAL MEDICINE

## 2021-11-04 PROCEDURE — A9502 TC99M TETROFOSMIN: HCPCS | Performed by: INTERNAL MEDICINE

## 2021-11-04 PROCEDURE — 93306 TTE W/DOPPLER COMPLETE: CPT | Performed by: INTERNAL MEDICINE

## 2021-11-04 PROCEDURE — 93306 TTE W/DOPPLER COMPLETE: CPT

## 2021-11-04 PROCEDURE — 93017 CV STRESS TEST TRACING ONLY: CPT

## 2021-11-04 PROCEDURE — 0 TECHNETIUM TETROFOSMIN KIT: Performed by: INTERNAL MEDICINE

## 2021-11-04 PROCEDURE — 25010000002 REGADENOSON 0.4 MG/5ML SOLUTION: Performed by: INTERNAL MEDICINE

## 2021-11-04 RX ADMIN — TETROFOSMIN 1 DOSE: 1.38 INJECTION, POWDER, LYOPHILIZED, FOR SOLUTION INTRAVENOUS at 07:35

## 2021-11-04 RX ADMIN — REGADENOSON 0.4 MG: 0.08 INJECTION, SOLUTION INTRAVENOUS at 08:42

## 2021-11-04 RX ADMIN — TETROFOSMIN 1 DOSE: 1.38 INJECTION, POWDER, LYOPHILIZED, FOR SOLUTION INTRAVENOUS at 08:42

## 2021-11-05 LAB
AORTIC ARCH: 2.4 CM
ASCENDING AORTA: 0.5 CM
BH CV ECHO MEAS - ACS: 1.9 CM
BH CV ECHO MEAS - AI MAX PG: 59.3 MMHG
BH CV ECHO MEAS - AI MAX VEL: 384.9 CM/SEC
BH CV ECHO MEAS - AO MAX PG (FULL): 3.8 MMHG
BH CV ECHO MEAS - AO MAX PG: 7.4 MMHG
BH CV ECHO MEAS - AO MEAN PG (FULL): 1.8 MMHG
BH CV ECHO MEAS - AO MEAN PG: 3.8 MMHG
BH CV ECHO MEAS - AO ROOT AREA (BSA CORRECTED): 2
BH CV ECHO MEAS - AO ROOT AREA: 11 CM^2
BH CV ECHO MEAS - AO ROOT DIAM: 3.7 CM
BH CV ECHO MEAS - AO V2 MAX: 136.3 CM/SEC
BH CV ECHO MEAS - AO V2 MEAN: 90.8 CM/SEC
BH CV ECHO MEAS - AO V2 VTI: 27.8 CM
BH CV ECHO MEAS - ASC AORTA: 3.5 CM
BH CV ECHO MEAS - AVA(I,A): 2.4 CM^2
BH CV ECHO MEAS - AVA(I,D): 2.4 CM^2
BH CV ECHO MEAS - AVA(V,A): 2.2 CM^2
BH CV ECHO MEAS - AVA(V,D): 2.2 CM^2
BH CV ECHO MEAS - BSA(HAYCOCK): 1.9 M^2
BH CV ECHO MEAS - BSA: 1.9 M^2
BH CV ECHO MEAS - BZI_BMI: 26 KILOGRAMS/M^2
BH CV ECHO MEAS - BZI_METRIC_HEIGHT: 170.2 CM
BH CV ECHO MEAS - BZI_METRIC_WEIGHT: 75.3 KG
BH CV ECHO MEAS - EDV(MOD-SP2): 92 ML
BH CV ECHO MEAS - EDV(MOD-SP4): 83 ML
BH CV ECHO MEAS - EDV(TEICH): 80.4 ML
BH CV ECHO MEAS - EF(CUBED): 77.9 %
BH CV ECHO MEAS - EF(MOD-BP): 66.2 %
BH CV ECHO MEAS - EF(MOD-SP2): 66.3 %
BH CV ECHO MEAS - EF(MOD-SP4): 66.3 %
BH CV ECHO MEAS - EF(TEICH): 70.4 %
BH CV ECHO MEAS - ESV(MOD-SP2): 31 ML
BH CV ECHO MEAS - ESV(MOD-SP4): 28 ML
BH CV ECHO MEAS - ESV(TEICH): 23.8 ML
BH CV ECHO MEAS - FS: 39.5 %
BH CV ECHO MEAS - IVS/LVPW: 0.97
BH CV ECHO MEAS - IVSD: 1.2 CM
BH CV ECHO MEAS - LAT PEAK E' VEL: 11.2 CM/SEC
BH CV ECHO MEAS - LV DIASTOLIC VOL/BSA (35-75): 44.4 ML/M^2
BH CV ECHO MEAS - LV MASS(C)D: 189.7 GRAMS
BH CV ECHO MEAS - LV MASS(C)DI: 101.5 GRAMS/M^2
BH CV ECHO MEAS - LV MAX PG: 3.6 MMHG
BH CV ECHO MEAS - LV MEAN PG: 2 MMHG
BH CV ECHO MEAS - LV SYSTOLIC VOL/BSA (12-30): 15 ML/M^2
BH CV ECHO MEAS - LV V1 MAX: 95.1 CM/SEC
BH CV ECHO MEAS - LV V1 MEAN: 65.2 CM/SEC
BH CV ECHO MEAS - LV V1 VTI: 21.2 CM
BH CV ECHO MEAS - LVIDD: 4.2 CM
BH CV ECHO MEAS - LVIDS: 2.6 CM
BH CV ECHO MEAS - LVLD AP2: 8.2 CM
BH CV ECHO MEAS - LVLD AP4: 7.9 CM
BH CV ECHO MEAS - LVLS AP2: 6.5 CM
BH CV ECHO MEAS - LVLS AP4: 6.1 CM
BH CV ECHO MEAS - LVOT AREA (M): 3.1 CM^2
BH CV ECHO MEAS - LVOT AREA: 3.1 CM^2
BH CV ECHO MEAS - LVOT DIAM: 2 CM
BH CV ECHO MEAS - LVPWD: 1.3 CM
BH CV ECHO MEAS - MED PEAK E' VEL: 7.9 CM/SEC
BH CV ECHO MEAS - MR MAX PG: 72 MMHG
BH CV ECHO MEAS - MR MAX VEL: 424.3 CM/SEC
BH CV ECHO MEAS - MV A DUR: 0.11 SEC
BH CV ECHO MEAS - MV A MAX VEL: 62.1 CM/SEC
BH CV ECHO MEAS - MV DEC SLOPE: 372.7 CM/SEC^2
BH CV ECHO MEAS - MV DEC TIME: 0.14 SEC
BH CV ECHO MEAS - MV E MAX VEL: 66.8 CM/SEC
BH CV ECHO MEAS - MV E/A: 1.1
BH CV ECHO MEAS - MV MAX PG: 2.6 MMHG
BH CV ECHO MEAS - MV MEAN PG: 0.96 MMHG
BH CV ECHO MEAS - MV P1/2T MAX VEL: 77.2 CM/SEC
BH CV ECHO MEAS - MV P1/2T: 60.7 MSEC
BH CV ECHO MEAS - MV V2 MAX: 80.8 CM/SEC
BH CV ECHO MEAS - MV V2 MEAN: 43.9 CM/SEC
BH CV ECHO MEAS - MV V2 VTI: 22.7 CM
BH CV ECHO MEAS - MVA P1/2T LCG: 2.8 CM^2
BH CV ECHO MEAS - MVA(P1/2T): 3.6 CM^2
BH CV ECHO MEAS - MVA(VTI): 2.9 CM^2
BH CV ECHO MEAS - PA ACC TIME: 0.12 SEC
BH CV ECHO MEAS - PA MAX PG (FULL): 0.32 MMHG
BH CV ECHO MEAS - PA MAX PG: 2.1 MMHG
BH CV ECHO MEAS - PA PR(ACCEL): 26.7 MMHG
BH CV ECHO MEAS - PA V2 MAX: 72.9 CM/SEC
BH CV ECHO MEAS - PULM A REVS DUR: 0.1 SEC
BH CV ECHO MEAS - PULM A REVS VEL: 40.7 CM/SEC
BH CV ECHO MEAS - PULM DIAS VEL: 37.3 CM/SEC
BH CV ECHO MEAS - PULM S/D: 1.3
BH CV ECHO MEAS - PULM SYS VEL: 48.8 CM/SEC
BH CV ECHO MEAS - PVA(V,A): 2.8 CM^2
BH CV ECHO MEAS - PVA(V,D): 2.8 CM^2
BH CV ECHO MEAS - QP/QS: 0.71
BH CV ECHO MEAS - RAP SYSTOLE: 3 MMHG
BH CV ECHO MEAS - RV MAX PG: 1.8 MMHG
BH CV ECHO MEAS - RV MEAN PG: 1.1 MMHG
BH CV ECHO MEAS - RV V1 MAX: 67.3 CM/SEC
BH CV ECHO MEAS - RV V1 MEAN: 50.2 CM/SEC
BH CV ECHO MEAS - RV V1 VTI: 15.4 CM
BH CV ECHO MEAS - RVOT AREA: 3.1 CM^2
BH CV ECHO MEAS - RVOT DIAM: 2 CM
BH CV ECHO MEAS - RVSP: 24 MMHG
BH CV ECHO MEAS - SI(AO): 164.1 ML/M^2
BH CV ECHO MEAS - SI(CUBED): 31.8 ML/M^2
BH CV ECHO MEAS - SI(LVOT): 35.4 ML/M^2
BH CV ECHO MEAS - SI(MOD-SP2): 32.7 ML/M^2
BH CV ECHO MEAS - SI(MOD-SP4): 29.4 ML/M^2
BH CV ECHO MEAS - SI(TEICH): 30.3 ML/M^2
BH CV ECHO MEAS - SUP REN AO DIAM: 2.4 CM
BH CV ECHO MEAS - SV(AO): 306.6 ML
BH CV ECHO MEAS - SV(CUBED): 59.4 ML
BH CV ECHO MEAS - SV(LVOT): 66.1 ML
BH CV ECHO MEAS - SV(MOD-SP2): 61 ML
BH CV ECHO MEAS - SV(MOD-SP4): 55 ML
BH CV ECHO MEAS - SV(RVOT): 47 ML
BH CV ECHO MEAS - SV(TEICH): 56.6 ML
BH CV ECHO MEAS - TAPSE (>1.6): 2.6 CM
BH CV ECHO MEAS - TR MAX VEL: 231.7 CM/SEC
BH CV ECHO MEASUREMENTS AVERAGE E/E' RATIO: 6.99
BH CV XLRA - RV BASE: 3.1 CM
BH CV XLRA - RV LENGTH: 6.4 CM
BH CV XLRA - RV MID: 2.5 CM
BH CV XLRA - TDI S': 14 CM/SEC
LEFT ATRIUM VOLUME INDEX: 26 ML/M2
SINUS: 3.6 CM
STJ: 3 CM

## 2021-11-05 NOTE — PROGRESS NOTES
Attempted to call patient and discuss results.  I left a message.  Is also noted she is having a stress test today as well.  We will call again on Monday

## 2021-11-08 ENCOUNTER — TELEPHONE (OUTPATIENT)
Dept: CARDIOLOGY | Facility: CLINIC | Age: 71
End: 2021-11-08

## 2021-11-08 NOTE — TELEPHONE ENCOUNTER
Pt stated that she will being playing tennis in the morning, so the best time to call would be in the afternoon.

## 2021-11-08 NOTE — TELEPHONE ENCOUNTER
Pt returned your call to go over stress test and echo results. The best number to call is her cell.  [phone].

## 2021-11-09 DIAGNOSIS — Q23.1 BICUSPID AORTIC VALVE: Primary | ICD-10-CM

## 2021-11-09 NOTE — TELEPHONE ENCOUNTER
I called -- normal stress test, normal echo except for bicuspid aortic valve.  Still awaiting her monitor results.    I will get a chest CTA given bicuspid aortic valve to make sure there is no abnormality there. Please see order.    [email or web address]

## 2021-11-30 ENCOUNTER — TELEPHONE (OUTPATIENT)
Dept: INTERNAL MEDICINE | Facility: CLINIC | Age: 71
End: 2021-11-30

## 2021-11-30 NOTE — TELEPHONE ENCOUNTER
Caller: Kaylee Jana S     Relationship: Self     Best call back number: [phone] OK TO LEAVE VOICEMAIL     What medication are you requesting: PATIENT DOES NOT KNOW THE NAME, BUT SAID IT WAS TWO MEDICATIONS PRESCRIBED  FOR DIVERTICULITIS.      What are your current symptoms: DIARREAH, BURNING IN STOMACH      How long have you been experiencing symptoms: Sunday 11/28/2021     Have you had these symptoms before:                                  [x]? Yes  []? No     Have you been treated for these symptoms before:              [x]? Yes  []? No     If a prescription is needed, what is your preferred pharmacy and phone number: [facility] [place] [facility] [address] AT [facility] [address] [address]  - [phone]  - [phone]   [phone]

## 2021-11-30 NOTE — TELEPHONE ENCOUNTER
Called and left the pt a msg that she would need a visit with either Dr Trinidad or NP to evaluate and treat symptoms.    OK FOR HUB TO READ

## 2021-12-14 ENCOUNTER — HOSPITAL ENCOUNTER (OUTPATIENT)
Dept: CT IMAGING | Facility: HOSPITAL | Age: 71
Discharge: HOME | End: 2021-12-14
Admitting: INTERNAL MEDICINE

## 2021-12-14 DIAGNOSIS — Q23.1 BICUSPID AORTIC VALVE: ICD-10-CM

## 2021-12-14 LAB — CREAT BLDA-MCNC: 0.9 MG/DL (ref 0.6–1.3)

## 2021-12-14 PROCEDURE — 71275 CT ANGIOGRAPHY CHEST: CPT

## 2021-12-14 PROCEDURE — 0 IOPAMIDOL PER 1 ML: Performed by: INTERNAL MEDICINE

## 2021-12-14 PROCEDURE — 82565 ASSAY OF CREATININE: CPT

## 2021-12-14 RX ADMIN — IOPAMIDOL 95 ML: 755 INJECTION, SOLUTION INTRAVENOUS at 15:00

## 2021-12-16 NOTE — PROGRESS NOTES
I called, this is good. Because of the bicuspid valve, I want to see her once per year.  Please arrange that with me.    Jorge dimas

## 2021-12-18 DIAGNOSIS — I10 BENIGN ESSENTIAL HYPERTENSION: ICD-10-CM

## 2021-12-20 RX ORDER — PROPRANOLOL HYDROCHLORIDE 60 MG/1
TABLET ORAL
Qty: 180 TABLET | Refills: 3 | Status: SHIPPED | OUTPATIENT
Start: 2021-12-20 | End: 2022-12-27

## 2021-12-27 ENCOUNTER — APPOINTMENT (OUTPATIENT)
Dept: WOMENS IMAGING | Facility: HOSPITAL | Age: 71
End: 2021-12-27

## 2021-12-27 PROCEDURE — 77067 SCR MAMMO BI INCL CAD: CPT | Performed by: RADIOLOGY

## 2021-12-27 PROCEDURE — 77063 BREAST TOMOSYNTHESIS BI: CPT | Performed by: RADIOLOGY

## 2022-01-10 ENCOUNTER — TELEPHONE (OUTPATIENT)
Dept: GASTROENTEROLOGY | Facility: CLINIC | Age: 72
End: 2022-01-10

## 2022-01-10 NOTE — TELEPHONE ENCOUNTER
Good Morning,    Patient call this morning about rescheduling her procedure with Dr. Contreras. Please call patient back.

## 2022-01-12 ENCOUNTER — TELEPHONE (OUTPATIENT)
Dept: GASTROENTEROLOGY | Facility: CLINIC | Age: 72
End: 2022-01-12

## 2022-01-12 NOTE — TELEPHONE ENCOUNTER
Lala, pt is calling stating that she my need to reschedule her procedure, for maybe eariler in the day or another day if you could give pt a call back as soon as possible thanks.

## 2022-01-12 NOTE — TELEPHONE ENCOUNTER
WINIFRED patient via telephone for colonoscopy. Rescheduled to 04/05/2022 arriving at 8:30am. Prep packet mailed to patients verified address. Pt also advised that  his/her arrival time may vary based on [facility] guidelines. WINIFRED Bailey--Eleni

## 2022-01-12 NOTE — TELEPHONE ENCOUNTER
WINIFRED patient via telephone for colonoscopy. Rescheduled to 04/05/2022 arriving at 8:30am. Prep packet mailed to patients verified address. Pt also advised that  his/her arrival time may vary based on [facility] guidelines. WINIFRED Bailey--Eleni

## 2022-01-19 ENCOUNTER — TELEPHONE (OUTPATIENT)
Dept: INTERNAL MEDICINE | Facility: CLINIC | Age: 72
End: 2022-01-19

## 2022-01-19 NOTE — TELEPHONE ENCOUNTER
Left msg on the pt's voicemail that she would need to be seen and evaluated either here or at her local urgent care    OK FOR LUDIVINA TO READ

## 2022-01-19 NOTE — TELEPHONE ENCOUNTER
PATIENT STATES:THAT SHE HAS Diverticulitis AND WOULD L;ANGELICA TPO HAVE SOMETHING CALLED IN PLEASE ADVISE      PATIENT CAN BE REACHED ON: [phone]    PHARM[facility] [place] [address] AT [facility] [address] - [phone]  - [phone] FX  [phone]

## 2022-02-07 ENCOUNTER — TELEPHONE (OUTPATIENT)
Dept: INTERNAL MEDICINE | Facility: CLINIC | Age: 72
End: 2022-02-07

## 2022-02-07 NOTE — TELEPHONE ENCOUNTER
Caller: Jana Page    Relationship: Self    Best call back number: [phone] (M)    What is the best time to reach you: ANYTIME, ASAP    Who are you requesting to speak with (clinical staff, provider,  specific staff member): CLINICAL STAFF    Do you know the name of the person who called: JANA PAGE    What was the call regarding: PATIENT WENT TO URGENT CARE ON 02/05/2022 DUE TO DIVERTICULITIS FLARE UP AND IS FEELING BETTER AFTER BEING GIVEN 2 MEDICATIONS METRONIDAZOLE AND CIPROFLOXACIN BUT WANTS TO KNOW IF SHE SHOULD SCHEDULE A FOLLOW UP APPOINTMENT WITH DR JIMMY SEN, PLEASE ADVISE ASAP    Do you require a callback: YES, ASAP

## 2022-02-09 ENCOUNTER — TELEPHONE (OUTPATIENT)
Dept: GASTROENTEROLOGY | Facility: CLINIC | Age: 72
End: 2022-02-09

## 2022-02-10 ENCOUNTER — OFFICE VISIT (OUTPATIENT)
Dept: INTERNAL MEDICINE | Facility: CLINIC | Age: 72
End: 2022-02-10

## 2022-02-10 ENCOUNTER — HOSPITAL ENCOUNTER (OUTPATIENT)
Dept: CT IMAGING | Facility: HOSPITAL | Age: 72
Discharge: HOME | End: 2022-02-10
Admitting: NURSE PRACTITIONER

## 2022-02-10 VITALS
HEIGHT: 66 IN | SYSTOLIC BLOOD PRESSURE: 120 MMHG | WEIGHT: 169 LBS | OXYGEN SATURATION: 97 % | BODY MASS INDEX: 27.16 KG/M2 | HEART RATE: 72 BPM | TEMPERATURE: 98.7 F | DIASTOLIC BLOOD PRESSURE: 80 MMHG

## 2022-02-10 DIAGNOSIS — R10.30 LOWER ABDOMINAL PAIN: Primary | ICD-10-CM

## 2022-02-10 DIAGNOSIS — D21.9 FIBROIDS: Chronic | ICD-10-CM

## 2022-02-10 LAB
ALBUMIN SERPL-MCNC: 4.7 G/DL (ref 3.5–5.2)
ALBUMIN/GLOB SERPL: 2.4 G/DL
ALP SERPL-CCNC: 81 U/L (ref 39–117)
ALT SERPL W P-5'-P-CCNC: 27 U/L (ref 1–33)
ANION GAP SERPL CALCULATED.3IONS-SCNC: 10 MMOL/L (ref 5–15)
AST SERPL-CCNC: 24 U/L (ref 1–32)
BASOPHILS # BLD AUTO: 0.03 10*3/MM3 (ref 0–0.2)
BASOPHILS NFR BLD AUTO: 0.3 % (ref 0–1.5)
BILIRUB SERPL-MCNC: 0.3 MG/DL (ref 0–1.2)
BUN SERPL-MCNC: 14 MG/DL (ref 8–23)
BUN/CREAT SERPL: 12.1 (ref 7–25)
CALCIUM SPEC-SCNC: 10 MG/DL (ref 8.6–10.5)
CHLORIDE SERPL-SCNC: 104 MMOL/L (ref 98–107)
CO2 SERPL-SCNC: 28 MMOL/L (ref 22–29)
CREAT SERPL-MCNC: 1.16 MG/DL (ref 0.57–1)
DEPRECATED RDW RBC AUTO: 41.2 FL (ref 37–54)
EOSINOPHIL # BLD AUTO: 0.22 10*3/MM3 (ref 0–0.4)
EOSINOPHIL NFR BLD AUTO: 2.3 % (ref 0.3–6.2)
ERYTHROCYTE [DISTWIDTH] IN BLOOD BY AUTOMATED COUNT: 13.1 % (ref 12.3–15.4)
GFR SERPL CREATININE-BSD FRML MDRD: 46 ML/MIN/1.73
GLOBULIN UR ELPH-MCNC: 2 GM/DL
GLUCOSE SERPL-MCNC: 74 MG/DL (ref 65–99)
HCT VFR BLD AUTO: 43.3 % (ref 34–46.6)
HGB BLD-MCNC: 14.5 G/DL (ref 12–15.9)
LYMPHOCYTES # BLD AUTO: 1.87 10*3/MM3 (ref 0.7–3.1)
LYMPHOCYTES NFR BLD AUTO: 19.9 % (ref 19.6–45.3)
MCH RBC QN AUTO: 29.4 PG (ref 26.6–33)
MCHC RBC AUTO-ENTMCNC: 33.5 G/DL (ref 31.5–35.7)
MCV RBC AUTO: 87.8 FL (ref 79–97)
MONOCYTES # BLD AUTO: 0.78 10*3/MM3 (ref 0.1–0.9)
MONOCYTES NFR BLD AUTO: 8.3 % (ref 5–12)
NEUTROPHILS NFR BLD AUTO: 6.5 10*3/MM3 (ref 1.7–7)
NEUTROPHILS NFR BLD AUTO: 69.2 % (ref 42.7–76)
PLATELET # BLD AUTO: 331 10*3/MM3 (ref 140–450)
PMV BLD AUTO: 10.5 FL (ref 6–12)
POTASSIUM SERPL-SCNC: 4.2 MMOL/L (ref 3.5–5.2)
PROT SERPL-MCNC: 6.7 G/DL (ref 6–8.5)
RBC # BLD AUTO: 4.93 10*6/MM3 (ref 3.77–5.28)
SODIUM SERPL-SCNC: 142 MMOL/L (ref 136–145)
WBC NRBC COR # BLD: 9.4 10*3/MM3 (ref 3.4–10.8)

## 2022-02-10 PROCEDURE — 36415 COLL VENOUS BLD VENIPUNCTURE: CPT | Performed by: NURSE PRACTITIONER

## 2022-02-10 PROCEDURE — 99214 OFFICE O/P EST MOD 30 MIN: CPT | Performed by: NURSE PRACTITIONER

## 2022-02-10 PROCEDURE — 80053 COMPREHEN METABOLIC PANEL: CPT | Performed by: NURSE PRACTITIONER

## 2022-02-10 PROCEDURE — 85025 COMPLETE CBC W/AUTO DIFF WBC: CPT | Performed by: NURSE PRACTITIONER

## 2022-02-10 PROCEDURE — 74176 CT ABD & PELVIS W/O CONTRAST: CPT

## 2022-02-10 NOTE — PROGRESS NOTES
"Chief Complaint  GI Problem (Pt stomach feels inflammed.)    Subjective          Jana Page presents to [facility] PRIMARY CARE  History of Present Illness  This is a 72 y/o female presenting to office for f/u with recent UC visit on 2/5 where patient had diverticulitis. Patient is currently taking cipro/flagyl for diverticulitis flare up. Patient reports that she is still experiencing some discomfort in her lower abdomen.     Patient reports she has been eating normal foods at this time. Patient reports she was not aware of fibroids located on CT scan prior. Patient reports the current discomfort feels sort of burning in nature and it is diffuse.   Objective   Vital Signs:   /80   Pulse 72   Temp 98.7 °F (37.1 °C) (Temporal)   Ht 167.6 cm (65.98\")   Wt 76.7 kg (169 lb)   SpO2 97%   BMI 27.29 kg/m²     Physical Exam  Vitals and nursing note reviewed.   Constitutional:       Appearance: Normal appearance.   HENT:      Head: Normocephalic and atraumatic.   Eyes:      Pupils: Pupils are equal, round, and reactive to light.   Cardiovascular:      Rate and Rhythm: Normal rate and regular rhythm.      Pulses: Normal pulses.      Heart sounds: Normal heart sounds. No murmur heard.  No friction rub. No gallop.    Pulmonary:      Effort: Pulmonary effort is normal. No respiratory distress.      Breath sounds: Normal breath sounds. No stridor. No wheezing, rhonchi or rales.   Abdominal:      General: Bowel sounds are normal. There is no distension.      Palpations: Abdomen is soft.      Tenderness: There is no abdominal tenderness.   Musculoskeletal:         General: No swelling or tenderness. Normal range of motion.      Cervical back: Normal range of motion and neck supple.   Skin:     General: Skin is warm and dry.      Capillary Refill: Capillary refill takes less than 2 seconds.      Coloration: Skin is not jaundiced or pale.   Neurological:      General: No focal deficit present.     "  Mental Status: She is alert and oriented to person, place, and time. Mental status is at baseline.      Motor: No weakness.   Psychiatric:         Mood and Affect: Mood normal.         Behavior: Behavior normal.         Thought Content: Thought content normal.         Judgment: Judgment normal.        Result Review :   The following data was reviewed by: RACHELE Walker on 02/10/2022:  Common labs    Common Labsle 9/17/21 9/17/21 9/17/21 9/17/21 12/14/21 2/10/22    1230 1230 1230 1230     Glucose  103 (A)       BUN  15       Creatinine  0.76   0.90    eGFR Non African Am  75       eGFR African Am  91       Sodium  139       Potassium  3.8       Chloride  103       Calcium  9.8       Total Protein  6.7       Albumin  5.10       Total Bilirubin  0.7       Alkaline Phosphatase  86       AST (SGOT)  30       ALT (SGPT)  33       WBC 9.27     9.40   Hemoglobin 14.7     14.5   Hematocrit 43.0     43.3   Platelets 293     331   Total Cholesterol   113      Triglycerides   100      HDL Cholesterol   44      LDL Cholesterol    50      Hemoglobin A1C    5.60     (A) Abnormal value       Comments are available for some flowsheets but are not being displayed.           Reviewed:  CT Abdomen Pelvis With Contrast (02/04/2017 12:49 PM)           Assessment and Plan    Diagnoses and all orders for this visit:    1. Lower abdominal pain (Primary)  Assessment & Plan:  CT scan abd/pelvis to r/o diverticulitis.   Continue on cipro/flagyl as she is taking.   We discussed pulling back to clear liquid diet and then start     Orders:  -     Cancel: Comprehensive metabolic panel  -     Cancel: CBC & Differential  -     CT Abdomen Pelvis Without Contrast  -     CBC & Differential  -     Comprehensive metabolic panel    2. Fibroids  Assessment & Plan:  Per CT scan in 2017-- showing fibroids in lobular uterus.   Patient would like to discuss with gynecology regarding possible fibroids .     Orders:  -     Ambulatory Referral to Obstetrics  / Gynecology    I spent 30 minutes caring for Jana on this date of service. This time includes time spent by me in the following activities:preparing for the visit, reviewing tests, obtaining and/or reviewing a separately obtained history, performing a medically appropriate examination and/or evaluation , counseling and educating the patient/family/caregiver, ordering medications, tests, or procedures, documenting information in the medical record and care coordination  Follow Up {Instructions Charge Capture  Follow-up Communications :23}  Return if symptoms worsen or fail to improve.  Patient was given instructions and counseling regarding her condition or for health maintenance advice. Please see specific information pulled into the AVS if appropriate.

## 2022-02-10 NOTE — TELEPHONE ENCOUNTER
Left message advising patient Diane had no sooner availability for procedures at this time. Advised patient to call back if she needed to reschedule her colonoscopy.

## 2022-02-10 NOTE — ASSESSMENT & PLAN NOTE
Per CT scan in 2017-- showing fibroids in lobular uterus.   Patient would like to discuss with gynecology regarding possible fibroids .

## 2022-02-10 NOTE — ASSESSMENT & PLAN NOTE
CT scan abd/pelvis to r/o diverticulitis.   Continue on cipro/flagyl as she is taking.   We discussed pulling back to clear liquid diet and then start

## 2022-02-11 ENCOUNTER — OFFICE VISIT (OUTPATIENT)
Dept: INTERNAL MEDICINE | Facility: CLINIC | Age: 72
End: 2022-02-11

## 2022-02-11 ENCOUNTER — TELEPHONE (OUTPATIENT)
Dept: INTERNAL MEDICINE | Facility: CLINIC | Age: 72
End: 2022-02-11

## 2022-02-11 VITALS
WEIGHT: 167 LBS | HEIGHT: 65 IN | OXYGEN SATURATION: 97 % | TEMPERATURE: 97.8 F | SYSTOLIC BLOOD PRESSURE: 130 MMHG | BODY MASS INDEX: 27.82 KG/M2 | HEART RATE: 59 BPM | DIASTOLIC BLOOD PRESSURE: 72 MMHG

## 2022-02-11 DIAGNOSIS — K57.92 DIVERTICULITIS: Primary | ICD-10-CM

## 2022-02-11 PROCEDURE — 99213 OFFICE O/P EST LOW 20 MIN: CPT | Performed by: FAMILY MEDICINE

## 2022-02-11 NOTE — PROGRESS NOTES
"Chief Complaint  Follow-up (Divertiulitis )    Subjective          Jana Page presents to [facility] PRIMARY CARE  History of Present Illness     Started on Cipro Flagyl in urgent care on February 5, 2022 and followed up with my colleague yesterday February 10, 2022 and was still taking the Cipro Flagyl.  CT scan ordered and showed diverticulosis with mild fat stranding adjacent to the distal descending colon raising concern for acute uncomplicated diverticulitis.  Pain has been improving and she is about to finish her antibiotics here soon.  Denies any melena.    It also showed some uterine fibroids for which my colleague had referred her to GYN.    Objective   Vital Signs:   /72 (BP Location: Left arm, Patient Position: Sitting, Cuff Size: Adult)   Pulse 59   Temp 97.8 °F (36.6 °C) (Temporal)   Ht 165.1 cm (65\")   Wt 75.8 kg (167 lb)   SpO2 97%   BMI 27.79 kg/m²     Physical Exam  Vitals and nursing note reviewed.   Constitutional:       General: She is not in acute distress.     Appearance: Normal appearance.   Cardiovascular:      Rate and Rhythm: Normal rate and regular rhythm.      Heart sounds: Normal heart sounds. No murmur heard.      Pulmonary:      Effort: Pulmonary effort is normal.      Breath sounds: Normal breath sounds.   Abdominal:      General: Abdomen is flat. Bowel sounds are normal.      Palpations: Abdomen is soft.      Tenderness: There is generalized abdominal tenderness. There is no guarding or rebound.      Comments: Mild tenderness to palpation but nonspecific   Neurological:      Mental Status: She is alert.        Result Review :   The following data was reviewed by: Sabino Trinidad MD on 02/11/2022:  Common labs    Common Labsle 9/17/21 9/17/21 9/17/21 9/17/21 12/14/21 2/10/22 2/10/22    1230 1230 1230 1230  1530 1530   Glucose  103 (A)     74   BUN  15     14   Creatinine  0.76   0.90  1.16 (A)   eGFR Non  Am  75     46 (A)   eGFR  Am "  91        Sodium  139     142   Potassium  3.8     4.2   Chloride  103     104   Calcium  9.8     10.0   Total Protein  6.7        Albumin  5.10     4.70   Total Bilirubin  0.7     0.3   Alkaline Phosphatase  86     81   AST (SGOT)  30     24   ALT (SGPT)  33     27   WBC 9.27     9.40    Hemoglobin 14.7     14.5    Hematocrit 43.0     43.3    Platelets 293     331    Total Cholesterol   113       Triglycerides   100       HDL Cholesterol   44       LDL Cholesterol    50       Hemoglobin A1C    5.60      (A) Abnormal value       Comments are available for some flowsheets but are not being displayed.                     Assessment and Plan    Diagnoses and all orders for this visit:    1. Diverticulitis (Primary)       History and physical exam today are consistent with the CT findings of mild diverticulitis and she is on good treatment.  I advised her to continue her antibiotics until finished, eat small meals and advance diet as tolerated.  Let us know if she is not improving.        Follow Up   Return if symptoms worsen or fail to improve.  Patient was given instructions and counseling regarding her condition or for health maintenance advice. Please see specific information pulled into the AVS if appropriate.

## 2022-02-11 NOTE — PROGRESS NOTES
Please let patient know CT scan shows evidence of mild uncomplicated diverticulitis-- she should continue on liquid diet and advance as tolerated; continue with antibiotics.     It also continues showing uterine fibroids along with a 1.9 cm left ovary cyst. I would recommend f/u with gynecology-- referral was placed.

## 2022-02-11 NOTE — TELEPHONE ENCOUNTER
Caller: Jana Page    Relationship: Self    Best call back number: [phone]    Who are you requesting to speak with (clinical staff, provider,  specific staff member): CLINICAL STAFF     What was the call regarding: WANTING RESULTS OF CT SCAN OF ABDOMIN     Do you require a callback: YES

## 2022-02-17 ENCOUNTER — TELEPHONE (OUTPATIENT)
Dept: INTERNAL MEDICINE | Facility: CLINIC | Age: 72
End: 2022-02-17

## 2022-02-17 DIAGNOSIS — K57.32 DIVERTICULITIS OF COLON: ICD-10-CM

## 2022-02-17 RX ORDER — METRONIDAZOLE 500 MG/1
TABLET ORAL
Qty: 28 TABLET | Refills: 0 | Status: CANCELLED | OUTPATIENT
Start: 2022-02-17

## 2022-02-17 RX ORDER — CIPROFLOXACIN 500 MG/1
TABLET, FILM COATED ORAL
Qty: 14 TABLET | Refills: 0 | Status: CANCELLED | OUTPATIENT
Start: 2022-02-17

## 2022-02-17 NOTE — TELEPHONE ENCOUNTER
Patient needs to finish present meds. Eat small appropriate meals. Another round of medications will not prevent diverticulitis. Continue to treat present flare up while out of town. No refills authorized at this time. HUB may share if patient calls.

## 2022-02-17 NOTE — TELEPHONE ENCOUNTER
Caller: Jana Page    Relationship: Self    Best call back number: [phone]    Requested Prescriptions:   Requested Prescriptions     Pending Prescriptions Disp Refills   • ciprofloxacin (Cipro) 500 MG tablet 14 tablet 0     Si po q12   • metroNIDAZOLE (Flagyl) 500 MG tablet 28 tablet 0     Si PO Q6 HOURS        Pharmacy where request should be sent: [facility] [address] [place] [address] [address] AT [facility] [address]  - [phone] [facility] [phone] FX     Additional details provided by patient: PATIENT SAID SHE IS FEELING BETTER, BUT SHE IS GOING OUT OF TOWN NEXT WEEK AND IS WORRIED ABOUT BEING WITHOUT MEDICATION. PLEASE CALL PATIENT AND ADVISE    Does the patient have less than a 3 day supply:  [x] Yes  [] No    Mignon Gamboa Rep   22 10:09 EST

## 2022-03-12 DIAGNOSIS — I10 BENIGN ESSENTIAL HYPERTENSION: ICD-10-CM

## 2022-03-14 RX ORDER — AMLODIPINE BESYLATE 5 MG/1
TABLET ORAL
Qty: 90 TABLET | Refills: 3 | Status: SHIPPED | OUTPATIENT
Start: 2022-03-14 | End: 2023-03-17

## 2022-03-23 DIAGNOSIS — I10 BENIGN ESSENTIAL HYPERTENSION: ICD-10-CM

## 2022-03-23 RX ORDER — OLMESARTAN MEDOXOMIL 40 MG/1
TABLET ORAL
Qty: 90 TABLET | Refills: 3 | Status: SHIPPED | OUTPATIENT
Start: 2022-03-23 | End: 2023-03-21

## 2022-03-24 ENCOUNTER — OFFICE VISIT (OUTPATIENT)
Dept: INTERNAL MEDICINE | Facility: CLINIC | Age: 72
End: 2022-03-24

## 2022-03-24 VITALS
DIASTOLIC BLOOD PRESSURE: 84 MMHG | TEMPERATURE: 98.2 F | HEART RATE: 60 BPM | OXYGEN SATURATION: 98 % | WEIGHT: 167.2 LBS | HEIGHT: 65 IN | SYSTOLIC BLOOD PRESSURE: 123 MMHG | BODY MASS INDEX: 27.86 KG/M2

## 2022-03-24 DIAGNOSIS — F41.9 ANXIETY: ICD-10-CM

## 2022-03-24 DIAGNOSIS — E11.9 TYPE 2 DIABETES MELLITUS WITHOUT COMPLICATION, WITHOUT LONG-TERM CURRENT USE OF INSULIN: ICD-10-CM

## 2022-03-24 DIAGNOSIS — I10 BENIGN ESSENTIAL HYPERTENSION: Primary | ICD-10-CM

## 2022-03-24 DIAGNOSIS — E78.00 HYPERCHOLESTEROLEMIA: ICD-10-CM

## 2022-03-24 PROCEDURE — 99214 OFFICE O/P EST MOD 30 MIN: CPT | Performed by: FAMILY MEDICINE

## 2022-03-24 NOTE — PROGRESS NOTES
"Chief Complaint  Hypertension and Prediabetes    Subjective          Jana Page presents to [facility]  History of Present Illness     Hypertension - stable.  Patient taking medication as prescribed.  Denies chest pain, shortness of breath, headache, lower extremity edema.  Patient is taking amlodipine,olmesartan, propranolol .     HLD-stable.  Patient taking atorvastatin as prescribed.  Trying to adhere to a low-fat diet.     Diabetes mellitus-stable.  No new numbness, tingling.  Patient is taking no meds currently.  Diet controlled.  Last A1c was 5.60.       Anxiety disorder-stable.  Patient is taking cymbalta and needs a refill.  The patient is not having any side effects.  Denies depression, poor concentration, poor sleep, fatigue, irritability.    Objective   Vital Signs:   /84 (BP Location: Left arm, Patient Position: Sitting, Cuff Size: Adult)   Pulse 60   Temp 98.2 °F (36.8 °C) (Temporal)   Ht 165.1 cm (65\")   Wt 75.8 kg (167 lb 3.2 oz)   SpO2 98%   BMI 27.82 kg/m²            Physical Exam  Vitals and nursing note reviewed.   Constitutional:       General: She is not in acute distress.     Appearance: Normal appearance.   Cardiovascular:      Rate and Rhythm: Normal rate and regular rhythm.      Heart sounds: Normal heart sounds. No murmur heard.  Pulmonary:      Effort: Pulmonary effort is normal.      Breath sounds: Normal breath sounds.   Neurological:      Mental Status: She is alert.        Result Review :   The following data was reviewed by: Sabino Trinidad MD on 03/24/2022:  Common labs    Common Labsle 9/17/21 9/17/21 9/17/21 9/17/21 12/14/21 2/10/22 2/10/22    1230 1230 1230 1230  1530 1530   Glucose  103 (A)     74   BUN  15     14   Creatinine  0.76   0.90  1.16 (A)   eGFR Non  Am  75     46 (A)   eGFR African Am  91        Sodium  139     142   Potassium  3.8     4.2   Chloride  103     104   Calcium  9.8     10.0   Total Protein  6.7     "    Albumin  5.10     4.70   Total Bilirubin  0.7     0.3   Alkaline Phosphatase  86     81   AST (SGOT)  30     24   ALT (SGPT)  33     27   WBC 9.27     9.40    Hemoglobin 14.7     14.5    Hematocrit 43.0     43.3    Platelets 293     331    Total Cholesterol   113       Triglycerides   100       HDL Cholesterol   44       LDL Cholesterol    50       Hemoglobin A1C    5.60      (A) Abnormal value       Comments are available for some flowsheets but are not being displayed.                     Assessment and Plan    Diagnoses and all orders for this visit:    1. Benign essential hypertension (Primary)    2. Type 2 diabetes mellitus without complication, without long-term current use of insulin (HCC)  -     Hemoglobin A1c    3. Hypercholesterolemia    4. Anxiety       Stable chronic conditions as above.  Continue all medications as above.  Labs today.        Follow Up   Return in about 6 months (around 10/3/2022) for Medicare Wellness.  Patient was given instructions and counseling regarding her condition or for health maintenance advice. Please see specific information pulled into the AVS if appropriate.

## 2022-03-25 LAB — HBA1C MFR BLD: 5.8 % (ref 4.8–5.6)

## 2022-03-28 ENCOUNTER — TELEPHONE (OUTPATIENT)
Dept: INTERNAL MEDICINE | Facility: CLINIC | Age: 72
End: 2022-03-28

## 2022-03-28 DIAGNOSIS — R42 LIGHTHEADEDNESS: ICD-10-CM

## 2022-03-28 DIAGNOSIS — F41.9 ANXIETY: ICD-10-CM

## 2022-03-28 RX ORDER — DULOXETIN HYDROCHLORIDE 60 MG/1
CAPSULE, DELAYED RELEASE ORAL
Qty: 90 CAPSULE | Refills: 3 | Status: SHIPPED | OUTPATIENT
Start: 2022-03-28

## 2022-03-28 NOTE — TELEPHONE ENCOUNTER
Caller: Jana Page    Relationship: Self    Best call back number: [phone]    What test was performed: BLOODWORK    When was the test performed: 3/24    Additional notes: PLEASE CALL PATIENT TO REVIEW TEST RESULTS, PLEASE DO NOT PUT ON MYCHART SHE DOES NOT USE IT. PLEASE LEAVE VOICEMAIL IF SHE DOES NOT ANSWER.

## 2022-04-15 ENCOUNTER — TELEPHONE (OUTPATIENT)
Dept: INTERNAL MEDICINE | Facility: CLINIC | Age: 72
End: 2022-04-15

## 2022-04-15 NOTE — TELEPHONE ENCOUNTER
Caller: Jana Page    Relationship: Self    Best call back number: [phone]    What medication are you requesting: SOMETHING FOR PAIN     What are your current symptoms: BACK PAIN, UP INTO NECK AND GIVING HER HEADACHES    How long have you been experiencing symptoms: LAST COUPLE OF WEEKS     Have you had these symptoms before:    [] Yes  [x] No    Have you been treated for these symptoms before:   [] Yes  [x] No    If a prescription is needed, what is your preferred pharmacy and phone number:      [facility] [place] [address] AT [address] - [phone]  - [phone] FX    Additional notes:

## 2022-04-15 NOTE — TELEPHONE ENCOUNTER
Left msg on the pt's voicemail pt will need to make an appt to be evaluated and treated for her pain    OK FOR HUB TO READ

## 2022-04-26 ENCOUNTER — ANESTHESIA EVENT (OUTPATIENT)
Dept: GASTROENTEROLOGY | Facility: HOSPITAL | Age: 72
End: 2022-04-26

## 2022-04-26 ENCOUNTER — ANESTHESIA (OUTPATIENT)
Dept: GASTROENTEROLOGY | Facility: HOSPITAL | Age: 72
End: 2022-04-26

## 2022-04-26 ENCOUNTER — HOSPITAL ENCOUNTER (OUTPATIENT)
Facility: HOSPITAL | Age: 72
Setting detail: HOSPITAL OUTPATIENT SURGERY
Discharge: HOME | End: 2022-04-26
Attending: INTERNAL MEDICINE | Admitting: INTERNAL MEDICINE

## 2022-04-26 VITALS
BODY MASS INDEX: 26.48 KG/M2 | HEIGHT: 66 IN | DIASTOLIC BLOOD PRESSURE: 86 MMHG | SYSTOLIC BLOOD PRESSURE: 125 MMHG | WEIGHT: 164.8 LBS | OXYGEN SATURATION: 99 % | HEART RATE: 58 BPM | RESPIRATION RATE: 16 BRPM

## 2022-04-26 PROCEDURE — G0121 COLON CA SCRN NOT HI RSK IND: HCPCS | Performed by: INTERNAL MEDICINE

## 2022-04-26 PROCEDURE — 25010000002 PROPOFOL 10 MG/ML EMULSION: Performed by: ANESTHESIOLOGY

## 2022-04-26 RX ORDER — SODIUM CHLORIDE, SODIUM LACTATE, POTASSIUM CHLORIDE, CALCIUM CHLORIDE 600; 310; 30; 20 MG/100ML; MG/100ML; MG/100ML; MG/100ML
1000 INJECTION, SOLUTION INTRAVENOUS CONTINUOUS
Status: DISCONTINUED | OUTPATIENT
Start: 2022-04-26 | End: 2022-04-26 | Stop reason: HOSPADM

## 2022-04-26 RX ORDER — PROPOFOL 10 MG/ML
VIAL (ML) INTRAVENOUS AS NEEDED
Status: DISCONTINUED | OUTPATIENT
Start: 2022-04-26 | End: 2022-04-26 | Stop reason: SURG

## 2022-04-26 RX ORDER — PROPOFOL 10 MG/ML
VIAL (ML) INTRAVENOUS CONTINUOUS PRN
Status: DISCONTINUED | OUTPATIENT
Start: 2022-04-26 | End: 2022-04-26 | Stop reason: SURG

## 2022-04-26 RX ORDER — LIDOCAINE HYDROCHLORIDE 20 MG/ML
INJECTION, SOLUTION INFILTRATION; PERINEURAL AS NEEDED
Status: DISCONTINUED | OUTPATIENT
Start: 2022-04-26 | End: 2022-04-26 | Stop reason: SURG

## 2022-04-26 RX ADMIN — Medication 200 MCG/KG/MIN: at 07:06

## 2022-04-26 RX ADMIN — SODIUM CHLORIDE, POTASSIUM CHLORIDE, SODIUM LACTATE AND CALCIUM CHLORIDE 1000 ML: 600; 310; 30; 20 INJECTION, SOLUTION INTRAVENOUS at 06:33

## 2022-04-26 RX ADMIN — PROPOFOL 100 MG: 10 INJECTION, EMULSION INTRAVENOUS at 07:06

## 2022-04-26 RX ADMIN — LIDOCAINE HYDROCHLORIDE 60 MG: 20 INJECTION, SOLUTION INFILTRATION; PERINEURAL at 07:07

## 2022-04-26 NOTE — ANESTHESIA PREPROCEDURE EVALUATION
Anesthesia Evaluation     Patient summary reviewed and Nursing notes reviewed   NPO Solid Status: > 8 hours  NPO Liquid Status: > 4 hours           Airway   Mallampati: II  Neck ROM: full  No difficulty expected  Dental - normal exam     Pulmonary     breath sounds clear to auscultation  Cardiovascular     Rhythm: regular    (+) hypertension, dysrhythmias, hyperlipidemia,       Neuro/Psych  (+) headaches, dizziness/light headedness, psychiatric history Anxiety,    GI/Hepatic/Renal/Endo    (+)   liver disease,     Musculoskeletal     Abdominal    Substance History      OB/GYN          Other   arthritis,                      Anesthesia Plan    ASA 2     MAC     intravenous induction     Anesthetic plan, all risks, benefits, and alternatives have been provided, discussed and informed consent has been obtained with: patient.        CODE STATUS:

## 2022-04-26 NOTE — DISCHARGE INSTRUCTIONS
For the next 24 hours patient needs to be with a responsible adult.    For 24 hours DO NOT drive, operate machinery, appliances, drink alcohol, make important decisions or sign legal documents.    Start with a light or bland diet if you are feeling sick to your stomach otherwise advance to regular diet as tolerated.    Follow recommendations on procedure report if provided by your doctor.    Call Dr Contreras for problems [phone]    Problems may include but not limited to: large amounts of bleeding, trouble breathing, repeated vomiting, severe unrelieved pain, fever or chills.

## 2022-04-26 NOTE — H&P
[facility]  Pre Procedure History & Physical    Chief Complaint:   Time for my colonoscopy    Subjective     HPI:   71 y.o. female who is here for a screening colonoscopy.    Past Medical History:   Past Medical History:   Diagnosis Date   • Anxiety    • Aortic atherosclerosis (HCC)    • DDD (degenerative disc disease), cervical    • Diverticulitis of large intestine    • Diverticulosis of intestine    • Fibrocystic breast changes    • Hyperlipidemia    • Hypertension    • Impaired fasting blood sugar    • Lateral epicondylitis of right elbow    • Lightheadedness 2/10/2017   • Lipoma of left lower extremity    • New daily persistent headache 3/9/2017   • Osteoarthritis of knee    • Osteopenia    • Pancreatic cyst     NO CHANGE IN 2012 FROM 2009   • Post-menopausal    • Scoliosis    • Steatosis of liver    • Uterine leiomyoma        Family History:  Family History   Problem Relation Age of Onset   • Hypertension Mother    • Osteoarthritis Mother    • Breast cancer Mother         in situ   • Stroke Mother    • Diabetes Mother    • Atrial fibrillation Mother    • Heart attack Father 38   • Coronary artery disease Father         CABG   • Colon polyps Sister        Social History:   reports that she quit smoking about 51 years ago. Her smoking use included cigarettes. She has a 10.00 pack-year smoking history. She has never used smokeless tobacco. She reports current alcohol use. Drug use questions deferred to the physician.    Medications:   Medications Prior to Admission   Medication Sig Dispense Refill Last Dose   • amLODIPine (NORVASC) 5 MG tablet TAKE ONE TABLET BY MOUTH DAILY 90 tablet 3 4/25/2022 at Unknown time   • atorvastatin (LIPITOR) 20 MG tablet Take 1 tablet by mouth Daily. 90 tablet 3 4/25/2022 at Unknown time   • Cholecalciferol (VITAMIN D) 1000 UNITS tablet Take  by mouth.   4/25/2022 at Unknown time   • Coenzyme Q10 (CO Q 10) 10 MG capsule Take  by mouth Daily.   4/25/2022 at  "Unknown time   • DULoxetine (CYMBALTA) 60 MG capsule TAKE ONE CAPSULE BY MOUTH DAILY 90 capsule 3 4/25/2022 at Unknown time   • fluticasone (FLONASE) 50 MCG/ACT nasal spray 2 sprays into the nostril(s) as directed by provider Daily. Administer 2 sprays in each nostril for each dose.   4/25/2022 at Unknown time   • Multiple Vitamin (MULTIVITAMIN) capsule Take 1 capsule by mouth Daily.   4/25/2022 at Unknown time   • olmesartan (BENICAR) 40 MG tablet TAKE ONE TABLET BY MOUTH DAILY 90 tablet 3 4/25/2022 at Unknown time   • Probiotic Product (PRO-BIOTIC BLEND) capsule Take  by mouth Daily.   4/25/2022 at Unknown time   • propranolol (INDERAL) 60 MG tablet TAKE ONE TABLET BY MOUTH TWICE A  tablet 3 4/25/2022 at Unknown time   • Accu-Chek FastClix Lancets misc Use to check blood sugar once daily 100 each 2 Unknown at Unknown time   • aspirin 81 MG EC tablet Take 81 mg by mouth As Needed.   More than a month at Unknown time   • ciprofloxacin (Cipro) 500 MG tablet 1 po q12 14 tablet 0    • glucose blood test strip Use to check blood sugar once daily 100 each 3 Unknown at Unknown time   • metroNIDAZOLE (Flagyl) 500 MG tablet 1 PO Q6 HOURS 28 tablet 0    • prednisoLONE acetate (PRED FORTE) 1 % ophthalmic suspension           Allergies:  Amoxicillin-pot clavulanate and Ampicillin    ROS:    Pertinent items are noted in HPI     Objective     Blood pressure 137/92, pulse 66, resp. rate 16, height 167.6 cm (66\"), weight 74.8 kg (164 lb 12.8 oz), SpO2 96 %.    Physical Exam   Constitutional: Pt is oriented to person, place, and time and well-developed, well-nourished, and in no distress.   HENT:   Mouth/Throat: Oropharynx is clear and moist.   Neck: Normal range of motion. Neck supple.   Cardiovascular: Normal rate, regular rhythm and normal heart sounds.    Pulmonary/Chest: Effort normal and breath sounds normal. No respiratory distress. No  wheezes.   Abdominal: Soft. Bowel sounds are normal.   Skin: Skin is warm and dry. "   Psychiatric: Mood, memory, affect and judgment normal.     Assessment/Plan     Diagnosis:  71 y.o. female who is here for a screening colonoscopy.    Anticipated Surgical Procedure:  Colonoscopy    The risks, benefits, and alternatives of this procedure have been discussed with the patient or the responsible party- the patient understands and agrees to proceed.    Jay Contreras M.D.

## 2022-04-26 NOTE — ANESTHESIA POSTPROCEDURE EVALUATION
"Patient: Jana Page    Procedure Summary     Date: 04/26/22 Room / Location: [facility] ENDOSCOPY 5 /  [facility]    Anesthesia Start: 0701 Anesthesia Stop: 0728    Procedure: COLONOSCOPY TO CECUM AND TERMINAL ILEUM (N/A ) Diagnosis:       Screen for colon cancer      (Screen for colon cancer [Z12.11])    Surgeons: Jay Contreras MD Provider: Austin Morales MD    Anesthesia Type: MAC ASA Status: 2          Anesthesia Type: MAC    Vitals  Vitals Value Taken Time   /86 04/26/22 0752   Temp     Pulse 58 04/26/22 0752   Resp 16 04/26/22 0752   SpO2 99 % 04/26/22 0752           Post Anesthesia Care and Evaluation    Patient location during evaluation: bedside  Patient participation: complete - patient participated  Level of consciousness: sleepy but conscious  Pain score: 0  Pain management: adequate  Airway patency: patent  Anesthetic complications: No anesthetic complications    Cardiovascular status: acceptable  Respiratory status: acceptable  Hydration status: acceptable    Comments: /86 (BP Location: Left arm, Patient Position: Lying)   Pulse 58   Resp 16   Ht 167.6 cm (66\")   Wt 74.8 kg (164 lb 12.8 oz)   SpO2 99%   BMI 26.60 kg/m²         "

## 2022-04-28 DIAGNOSIS — E78.00 HYPERCHOLESTEROLEMIA: ICD-10-CM

## 2022-04-29 RX ORDER — ATORVASTATIN CALCIUM 20 MG/1
TABLET, FILM COATED ORAL
Qty: 90 TABLET | Refills: 3 | Status: SHIPPED | OUTPATIENT
Start: 2022-04-29

## 2022-05-10 ENCOUNTER — OFFICE VISIT (OUTPATIENT)
Dept: INTERNAL MEDICINE | Facility: CLINIC | Age: 72
End: 2022-05-10

## 2022-05-10 VITALS
OXYGEN SATURATION: 97 % | DIASTOLIC BLOOD PRESSURE: 80 MMHG | TEMPERATURE: 97.3 F | HEART RATE: 67 BPM | WEIGHT: 162.4 LBS | BODY MASS INDEX: 26.1 KG/M2 | HEIGHT: 66 IN | SYSTOLIC BLOOD PRESSURE: 128 MMHG

## 2022-05-10 DIAGNOSIS — M54.2 CHRONIC NECK PAIN: Primary | ICD-10-CM

## 2022-05-10 DIAGNOSIS — G89.29 CHRONIC MIDLINE LOW BACK PAIN WITHOUT SCIATICA: ICD-10-CM

## 2022-05-10 DIAGNOSIS — G89.29 CHRONIC NECK PAIN: Primary | ICD-10-CM

## 2022-05-10 DIAGNOSIS — A08.4 VIRAL GASTROENTERITIS: ICD-10-CM

## 2022-05-10 DIAGNOSIS — M54.50 CHRONIC MIDLINE LOW BACK PAIN WITHOUT SCIATICA: ICD-10-CM

## 2022-05-10 PROCEDURE — 99213 OFFICE O/P EST LOW 20 MIN: CPT | Performed by: FAMILY MEDICINE

## 2022-05-10 NOTE — PROGRESS NOTES
"Chief Complaint  Back Pain, Neck Pain, Diarrhea, and stomach burning     Subjective          Jana Page presents to [facility]  History of Present Illness     This patient is being seen today for back and neck pain complaints.    She has a history of DDD on multiple levels and neck pain which has been exacerbated by picking up toddlers.  Lower back most of the problem.  She is having some back pain.  I cannot find any previous imaging in my files.  She is able to ambulate okay.  No red flags.    She had some nausea with vomiting over the weekend.  It came on suddenly and then was gone within 24 hours.  She later developed some diarrhea, anorexia, and right upper quadrant burning feeling.  No fevers or chills.  She has a history of diverticulitis but sounds like it does not usually behave in this manner with nausea and vomiting that comes on suddenly and goes away with diarrhea following.      Objective   Vital Signs:  /80 (BP Location: Left arm, Patient Position: Sitting, Cuff Size: Adult)   Pulse 67   Temp 97.3 °F (36.3 °C) (Temporal)   Ht 167.6 cm (66\")   Wt 73.7 kg (162 lb 6.4 oz)   SpO2 97%   BMI 26.21 kg/m²           Physical Exam  Vitals and nursing note reviewed.   Constitutional:       Appearance: Normal appearance.   Musculoskeletal:      Cervical back: Spasms present.      Thoracic back: Deformity and spasms present.      Lumbar back: Spasms present. No tenderness or bony tenderness.      Comments: Some scoliosis noted on palpation of the spine   Neurological:      Mental Status: She is alert.        Result Review :                 Assessment and Plan    Diagnoses and all orders for this visit:    1. Chronic neck pain (Primary)  -     XR Spine Cervical Complete 4 or 5 View; Future  -     Ambulatory Referral to Physical Therapy Evaluate and treat    2. Chronic midline low back pain without sciatica  -     XR Spine Lumbar Complete 4+VW; Future  -     " Ambulatory Referral to Physical Therapy Evaluate and treat    3. Viral gastroenteritis      I will get some x-rays of the cervical and lumbar spine and put her in for some physical therapy to see if that would help.  I imagine the repeated lifting of the child with likely poor form has exacerbated her neck and back pain.  If no improvement with the physical therapy, could consider MRI.  For now, Tylenol would be fine for discomfort.    Regarding the GI complaints, not as likely diverticulitis given the N/V have improved and the diarrhea has been going.  No blood in stool.  More likely viral gastroenteritis.  I told the patient that if her symptoms worsen and are not gradually improving, to let me know and we could consider empiric therapy for diverticulitis.  I explained to her that giving antibiotics at this point if it is viral gastroenteritis would make her symptoms worse.    Patient has been erroneously marked as diabetic. Based on the available clinical information, she does not have diabetes and should therefore be excluded from diabetic health maintenance and quality measures for the remainder of the reporting period.           Follow Up   Return if symptoms worsen or fail to improve.  Patient was given instructions and counseling regarding her condition or for health maintenance advice. Please see specific information pulled into the AVS if appropriate.

## 2022-05-11 ENCOUNTER — HOSPITAL ENCOUNTER (OUTPATIENT)
Dept: GENERAL RADIOLOGY | Facility: HOSPITAL | Age: 72
Discharge: HOME | End: 2022-05-11

## 2022-05-11 ENCOUNTER — TELEPHONE (OUTPATIENT)
Dept: INTERNAL MEDICINE | Facility: CLINIC | Age: 72
End: 2022-05-11

## 2022-05-11 DIAGNOSIS — G89.29 CHRONIC MIDLINE LOW BACK PAIN WITHOUT SCIATICA: ICD-10-CM

## 2022-05-11 DIAGNOSIS — M54.50 CHRONIC MIDLINE LOW BACK PAIN WITHOUT SCIATICA: ICD-10-CM

## 2022-05-11 DIAGNOSIS — G89.29 CHRONIC NECK PAIN: ICD-10-CM

## 2022-05-11 DIAGNOSIS — M54.2 CHRONIC NECK PAIN: ICD-10-CM

## 2022-05-11 PROCEDURE — 72110 X-RAY EXAM L-2 SPINE 4/>VWS: CPT

## 2022-05-11 PROCEDURE — 72050 X-RAY EXAM NECK SPINE 4/5VWS: CPT

## 2022-05-11 NOTE — TELEPHONE ENCOUNTER
Caller: Jana Page    Relationship: Self    Best call back number:   OK TO LEAVE A VOICEMAIL    What orders are you requesting (i.e. lab or imaging): PHYSICAL THERAPY    In what timeframe would the patient need to come in:     Where will you receive your lab/imaging services:     Additional notes: PATIENT IS CALLING IN TO GET A ORDER TO HAVE PHYSICAL THERAPY.  PATIENT SAYS SHE CAN COME IN TO PICK THE PHYSICAL THERAPY ORDER UP ONCE IT IS READY.

## 2022-05-11 NOTE — TELEPHONE ENCOUNTER
She saw me yesterday and PT was ordered.  She was meant to get the order at checkout.  They can fax the order wherever she needs it.

## 2022-05-12 PROBLEM — M41.26 OTHER IDIOPATHIC SCOLIOSIS, LUMBAR REGION: Chronic | Status: ACTIVE | Noted: 2022-05-12

## 2022-05-20 ENCOUNTER — TELEPHONE (OUTPATIENT)
Dept: INTERNAL MEDICINE | Facility: CLINIC | Age: 72
End: 2022-05-20

## 2022-05-20 NOTE — TELEPHONE ENCOUNTER
Caller: Jana Page    Relationship: Self    Best call back number: [phone]     What test was performed: XRAY    When was the test performed: 5/11/22    PATIENT CALLED REQUESTING A CALL BACK ON THE RESULTS OF HER XRAY. PLEASE CALL AND ADVISE.

## 2022-05-20 NOTE — TELEPHONE ENCOUNTER
Advised patient of results and comments.  Patient was agreeable to start Physical Therapy and has it scheduled.

## 2022-06-13 ENCOUNTER — TREATMENT (OUTPATIENT)
Dept: PHYSICAL THERAPY | Facility: CLINIC | Age: 72
End: 2022-06-13

## 2022-06-13 DIAGNOSIS — G89.29 CHRONIC BILATERAL LOW BACK PAIN WITHOUT SCIATICA: Primary | ICD-10-CM

## 2022-06-13 DIAGNOSIS — M54.2 PAIN, NECK: ICD-10-CM

## 2022-06-13 DIAGNOSIS — M54.50 CHRONIC BILATERAL LOW BACK PAIN WITHOUT SCIATICA: Primary | ICD-10-CM

## 2022-06-13 DIAGNOSIS — M41.9 SCOLIOSIS OF THORACOLUMBAR SPINE, UNSPECIFIED SCOLIOSIS TYPE: ICD-10-CM

## 2022-06-13 PROCEDURE — 97110 THERAPEUTIC EXERCISES: CPT | Performed by: PHYSICAL THERAPIST

## 2022-06-13 PROCEDURE — 97162 PT EVAL MOD COMPLEX 30 MIN: CPT | Performed by: PHYSICAL THERAPIST

## 2022-06-13 NOTE — PROGRESS NOTES
Physical Therapy Initial Evaluation and Plan of Care      Patient: Jana Page   : 1950  Diagnosis/ICD-10 Code:  Chronic bilateral low back pain without sciatica [M54.50, G89.29]  Referring practitioner: Sabino Trinidad MD  Date of Initial Visit: 2022  Today's Date: 2022  Patient seen for 1 sessions           Subjective Evaluation    History of Present Illness  Date of onset: 3/13/2022  Mechanism of injury: Jana reports that she has had back pain off and on over the years. Over the past 2-3 months, she has had an increase in her pain. She stays busy and active, but not purposeful exercise. Used to do yoga here but it started bothering here. Hasn't come much to the gym during the pandemic. She does golf, makes it sore, and tennis once a week for each. She watches [employer] about 1x a week. Her pain is across the LB to the hips, R side of the neck and does get headaches (once a week). Back pain is there for the whole day once it starts, nothing much helps the pain (sometimes heat and lying down help).       Patient Occupation: retired  TA Quality of life: good    Pain  Current pain ratin  At worst pain ratin  Location: R side of neck, LB  Quality: dull ache and tight  Relieving factors: heat and relaxation (lying supine)  Aggravating factors: prolonged positioning and lifting (longer car rides)  Progression: no change    Social Support  Patient lives at: patio home, bedroom on first floor.  Lives with: alone (dog, )    Hand dominance: right    Diagnostic Tests  X-ray: abnormal    Patient Goals  Patient goals for therapy: decreased pain, increased strength and return to sport/leisure activities             Objective          Static Posture     Head  Forward.    Lumbar Spine   Lumbar spine (Left): Convex curve.     Pelvis   Pelvis (Right): Elevated.     Palpation   Left   Hypertonic in the levator scapulae, suboccipitals and upper trapezius.   Tenderness  of the levator scapulae.     Right   Hypertonic in the erector spinae, cervical paraspinals, levator scapulae, lumbar paraspinals, quadratus lumborum, suboccipitals and upper trapezius. Tenderness of the erector spinae, cervical paraspinals, levator scapulae, lumbar paraspinals, quadratus lumborum, suboccipitals and upper trapezius.   Trigger point to levator scapulae and upper trapezius.     Neurological Testing     Sensation     Lumbar   Left   Intact: light touch    Right   Intact: light touch    Active Range of Motion     Additional Active Range of Motion Details  Cervical AROM:  Flexion=70%  Extension=40%  R rotation=50%  L rotation=60%  R lateral flexion=30%  L lateral flexion=50%, pulling on the R  Lumbar AROM:  Flexion=50%  Extension=25%, pain across lower lumbar  R lateral flexion=25%, some R sided pain  L lateral flexion=40%, stretching on the R  Rotation=40%, no increased pain    Passive Range of Motion     Additional Passive Range of Motion Details  Decreased mobility of the lower cervical without increased pain    Strength/Myotome Testing     Left Shoulder     Planes of Motion   Flexion: 4   Abduction: 4+   External rotation at 0°: 4   Internal rotation at 0°: 4+     Right Shoulder     Planes of Motion   Flexion: 4   Abduction: 4+   External rotation at 0°: 4   Internal rotation at 0°: 4+     Left Elbow   Flexion: 4+  Extension: 4    Right Elbow   Flexion: 4+  Extension: 4    Left Hip   Planes of Motion   Flexion: 4  Abduction: 4  Adduction: 4+  External rotation: 4+    Right Hip   Planes of Motion   Flexion: 4  Abduction: 4  Adduction: 4+  External rotation: 4+    Left Knee   Flexion: 4+  Extension: 4+    Right Knee   Flexion: 4+  Extension: 4+    Left Ankle/Foot   Dorsiflexion: 4+    Right Ankle/Foot   Dorsiflexion: 4+    Muscle Activation     Additional Muscle Activation Details  Core strength 4/5    Tests   Cervical     Left   Negative Spurling's sign.     Right   Negative Spurling's sign.      Lumbar     Left   Negative passive SLR.     Right   Negative passive SLR.     Ambulation     Observational Gait   Gait: antalgic     Quality of Movement During Gait   Trunk    Trunk (Right): Positive lateral lean over stance limb.         Exercise:  -ab brace (exhalation with TA contraction) 10x 5 sec  -LTR 10x 5 sec  -hip add iso w/ball and ab brace 10x 5 sec    Education on scoliosis, showed patient her xrays, explained the asymmetries in her spine and soft tissue accommodations.      Functional Outcome Score:   Oswestry Back Index=30%  Neck Disability Index=26%        Assessment & Plan     Assessment  Impairments: abnormal or restricted ROM, activity intolerance, impaired physical strength, lacks appropriate home exercise program and pain with function  Functional Limitations: carrying objects, lifting, walking, uncomfortable because of pain, standing and unable to perform repetitive tasks  Assessment details: Jana Page is a 71 y.o. year-old female referred to physical therapy for back and neck pain. She presents with a evolving clinical presentation.  She has comorbidities of scoliosis, DDD, and no personal factors that may affect her progress in the plan of care.  Jana has decreased lumbar and cervical AROM, decreased core/LE/UE strength, and tightness of the lumbar and R cervical musculature. Pt would benefit from therapy to help improve her ability to perform ADLs, play with grandkids, and get back to working out with decreased pain.   Prognosis: good    Goals  Plan Goals: ST wks  1. Patient will be independent with education for symptom management, joint protection and strategies to minimize stress on affected tissues  2. Pt to improve pain complaints to no more than 4/10 with ADLs    LT wks  1. Pt to improve pain complaints to no more than 2/10 with ADLs  2. Pt to improve trunk, UE, and LE strength by 1/2 to 1 MMT grade  3. Pt to improve Oswestry Back index score from 30% to 20% for  overall functional improvement  4. Pt to improve score on Neck Disability Index from 26% to 16% for reduction of pain and headaches  5. Pt to be independent with HEP for ROM, flexibility and core strength    Plan  Therapy options: will be seen for skilled therapy services  Planned modality interventions: cryotherapy, electrical stimulation/Russian stimulation, TENS, traction, ultrasound, dry needling and thermotherapy (hydrocollator packs)  Planned therapy interventions: abdominal trunk stabilization, ADL retraining, body mechanics training, flexibility, functional ROM exercises, home exercise program, IADL retraining, joint mobilization, manual therapy, neuromuscular re-education, postural training, soft tissue mobilization, spinal/joint mobilization, strengthening, stretching and therapeutic activities  Frequency: 2x week  Duration in weeks: 12  Treatment plan discussed with: patient        Timed:  Manual Therapy:         mins  34318;  Therapeutic Exercise:    15     mins  89806;     Neuromuscular Divina:        mins  46837;    Therapeutic Activity:          mins  03518;     Gait Training:           mins  64165;     Ultrasound:          mins  99381;    Iontophoresis         mins 29222  Dry Needling        mins 52296/ 20561 (Self-pay)      Untimed:  Electrical Stimulation:         mins  86811 ( );  Traction:       mins  46894;   Low Eval          Mins  63613  Mod Eval     30     Mins  99761  High Eval                            Mins  27106    Timed Treatment:   15   mins   Total Treatment:     45   mins    PT SIGNATURE: Janiya Perkins PT, CDNT    License Number: [id number]    Electronically signed by Janiya Perkins PT, 06/13/22, 8:20 AM EDT    DATE TREATMENT INITIATED: 6/13/2022    Initial Certification  Certification Period: 9/11/2022  I certify that the therapy services are furnished while this patient is under my care.  The services outlined above are required by this patient, and will be reviewed every  90 days.     PHYSICIAN: Sabino Trinidad MD   NPI: [id number]                                         DATE:     Please sign and return via fax to [phone] Thank you, [facility].

## 2022-06-23 ENCOUNTER — TREATMENT (OUTPATIENT)
Dept: PHYSICAL THERAPY | Facility: CLINIC | Age: 72
End: 2022-06-23

## 2022-06-23 DIAGNOSIS — G89.29 CHRONIC BILATERAL LOW BACK PAIN WITHOUT SCIATICA: Primary | ICD-10-CM

## 2022-06-23 DIAGNOSIS — M54.2 PAIN, NECK: ICD-10-CM

## 2022-06-23 DIAGNOSIS — M54.50 CHRONIC BILATERAL LOW BACK PAIN WITHOUT SCIATICA: Primary | ICD-10-CM

## 2022-06-23 DIAGNOSIS — M41.9 SCOLIOSIS OF THORACOLUMBAR SPINE, UNSPECIFIED SCOLIOSIS TYPE: ICD-10-CM

## 2022-06-23 PROCEDURE — 97110 THERAPEUTIC EXERCISES: CPT | Performed by: PHYSICAL THERAPIST

## 2022-06-23 PROCEDURE — 97530 THERAPEUTIC ACTIVITIES: CPT | Performed by: PHYSICAL THERAPIST

## 2022-06-23 NOTE — PROGRESS NOTES
Physical Therapy Daily Treatment Note    Patient: Jana Page   : 1950  Diagnosis/ICD-10 Code:  Chronic bilateral low back pain without sciatica [M54.50, G89.29]  Referring practitioner: Sabino Trinidad MD  Date of Initial Visit: Type: THERAPY  Noted: 2022  Today's Date: 2022  Patient seen for 2 sessions           Subjective the back really hasn't been hurting me as much over the past few weeks.     Objective     Exercise:  -ab brace (exhalation with TA contraction) 2x10, 5 sec  -LTR 10x 5 sec  -hip add iso w/ball and ab brace 2x10, 5 sec  -bridge 2x10  -hip abd/ER with blue band, 2x10 B and 1x10 single leg  -piriformis stretching 3x20 sec  -4 inch fwd and lateral step ups with hold for 3-5 sec, x10 B each       Assessment/Plan  Review of initial HEP, cues for form with TA contraction. Progressing core strength and balance         Timed:    Manual Therapy:         mins  57186;  Therapeutic Exercise:    32     mins  42936;     Neuromuscular Divina:        mins  23618;    Therapeutic Activity:     10     mins  43858;     Gait Training:           mins  78115;     Ultrasound:          mins  49697;    Electrical Stimulation:         mins  85854 ( );  Iontophoresis         mins 71008;  Aquatic Therapy         mins 13684;  Dry Needling                   mins 99788/  (Self-pay)    Untimed:  Electrical Stimulation:         mins  05948 ( );  Traction:         mins  61946;     Timed Treatment:   42   mins   Total Treatment:     42   mins    Janiya Perkins PT, CDNT  Physical Therapist    [place] License:[id number]

## 2022-07-18 ENCOUNTER — TREATMENT (OUTPATIENT)
Dept: PHYSICAL THERAPY | Facility: CLINIC | Age: 72
End: 2022-07-18

## 2022-07-18 DIAGNOSIS — M41.9 SCOLIOSIS OF THORACOLUMBAR SPINE, UNSPECIFIED SCOLIOSIS TYPE: ICD-10-CM

## 2022-07-18 DIAGNOSIS — M54.2 PAIN, NECK: ICD-10-CM

## 2022-07-18 DIAGNOSIS — G89.29 CHRONIC BILATERAL LOW BACK PAIN WITHOUT SCIATICA: Primary | ICD-10-CM

## 2022-07-18 DIAGNOSIS — M54.50 CHRONIC BILATERAL LOW BACK PAIN WITHOUT SCIATICA: Primary | ICD-10-CM

## 2022-07-18 PROCEDURE — 97112 NEUROMUSCULAR REEDUCATION: CPT | Performed by: PHYSICAL THERAPIST

## 2022-07-18 PROCEDURE — 97110 THERAPEUTIC EXERCISES: CPT | Performed by: PHYSICAL THERAPIST

## 2022-07-18 NOTE — PROGRESS NOTES
30-Day / 10-Visit Progress Note         Patient: Jana Page   : 1950  Diagnosis/ICD-10 Code:  Chronic bilateral low back pain without sciatica [M54.50, G89.29]  Referring practitioner: Sabino Trinidad MD  Date of Initial Visit: Type: THERAPY  Noted: 2022  Today's Date: 2022  Patient seen for 3 sessions      Subjective:     Clinical Progress: improved  Home Program Compliance: Yes  Treatment has included:  therapeutic exercise, manual therapy, therapeutic activity, neuro-muscular retraining  and patient education with home exercise program     Subjective over the last week I have had very little pain. It does hurt some when I  the grandkids, but the frequency is less. Neck and back are both better, pain still can be 4-5/10, but not as often  Objective        Exercise:  -ab brace (exhalation with TA contraction) 2x10, 5 sec  -LTR 10x 5 sec  -hip add iso w/ball and ab brace 2x10, 5 sec (cues for TA contraction)  -bridge 2x10  -hip abd/ER with blue band, 2x10 B and 1x10 single leg  -supine shoulder horiz abd and D2 flexion, green band x20 each (cues to activate core)  -piriformis stretching 3x20 sec  -4 inch fwd and lateral step ups with hold for 3-5 sec, x10 B each  DEFER    Functional Outcome Score:   Oswestry Back Index=22%  Neck Disability Index=14%    Assessment & Plan     Assessment    Assessment details: Jana Page has been seen for 3 physical therapy sessions for neck and back pain.  Treatment has included therapeutic exercise, manual therapy, therapeutic activity, neuro-muscular retraining  and patient education with home exercise program . Progress to physical therapy goals is good. She is having less frequent episodes of back and neck pain, still painful with lifting, bending over, and after playing golf (sometimes). She will benefit from continued skilled physical therapy to address remaining impairments and functional limitations.     Prognosis:  no edema,  no murmurs,  regular rate and rhythm , no edema. , no edema, no murmurs, regular rate and rhythm good    Goals  Plan Goals: ST wks  1. Patient will be independent with education for symptom management, joint protection and strategies to minimize stress on affected tissues (PART MET)   2. Pt to improve pain complaints to no more than 4/10 with ADLs (PART MET)     LT wks  1. Pt to improve pain complaints to no more than 2/10 with ADLs (ONGOING)   2. Pt to improve trunk, UE, and LE strength by 1/2 to 1 MMT grade (ONGOING)   3. Pt to improve Oswestry Back index score from 30% to 20% for overall functional improvement (ONGOING) improved to 22%  4. Pt to improve score on Neck Disability Index from 26% to 16% for reduction of pain and headaches (MET) improved to 14%  5. Pt to be independent with HEP for ROM, flexibility and core strength (ONGOING)     Plan  Therapy options: will be seen for skilled therapy services  Frequency: 2x week  Duration in weeks: 8  Treatment plan discussed with: patient           Recommendations: Continue as planned  Timeframe: 2 months  Prognosis to achieve goals: good    PT Signature: Janiya Perkins PT, CDNT    License Number: [id number]    Electronically signed by Janiya ePrkins PT, 22, 8:14 AM EDT      Based upon review of the patient's progress and continued therapy plan, it is my medical opinion that Jana Page should continue physical therapy treatment at [facility]  [address] [place] [address] [address] [place] [address]  [phone].    Signature: __________________________________  Sabino Trinidad MD    Timed:  Manual Therapy:         mins  89827;  Therapeutic Exercise:    30     mins  04988;     Neuromuscular Divina:  12      mins  52651;    Therapeutic Activity:          mins  13629;     Gait Training:           mins  94597;     Ultrasound:          mins  09952;    Iontophoresis         mins 42316;  Dry Needling                   mins / (Self-pay)    Untimed:  Electrical Stimulation:         mins  37423 (  );  Traction:         mins  64911;     Timed Treatment:   42   mins   Total Treatment:     42   mins

## 2022-08-01 ENCOUNTER — TREATMENT (OUTPATIENT)
Dept: PHYSICAL THERAPY | Facility: CLINIC | Age: 72
End: 2022-08-01

## 2022-08-01 DIAGNOSIS — M54.2 PAIN, NECK: ICD-10-CM

## 2022-08-01 DIAGNOSIS — M54.50 CHRONIC BILATERAL LOW BACK PAIN WITHOUT SCIATICA: Primary | ICD-10-CM

## 2022-08-01 DIAGNOSIS — G89.29 CHRONIC BILATERAL LOW BACK PAIN WITHOUT SCIATICA: Primary | ICD-10-CM

## 2022-08-01 DIAGNOSIS — M41.9 SCOLIOSIS OF THORACOLUMBAR SPINE, UNSPECIFIED SCOLIOSIS TYPE: ICD-10-CM

## 2022-08-01 PROCEDURE — 97110 THERAPEUTIC EXERCISES: CPT | Performed by: PHYSICAL THERAPIST

## 2022-08-01 PROCEDURE — 97530 THERAPEUTIC ACTIVITIES: CPT | Performed by: PHYSICAL THERAPIST

## 2022-08-01 PROCEDURE — 97112 NEUROMUSCULAR REEDUCATION: CPT | Performed by: PHYSICAL THERAPIST

## 2022-08-01 NOTE — PROGRESS NOTES
Physical Therapy Daily Treatment Note    Patient: Jana Page   : 1950  Diagnosis/ICD-10 Code:  Chronic bilateral low back pain without sciatica [M54.50, G89.29]  Referring practitioner: Sabino Trinidad MD  Date of Initial Visit: Type: THERAPY  Noted: 2022  Today's Date: 2022  Patient seen for 4 sessions           Subjective I have been feeling pretty good today, it does still increase when I am keeping the grandchildren    Objective   See Exercise, Manual, and Modality Logs for complete treatment.     Exercise:  -ab brace (exhalation with TA contraction) 2x10, 5 sec  -LTR 10x 5 sec  -hip add iso w/ball and ab brace 2x10, 5 sec (cues for TA contraction)  -bridge 2x10  -hip abd/ER with blue band, 2x10 B and 1x10 single leg  -supine shoulder horiz abd and D2 flexion, green band x20 each (cues to activate core) (cues for form on D2 flexion)  -piriformis stretching 3x20 sec  -4 inch fwd and lateral step ups with hold for 3-5 sec, x10 B each  -toe taps on 4 inch step, alt LE, x10  -tandem walking along wall, 2x20 feet, light touch to wall for balance      Assessment/Plan  Jana's main pain complaints are with lifting heavier objects, such as her grandchild, and carrying. She does need some cues for core activation and reminded of making sure to activate core when lifting (your ready, set, go).          Timed:    Manual Therapy:         mins  82817;  Therapeutic Exercise:    30     mins  22329;     Neuromuscular Divina:    8    mins  42844;    Therapeutic Activity:     10     mins  73322;     Gait Training:           mins  27700;     Ultrasound:          mins  58044;    Electrical Stimulation:         mins  20793 ( );  Iontophoresis         mins 36090;  Aquatic Therapy         mins 93453;  Dry Needling                   mins 59355/  (Self-pay)    Untimed:  Electrical Stimulation:         mins  34063 ( );  Traction:         mins  29830;     Timed Treatment:   48   mins   Total  Treatment:     48   mins    Janiya Perkins PT, CDNT  Physical Therapist    [place] License:[id number]

## 2022-08-08 ENCOUNTER — TREATMENT (OUTPATIENT)
Dept: PHYSICAL THERAPY | Facility: CLINIC | Age: 72
End: 2022-08-08

## 2022-08-08 DIAGNOSIS — M54.50 CHRONIC BILATERAL LOW BACK PAIN WITHOUT SCIATICA: Primary | ICD-10-CM

## 2022-08-08 DIAGNOSIS — G89.29 CHRONIC BILATERAL LOW BACK PAIN WITHOUT SCIATICA: Primary | ICD-10-CM

## 2022-08-08 DIAGNOSIS — M41.9 SCOLIOSIS OF THORACOLUMBAR SPINE, UNSPECIFIED SCOLIOSIS TYPE: ICD-10-CM

## 2022-08-08 DIAGNOSIS — M54.2 PAIN, NECK: ICD-10-CM

## 2022-08-08 PROCEDURE — 97112 NEUROMUSCULAR REEDUCATION: CPT | Performed by: PHYSICAL THERAPIST

## 2022-08-08 PROCEDURE — 97110 THERAPEUTIC EXERCISES: CPT | Performed by: PHYSICAL THERAPIST

## 2022-08-08 PROCEDURE — 97530 THERAPEUTIC ACTIVITIES: CPT | Performed by: PHYSICAL THERAPIST

## 2022-08-08 NOTE — PROGRESS NOTES
Physical Therapy Daily Treatment Note    Patient: Jana Page   : 1950  Diagnosis/ICD-10 Code:  Chronic bilateral low back pain without sciatica [M54.50, G89.29]  Referring practitioner: Sabino Trinidad MD  Date of Initial Visit: Type: THERAPY  Noted: 2022  Today's Date: 2022  Patient seen for 5 sessions           Subjective I have had some back pain this week. I had the grandkids some this week    Objective     Exercise:  -ab brace (exhalation with TA contraction) 2x10, 5 sec  -LTR 10x 5 sec  -hip add iso w/ball and ab brace 2x10, 5 sec (cues for TA contraction)  -bridge 2x10  -hip abd/ER with blue band, 2x10 B and 1x10 single leg  -supine shoulder horiz abd and D2 flexion, green band x20 each (cues to activate core) (cues for form on D2 flexion)  -piriformis stretching 3x20 sec  -4 inch fwd and lateral step ups with hold for 3-5 sec, x10 B each  -toe taps on 4 inch step, alt LE, x10  -tandem walking along wall, 2x20 feet, light touch to wall for balance  -bent rows 2lb x15 each  DEFER      Assessment/Plan  Jana still having back pain with lifting, but is remembering to use her core. The duration of her pain has been reducing. Continuing to work on core strength, posture, body mechanics.          Timed:    Manual Therapy:         mins  86361;  Therapeutic Exercise:    25     mins  00945;     Neuromuscular Divina:     8   mins  57149;    Therapeutic Activity:     10     mins  05439;     Gait Training:           mins  46550;     Ultrasound:          mins  04739;    Electrical Stimulation:         mins  28800 ( );  Iontophoresis         mins 84717;  Aquatic Therapy         mins 95087;  Dry Needling                   mins 27284/  (Self-pay)    Untimed:  Electrical Stimulation:         mins  96691 ( );  Traction:         mins  09990;     Timed Treatment:   43   mins   Total Treatment:     43   mins    Janiya Perkins PT, CDNT  Physical Therapist    [place] License:[id number]

## 2022-08-15 ENCOUNTER — TREATMENT (OUTPATIENT)
Dept: PHYSICAL THERAPY | Facility: CLINIC | Age: 72
End: 2022-08-15

## 2022-08-15 DIAGNOSIS — M54.50 CHRONIC BILATERAL LOW BACK PAIN WITHOUT SCIATICA: Primary | ICD-10-CM

## 2022-08-15 DIAGNOSIS — M54.2 PAIN, NECK: ICD-10-CM

## 2022-08-15 DIAGNOSIS — M41.9 SCOLIOSIS OF THORACOLUMBAR SPINE, UNSPECIFIED SCOLIOSIS TYPE: ICD-10-CM

## 2022-08-15 DIAGNOSIS — G89.29 CHRONIC BILATERAL LOW BACK PAIN WITHOUT SCIATICA: Primary | ICD-10-CM

## 2022-08-15 PROCEDURE — 97110 THERAPEUTIC EXERCISES: CPT | Performed by: PHYSICAL THERAPIST

## 2022-08-15 PROCEDURE — 97112 NEUROMUSCULAR REEDUCATION: CPT | Performed by: PHYSICAL THERAPIST

## 2022-08-15 PROCEDURE — 97530 THERAPEUTIC ACTIVITIES: CPT | Performed by: PHYSICAL THERAPIST

## 2022-08-15 NOTE — PROGRESS NOTES
Physical Therapy Daily Treatment Note    Patient: Jana Page   : 1950  Diagnosis/ICD-10 Code:  Chronic bilateral low back pain without sciatica [M54.50, G89.29]  Referring practitioner: Sabino Trinidad MD  Date of Initial Visit: Type: THERAPY  Noted: 2022  Today's Date: 8/15/2022  Patient seen for 6 sessions           Subjective my back is a little sore today, played 9 holes of golf then went to dinner.     Objective     Exercise:  -ab brace (exhalation with TA contraction) 2x10, 5 sec  -LTR 10x 5 sec  -hip add iso w/ball and ab brace 2x10, 5 sec (cues for TA contraction)  -bridge 2x10  -hip abd/ER with blue band, 2x10 B and 1x10 single leg  -supine shoulder horiz abd and D2 flexion, green band x20 each (cues to activate core) (cues for form on D2 flexion)  -piriformis stretching 3x20 sec  -4 inch fwd and lateral step ups with hold for 3-5 sec, x10 B each  -toe taps on 4 inch step, alt LE, x10  -tandem walking along wall, 2x20 feet, light touch to wall for balance  -tandem walking with holds for 10 sec, 2x20 feet  -[place] single leg press 130lb x20 each     Assessment/Plan  Jana is doing well overall. She is needing less hand held assist for balance exercises and step ups. Her pain continues with prolonged activity, such as standing, golf, or with heavier lifting of her grandkids. Plan on DC next visit         Timed:    Manual Therapy:         mins  05816;  Therapeutic Exercise:    35     mins  29455;     Neuromuscular Divina:    10    mins  10105;    Therapeutic Activity:     10     mins  58362;     Gait Training:           mins  93892;     Ultrasound:          mins  33573;    Electrical Stimulation:         mins  78450 ( );  Iontophoresis         mins 58439;  Aquatic Therapy         mins 92034;  Dry Needling                   mins /  (Self-pay)    Untimed:  Electrical Stimulation:         mins  21596 ( );  Traction:         mins  97496;     Timed Treatment:   55   mins    Total Treatment:     55   mins    Janiya Perkins PT, CDNT  Physical Therapist    [place] License:[id number]

## 2022-08-22 ENCOUNTER — TREATMENT (OUTPATIENT)
Dept: PHYSICAL THERAPY | Facility: CLINIC | Age: 72
End: 2022-08-22

## 2022-08-22 DIAGNOSIS — M54.2 PAIN, NECK: ICD-10-CM

## 2022-08-22 DIAGNOSIS — M41.9 SCOLIOSIS OF THORACOLUMBAR SPINE, UNSPECIFIED SCOLIOSIS TYPE: ICD-10-CM

## 2022-08-22 DIAGNOSIS — M54.50 CHRONIC BILATERAL LOW BACK PAIN WITHOUT SCIATICA: Primary | ICD-10-CM

## 2022-08-22 DIAGNOSIS — G89.29 CHRONIC BILATERAL LOW BACK PAIN WITHOUT SCIATICA: Primary | ICD-10-CM

## 2022-08-22 PROCEDURE — 97110 THERAPEUTIC EXERCISES: CPT | Performed by: PHYSICAL THERAPIST

## 2022-08-22 PROCEDURE — 97530 THERAPEUTIC ACTIVITIES: CPT | Performed by: PHYSICAL THERAPIST

## 2022-08-22 NOTE — PROGRESS NOTES
30-Day / 10-Visit Progress Note/Discharge summary         Patient: Jana Page   : 1950  Diagnosis/ICD-10 Code:  Chronic bilateral low back pain without sciatica [M54.50, G89.29]  Referring practitioner: Sabino Trinidad MD  Date of Initial Visit: Type: THERAPY  Noted: 2022  Today's Date: 2022  Patient seen for 7 sessions      Subjective:     Clinical Progress: improved  Home Program Compliance: Yes  Treatment has included:  therapeutic exercise, manual therapy, therapeutic activity, neuro-muscular retraining  and patient education with home exercise program     Subjective I am not having much pain overall. It does hurt some after playing golf, lifting. Pain at most, -8/10, but not everyday  Objective          Strength/Myotome Testing     Left Hip   Planes of Motion   Flexion: 4+  Abduction: 4+  Adduction: 5  External rotation: 4+    Right Hip   Planes of Motion   Flexion: 4+  Abduction: 4+  Adduction: 5  External rotation: 4+    Left Knee   Flexion: 4+  Extension: 4+    Right Knee   Flexion: 4+  Extension: 4+    Additional Strength Details  Core strength, 4 to 4+/5          Exercise:  -ab brace (exhalation with TA contraction) 2x10, 5 sec  -LTR 10x 5 sec  -hip add iso w/ball and ab brace 2x10, 5 sec (cues for TA contraction)  -bridge 2x10  -hip abd/ER with blue band, 2x10 B and 1x10 single leg  -supine shoulder horiz abd and D2 flexion, green band x20 each (cues to activate core) (cues for form on D2 flexion)  -piriformis stretching 3x20 sec  -4 inch fwd and lateral step ups with hold for 3-5 sec, x10 B each  -toe taps on 4 inch step, alt LE, x10  -tandem walking along wall, 2x20 feet, light touch to wall for balance  -tandem walking with holds for 10 sec, 2x20 feet      Functional Outcome Score:   Oswestry Back Index=12%  Neck Disability Index=28%    Assessment & Plan     Assessment    Assessment details:  Jana Page was seen for 7 physical therapy sessions for back pain.   Treatment included therapeutic exercise, manual therapy, therapeutic activity, neuro-muscular retraining  and patient education with home exercise program . Progress to physical therapy goals was good. Jana can still have pain up to 6-8/10, but only with more strenuous activity such as caring for grandkids or sometimes after golfing.  She was discharged to an independent HEP and provided patient education to self-manage condition.       Goals  Plan Goals: ST wks  1. Patient will be independent with education for symptom management, joint protection and strategies to minimize stress on affected tissues (MET)   2. Pt to improve pain complaints to no more than 4/10 with ADLs (PART MET)     LT wks  1. Pt to improve pain complaints to no more than 2/10 with ADLs (NOT MET)    2. Pt to improve trunk, UE, and LE strength by 1/2 to 1 MMT grade (MET)    3. Pt to improve Oswestry Back index score from 30% to 20% for overall functional improvement (MET)  improved to 12%  4. Pt to improve score on Neck Disability Index from 26% to 16% for reduction of pain and headaches (MET) improved to 14% (this month 28%)  5. Pt to be independent with HEP for ROM, flexibility and core strength (MET)      Plan  Treatment plan discussed with: patient           Recommendations: Discharge  Timeframe: 1 week  Prognosis to achieve goals: good    PT Signature: Janiya Perkins PT, CDNT    License Number: [id number]    Electronically signed by Janiya Perkins PT, 22, 9:10 AM EDT      Based upon review of the patient's progress and continued therapy plan, it is my medical opinion that Jana Page should continue physical therapy treatment at [facility]  [address] [place] [address] [address] [place] [address]  [phone].    Signature: __________________________________  Sabino Trinidad MD    Timed:  Manual Therapy:         mins  61571;  Therapeutic Exercise:    30     mins  50578;      Neuromuscular Divina:        mins  68706;    Therapeutic Activity:     12     mins  07792;     Gait Training:           mins  94765;     Ultrasound:          mins  64697;    Iontophoresis         mins 23851;  Dry Needling                   mins 96707/20561 (Self-pay)    Untimed:  Electrical Stimulation:         mins  16894 ( );  Traction:         mins  08218;     Timed Treatment:   42   mins   Total Treatment:     42   mins

## 2022-09-07 ENCOUNTER — TELEPHONE (OUTPATIENT)
Dept: INTERNAL MEDICINE | Facility: CLINIC | Age: 72
End: 2022-09-07

## 2022-09-07 NOTE — TELEPHONE ENCOUNTER
Caller: Jana Page    Relationship: Self    Best call back number: [phone]    What was the call regarding: PATIENT WOULD LIKE TO KNOW IF THE OFFICE WILL BE GETTING THE COVID BOOSTERS AND WOULD LIKE TO GET SCHEDULED AN APPOINTMENT TO GET ONE. ATTEMPTED WARM TRANSFER, UNSUCCESSFUL. PLEASE ADVISE.    Do you require a callback: YES

## 2022-09-07 NOTE — TELEPHONE ENCOUNTER
Caller: Kaylee Jana S    Relationship: Self    Best call back number: [phone]    What was the call regarding: PATIENT STATED THAT SHE GETS LIGHTHEADEDNESS AND SWEATY AT TIMES AND IT COMES AND GOES. PATIENT STATED IT HAS BEEN GOING ON OFF AND ON FOR TWO MONTHS, PARTICULARLY THIS SUMMER. PATIENT STATED THAT IT IS NOT AT THE SAME TIME EVERY TIME IT HAPPENS, BUT IT DOES OFTEN HAPPEN AFTER LUNCH. PATIENT IS WONDERING IF IT IS RELATED TO WHAT SHE IS EATING. PLEASE ADVISE.     Do you require a callback: YES

## 2022-09-12 ENCOUNTER — OFFICE VISIT (OUTPATIENT)
Dept: INTERNAL MEDICINE | Facility: CLINIC | Age: 72
End: 2022-09-12

## 2022-09-12 VITALS
HEIGHT: 66 IN | TEMPERATURE: 98.2 F | SYSTOLIC BLOOD PRESSURE: 120 MMHG | WEIGHT: 169 LBS | OXYGEN SATURATION: 98 % | HEART RATE: 72 BPM | BODY MASS INDEX: 27.16 KG/M2 | DIASTOLIC BLOOD PRESSURE: 80 MMHG

## 2022-09-12 DIAGNOSIS — R73.03 PREDIABETES: Primary | Chronic | ICD-10-CM

## 2022-09-12 DIAGNOSIS — R23.2 HOT FLASH NOT DUE TO MENOPAUSE: ICD-10-CM

## 2022-09-12 LAB
ALBUMIN SERPL-MCNC: 5.1 G/DL (ref 3.5–5.2)
ALBUMIN/GLOB SERPL: 2.7 G/DL
ALP SERPL-CCNC: 87 U/L (ref 39–117)
ALT SERPL-CCNC: 25 U/L (ref 1–33)
AST SERPL-CCNC: 24 U/L (ref 1–32)
BASOPHILS # BLD AUTO: 0.05 10*3/MM3 (ref 0–0.2)
BASOPHILS NFR BLD AUTO: 0.7 % (ref 0–1.5)
BILIRUB SERPL-MCNC: 0.5 MG/DL (ref 0–1.2)
BUN SERPL-MCNC: 10 MG/DL (ref 8–23)
BUN/CREAT SERPL: 14.5 (ref 7–25)
CALCIUM SERPL-MCNC: 9.8 MG/DL (ref 8.6–10.5)
CHLORIDE SERPL-SCNC: 104 MMOL/L (ref 98–107)
CO2 SERPL-SCNC: 29.5 MMOL/L (ref 22–29)
CREAT SERPL-MCNC: 0.69 MG/DL (ref 0.57–1)
EGFRCR-CYS SERPLBLD CKD-EPI 2021: 92.3 ML/MIN/1.73
EOSINOPHIL # BLD AUTO: 0.19 10*3/MM3 (ref 0–0.4)
EOSINOPHIL NFR BLD AUTO: 2.7 % (ref 0.3–6.2)
ERYTHROCYTE [DISTWIDTH] IN BLOOD BY AUTOMATED COUNT: 12.5 % (ref 12.3–15.4)
GLOBULIN SER CALC-MCNC: 1.9 GM/DL
GLUCOSE SERPL-MCNC: 128 MG/DL (ref 65–99)
HBA1C MFR BLD: 5.7 % (ref 4.8–5.6)
HCT VFR BLD AUTO: 44.4 % (ref 34–46.6)
HGB BLD-MCNC: 14.8 G/DL (ref 12–15.9)
IMM GRANULOCYTES # BLD AUTO: 0.01 10*3/MM3 (ref 0–0.05)
IMM GRANULOCYTES NFR BLD AUTO: 0.1 % (ref 0–0.5)
LYMPHOCYTES # BLD AUTO: 1.87 10*3/MM3 (ref 0.7–3.1)
LYMPHOCYTES NFR BLD AUTO: 26.4 % (ref 19.6–45.3)
MCH RBC QN AUTO: 30.1 PG (ref 26.6–33)
MCHC RBC AUTO-ENTMCNC: 33.3 G/DL (ref 31.5–35.7)
MCV RBC AUTO: 90.4 FL (ref 79–97)
MONOCYTES # BLD AUTO: 0.69 10*3/MM3 (ref 0.1–0.9)
MONOCYTES NFR BLD AUTO: 9.7 % (ref 5–12)
NEUTROPHILS # BLD AUTO: 4.28 10*3/MM3 (ref 1.7–7)
NEUTROPHILS NFR BLD AUTO: 60.4 % (ref 42.7–76)
NRBC BLD AUTO-RTO: 0 /100 WBC (ref 0–0.2)
PLATELET # BLD AUTO: 310 10*3/MM3 (ref 140–450)
POTASSIUM SERPL-SCNC: 4 MMOL/L (ref 3.5–5.2)
PROT SERPL-MCNC: 7 G/DL (ref 6–8.5)
RBC # BLD AUTO: 4.91 10*6/MM3 (ref 3.77–5.28)
SODIUM SERPL-SCNC: 143 MMOL/L (ref 136–145)
TSH SERPL DL<=0.005 MIU/L-ACNC: 1.44 UIU/ML (ref 0.27–4.2)
WBC # BLD AUTO: 7.09 10*3/MM3 (ref 3.4–10.8)

## 2022-09-12 PROCEDURE — 99214 OFFICE O/P EST MOD 30 MIN: CPT | Performed by: NURSE PRACTITIONER

## 2022-09-12 NOTE — ASSESSMENT & PLAN NOTE
Discussed importance of eating well balanced diet at regular intervals throughout day to help stabilize blood glucose.   From symptom report, it appears patient may be experiencing hypoglycemic/hyperglycemic episodes.

## 2022-09-12 NOTE — PROGRESS NOTES
"Chief Complaint  Dizziness (Light headed, hot flashes) and Headache (Occasional headaches)    Subjective        Jana Page presents to [facility]  History of Present Illness  This is a 71 y/o female presenting to office for f/u with hot flushes, dizziness, and headaches. Patient reports these are intermittent. Patient reports she does seem to notice this after lunch. Patient reports this has been ongoing for a few months. Patient reports she has been struggling with following low glycemic diet. Patient reports she does eat lunch and dinner. Patient reports often skipping breakfast. Patient reports these episodes only last 10-15 minutes and then her symptoms resolve. Denies any blood in stool or hematuria.      Objective   Vital Signs:  /80 (BP Location: Left arm, Patient Position: Sitting, Cuff Size: Adult)   Pulse 72   Temp 98.2 °F (36.8 °C)   Ht 167.6 cm (66\")   Wt 76.7 kg (169 lb)   SpO2 98%   BMI 27.28 kg/m²   Estimated body mass index is 27.28 kg/m² as calculated from the following:    Height as of this encounter: 167.6 cm (66\").    Weight as of this encounter: 76.7 kg (169 lb).          Physical Exam  Constitutional:       Appearance: Normal appearance.   HENT:      Head: Normocephalic and atraumatic.   Eyes:      Pupils: Pupils are equal, round, and reactive to light.   Cardiovascular:      Rate and Rhythm: Normal rate and regular rhythm.      Pulses: Normal pulses.      Heart sounds: Normal heart sounds. No murmur heard.    No friction rub. No gallop.   Pulmonary:      Effort: Pulmonary effort is normal. No respiratory distress.      Breath sounds: Normal breath sounds. No stridor. No wheezing, rhonchi or rales.   Abdominal:      General: Bowel sounds are normal. There is no distension.      Palpations: Abdomen is soft.      Tenderness: There is no abdominal tenderness.   Musculoskeletal:         General: Normal range of motion.   Skin:     General: Skin is " warm and dry.   Neurological:      General: No focal deficit present.      Mental Status: She is alert and oriented to person, place, and time. Mental status is at baseline.   Psychiatric:         Mood and Affect: Mood normal.         Thought Content: Thought content normal.         Judgment: Judgment normal.        Result Review :  The following data was reviewed by: RACHELE Walker on 09/12/2022:  Common labs    Common Labsle 12/14/21 2/10/22 2/10/22 3/24/22     1530 1530    Glucose   74    BUN   14    Creatinine 0.90  1.16 (A)    eGFR Non  Am   46 (A)    Sodium   142    Potassium   4.2    Chloride   104    Calcium   10.0    Albumin   4.70    Total Bilirubin   0.3    Alkaline Phosphatase   81    AST (SGOT)   24    ALT (SGPT)   27    WBC  9.40     Hemoglobin  14.5     Hematocrit  43.3     Platelets  331     Hemoglobin A1C    5.8 (A)   (A) Abnormal value       Comments are available for some flowsheets but are not being displayed.                    Assessment and Plan   Diagnoses and all orders for this visit:    1. Prediabetes (Primary)  Assessment & Plan:  Discussed importance of eating well balanced diet at regular intervals throughout day to help stabilize blood glucose.   From symptom report, it appears patient may be experiencing hypoglycemic/hyperglycemic episodes.     Orders:  -     Hemoglobin A1c  -     TSH Rfx On Abnormal To Free T4    2. Hot flash not due to menopause  Assessment & Plan:  Labs today.   Advised to start eating well balanced diet with three meals daily.       Orders:  -     CBC & Differential  -     Comprehensive metabolic panel         I spent 30 minutes caring for Jana on this date of service. This time includes time spent by me in the following activities:preparing for the visit, reviewing tests, obtaining and/or reviewing a separately obtained history, performing a medically appropriate examination and/or evaluation , counseling and educating the patient/family/caregiver,  ordering medications, tests, or procedures, documenting information in the medical record and care coordination  Follow Up   Return if symptoms worsen or fail to improve.  Patient was given instructions and counseling regarding her condition or for health maintenance advice. Please see specific information pulled into the AVS if appropriate.

## 2022-09-13 ENCOUNTER — TELEPHONE (OUTPATIENT)
Dept: INTERNAL MEDICINE | Facility: CLINIC | Age: 72
End: 2022-09-13

## 2022-09-13 NOTE — TELEPHONE ENCOUNTER
Caller: Jana Page    Relationship to patient: Self    Best call back number: [phone]    Patient is needing: PATIENT RETURNED MISSED CALL  PLEASE ADVISE

## 2022-09-13 NOTE — PROGRESS NOTES
Please let patient know--  A1C still elevated indicating prediabetes. Recommend regular interval meals throughout day.   All other labs stable

## 2022-09-14 NOTE — TELEPHONE ENCOUNTER
Hub staff attempted to follow warm transfer process and was unsuccessful     Caller: Jana Page    Relationship to patient: Self    Best call back number: [phone]    Patient is needing: THE PATIENT ONCE AGAIN RETURNED A CALL TO THE OFFICE REGARDING LAB RESULTS. THE PATIENT STATES THAT A MESSAGE CAN BE LEFT AT THIS NUMBER IF NEEDED. PLEASE ADVISE.

## 2022-09-21 ENCOUNTER — TELEPHONE (OUTPATIENT)
Dept: INTERNAL MEDICINE | Facility: CLINIC | Age: 72
End: 2022-09-21

## 2022-09-23 ENCOUNTER — OFFICE VISIT (OUTPATIENT)
Dept: INTERNAL MEDICINE | Facility: CLINIC | Age: 72
End: 2022-09-23

## 2022-09-23 VITALS
TEMPERATURE: 97 F | SYSTOLIC BLOOD PRESSURE: 132 MMHG | DIASTOLIC BLOOD PRESSURE: 82 MMHG | WEIGHT: 169 LBS | HEIGHT: 66 IN | BODY MASS INDEX: 27.16 KG/M2 | OXYGEN SATURATION: 98 % | HEART RATE: 67 BPM

## 2022-09-23 DIAGNOSIS — I35.1 MILD AORTIC VALVE REGURGITATION: ICD-10-CM

## 2022-09-23 DIAGNOSIS — H65.193 ACUTE EFFUSION OF BOTH MIDDLE EARS: ICD-10-CM

## 2022-09-23 DIAGNOSIS — Z13.1 ENCOUNTER FOR SCREENING FOR DIABETES MELLITUS: ICD-10-CM

## 2022-09-23 DIAGNOSIS — R73.03 PREDIABETES: Primary | Chronic | ICD-10-CM

## 2022-09-23 DIAGNOSIS — R51.9 PERSISTENT HEADACHES: ICD-10-CM

## 2022-09-23 DIAGNOSIS — R42 DIZZINESS: ICD-10-CM

## 2022-09-23 PROCEDURE — 99214 OFFICE O/P EST MOD 30 MIN: CPT | Performed by: NURSE PRACTITIONER

## 2022-09-23 NOTE — PROGRESS NOTES
"Chief Complaint  Headache (Pt is also having hot flashes )    Subjective        Jana Page presents to [facility]  History of Present Illness  This is a 73 y/o female presenting to office for complaints of headaches and continued hot flushes. Patient reports that she has continued to have these headaches that are intermittent and usually occur 2x a week. Patient reports she also experiences some dizziness when these headaches occur. Patient reports these headaches usually occur in the afternoon. Patient reports she does not check BP when this occurs. Patient reports she has been eating more throughout the day and trying to decrease sugar intake. Patient had CT head back in 2017 which did show some bilateral basal ganglia calcifications along with some mild small vessel white matter ischemic disease. Patient has had previous GTT test-- which did indicate prediabetes with glucose at 156 at the 2 hour joss. Patient reports she has been trying to eat more protein with meals, specifically breakfast.     Objective   Vital Signs:  /82 (BP Location: Left arm, Patient Position: Sitting, Cuff Size: Adult)   Pulse 67   Temp 97 °F (36.1 °C) (Infrared)   Ht 167.6 cm (66\")   Wt 76.7 kg (169 lb)   SpO2 98%   BMI 27.28 kg/m²   Estimated body mass index is 27.28 kg/m² as calculated from the following:    Height as of this encounter: 167.6 cm (66\").    Weight as of this encounter: 76.7 kg (169 lb).          Physical Exam  Constitutional:       Appearance: Normal appearance.   HENT:      Head: Normocephalic and atraumatic.      Right Ear: Ear canal and external ear normal. A middle ear effusion is present.      Left Ear: Ear canal and external ear normal. A middle ear effusion is present.      Nose: Nose normal.      Mouth/Throat:      Mouth: Mucous membranes are moist.   Neck:      Vascular: No carotid bruit.   Cardiovascular:      Rate and Rhythm: Normal rate and regular rhythm.      " Pulses: Normal pulses.      Heart sounds: Murmur heard.    Systolic murmur is present with a grade of 1/6.    No friction rub. No gallop.   Pulmonary:      Effort: Pulmonary effort is normal. No respiratory distress.      Breath sounds: Normal breath sounds. No stridor. No wheezing, rhonchi or rales.   Musculoskeletal:      Cervical back: Normal range of motion and neck supple.   Skin:     General: Skin is warm and dry.   Neurological:      General: No focal deficit present.      Mental Status: She is alert and oriented to person, place, and time. Mental status is at baseline.   Psychiatric:         Mood and Affect: Mood normal.         Thought Content: Thought content normal.         Judgment: Judgment normal.        Result Review :  The following data was reviewed by: RACHELE Walker on 09/23/2022:  Common labs    Common Labs 2/10/22 2/10/22 3/24/22 9/12/22 9/12/22 9/12/22    1530 1530  0927 0927 0927   Glucose  74   128 (A)    BUN  14   10    Creatinine  1.16 (A)   0.69    eGFR Non  Am  46 (A)       Sodium  142   143    Potassium  4.2   4.0    Chloride  104   104    Calcium  10.0   9.8    Total Protein     7.0    Albumin  4.70   5.10    Total Bilirubin  0.3   0.5    Alkaline Phosphatase  81   87    AST (SGOT)  24   24    ALT (SGPT)  27   25    WBC 9.40   7.09     Hemoglobin 14.5   14.8     Hematocrit 43.3   44.4     Platelets 331   310     Hemoglobin A1C   5.8 (A)   5.70 (A)   (A) Abnormal value       Comments are available for some flowsheets but are not being displayed.             Reviewed:  CT Head Without Contrast (03/15/2017 5:03 PM)       Assessment and Plan   Diagnoses and all orders for this visit:    1. Prediabetes (Primary)  Assessment & Plan:  GGT screening as ordered.   Recommended regular food intake with healthy diet choices including proteins, fats, and lower carbohydrate options.     Orders:  -     Cancel: Glucose 2 Hour PP; Future  -     GLUCOSE TOLERANCE (3 SP BLOOD); Future    2.  Dizziness  -     MRI Brain With & Without Contrast; Future    3. Persistent headaches  Assessment & Plan:  MRI brain ordered.       4. Encounter for screening for diabetes mellitus   -     GLUCOSE TOLERANCE (3 SP BLOOD); Future    5. Mild aortic valve regurgitation  Assessment & Plan:  Recommended to f/u with Dr. Mcmahon as scheduled.   Advised if patient ever experiences syncopal episode or chest pain to go to ER.       6. Acute effusion of both middle ears  Assessment & Plan:  Recommended to continue with flonase.   May benefit from antihistamine.         Due to patient's common occurrence of these episodes occurring at same time of day-- I do wonder if this is possibly related to glucose intolerance with either high or low blood sugar events. Patient has seen cardiology previously but it wasn't thought that the dizziness was cardiogenic related. I did recommend patient f/u with Dr. Mcmahon as scheduled. We will proceed with glucose tolerance testing. I offered a dietician referral but patient does not want to pursue that at this time. Patient recommended to check BP during these events in case it is due to higher blood pressures.      I spent 30 minutes caring for Jana on this date of service. This time includes time spent by me in the following activities:preparing for the visit, reviewing tests, obtaining and/or reviewing a separately obtained history, performing a medically appropriate examination and/or evaluation , counseling and educating the patient/family/caregiver, ordering medications, tests, or procedures, documenting information in the medical record and care coordination  Follow Up   Return for Next scheduled follow up 10/17/22.  Patient was given instructions and counseling regarding her condition or for health maintenance advice. Please see specific information pulled into the AVS if appropriate.

## 2022-09-23 NOTE — ASSESSMENT & PLAN NOTE
Recommended to f/u with Dr. Mcmahon as scheduled.   Advised if patient ever experiences syncopal episode or chest pain to go to ER.

## 2022-09-23 NOTE — ASSESSMENT & PLAN NOTE
GGT screening as ordered.   Recommended regular food intake with healthy diet choices including proteins, fats, and lower carbohydrate options.

## 2022-10-13 ENCOUNTER — CLINICAL SUPPORT (OUTPATIENT)
Dept: INTERNAL MEDICINE | Facility: CLINIC | Age: 72
End: 2022-10-13

## 2022-10-13 DIAGNOSIS — Z23 NEED FOR INFLUENZA VACCINATION: Primary | ICD-10-CM

## 2022-10-13 PROCEDURE — 90662 IIV NO PRSV INCREASED AG IM: CPT | Performed by: FAMILY MEDICINE

## 2022-10-13 PROCEDURE — G0008 ADMIN INFLUENZA VIRUS VAC: HCPCS | Performed by: FAMILY MEDICINE

## 2022-10-17 ENCOUNTER — OFFICE VISIT (OUTPATIENT)
Dept: INTERNAL MEDICINE | Facility: CLINIC | Age: 72
End: 2022-10-17

## 2022-10-17 VITALS
TEMPERATURE: 97.7 F | BODY MASS INDEX: 27.48 KG/M2 | WEIGHT: 171 LBS | HEIGHT: 66 IN | SYSTOLIC BLOOD PRESSURE: 138 MMHG | OXYGEN SATURATION: 99 % | RESPIRATION RATE: 18 BRPM | HEART RATE: 64 BPM | DIASTOLIC BLOOD PRESSURE: 90 MMHG

## 2022-10-17 DIAGNOSIS — E78.00 HYPERCHOLESTEROLEMIA: Chronic | ICD-10-CM

## 2022-10-17 DIAGNOSIS — Z00.00 MEDICARE ANNUAL WELLNESS VISIT, SUBSEQUENT: Primary | ICD-10-CM

## 2022-10-17 DIAGNOSIS — F40.240 CLAUSTROPHOBIA: ICD-10-CM

## 2022-10-17 PROBLEM — E88.81 METABOLIC SYNDROME: Status: RESOLVED | Noted: 2018-08-17 | Resolved: 2022-10-17

## 2022-10-17 PROBLEM — E88.810 METABOLIC SYNDROME: Status: RESOLVED | Noted: 2018-08-17 | Resolved: 2022-10-17

## 2022-10-17 PROCEDURE — 1159F MED LIST DOCD IN RCRD: CPT | Performed by: FAMILY MEDICINE

## 2022-10-17 PROCEDURE — 1126F AMNT PAIN NOTED NONE PRSNT: CPT | Performed by: FAMILY MEDICINE

## 2022-10-17 PROCEDURE — G0439 PPPS, SUBSEQ VISIT: HCPCS | Performed by: FAMILY MEDICINE

## 2022-10-17 PROCEDURE — 1170F FXNL STATUS ASSESSED: CPT | Performed by: FAMILY MEDICINE

## 2022-10-17 RX ORDER — DIAZEPAM 5 MG/1
TABLET ORAL
Qty: 2 TABLET | Refills: 0 | Status: SHIPPED | OUTPATIENT
Start: 2022-10-17

## 2022-10-17 NOTE — PROGRESS NOTES
The ABCs of the Annual Wellness Visit  Subsequent Medicare Wellness Visit    Chief Complaint   Patient presents with   • Medicare Wellness-subsequent      Subjective    History of Present Illness:  Jana Page is a 72 y.o. female who presents for a Subsequent Medicare Wellness Visit.    The following portions of the patient's history were reviewed and   updated as appropriate: allergies, current medications, past family history, past medical history, past social history, past surgical history and problem list.    Compared to one year ago, the patient feels her physical   health is the same.    Compared to one year ago, the patient feels her mental   health is the same.    Recent Hospitalizations:  She was not admitted to the hospital during the last year.       Current Medical Providers:  Patient Care Team:  Sabino Trinidad MD as PCP - General (Family Medicine)    Outpatient Medications Prior to Visit   Medication Sig Dispense Refill   • Accu-Chek FastClix Lancets misc Use to check blood sugar once daily 100 each 2   • amLODIPine (NORVASC) 5 MG tablet TAKE ONE TABLET BY MOUTH DAILY 90 tablet 3   • atorvastatin (LIPITOR) 20 MG tablet TAKE ONE TABLET BY MOUTH DAILY 90 tablet 3   • Cholecalciferol (VITAMIN D) 1000 UNITS tablet Take  by mouth.     • Coenzyme Q10 (CO Q 10) 10 MG capsule Take  by mouth Daily.     • DULoxetine (CYMBALTA) 60 MG capsule TAKE ONE CAPSULE BY MOUTH DAILY 90 capsule 3   • fluticasone (FLONASE) 50 MCG/ACT nasal spray 2 sprays into the nostril(s) as directed by provider Daily. Administer 2 sprays in each nostril for each dose.     • Multiple Vitamin (MULTIVITAMIN) capsule Take 1 capsule by mouth Daily.     • olmesartan (BENICAR) 40 MG tablet TAKE ONE TABLET BY MOUTH DAILY 90 tablet 3   • Probiotic Product (PRO-BIOTIC BLEND) capsule Take  by mouth Daily.     • propranolol (INDERAL) 60 MG tablet TAKE ONE TABLET BY MOUTH TWICE A  tablet 3     No facility-administered medications prior  "to visit.       No opioid medication identified on active medication list. I have reviewed chart for other potential  high risk medication/s and harmful drug interactions in the elderly.          Aspirin is on active medication list. Aspirin use is indicated based on review of current medical condition/s. Pros and cons of this therapy have been discussed today. Benefits of this medication outweigh potential harm.  Patient has been encouraged to continue taking this medication.  .      Patient Active Problem List   Diagnosis   • Disc disorder of cervical region   • Degeneration of intervertebral disc of lumbar region   • Steatosis of liver   • Fibrocystic breast changes   • Hypercholesterolemia   • Osteoarthritis of knee   • Osteopenia   • Prediabetes   • Scoliosis   • Lipoma of left lower extremity   • Health care maintenance   • Aortic atherosclerosis (HCC)   • Family history of premature coronary artery disease   • Anxiety   • Arthralgia of metacarpophalangeal joint, right   • Dizziness   • First degree AV block   • Essential hypertension   • Lower abdominal pain   • Fibroids   • Other idiopathic scoliosis, lumbar region   • Hot flash not due to menopause   • Persistent headaches   • Mild aortic valve regurgitation   • Acute effusion of both middle ears     Advance Care Planning  Advance Directive is not on file.  ACP discussion was held with the patient during this visit. Patient has an advance directive (not in EMR), copy requested.       Objective    Vitals:    10/17/22 1145   BP: 138/90   BP Location: Left arm   Patient Position: Sitting   Cuff Size: Small Adult   Pulse: 64   Resp: 18   Temp: 97.7 °F (36.5 °C)   SpO2: 99%   Weight: 77.6 kg (171 lb)   Height: 167.6 cm (66\")   PainSc: 0-No pain     Body mass index is 27.6 kg/m².  BMI has not been calculated during today's encounter.     Does the patient have evidence of cognitive impairment? No    Physical Exam  Vitals and nursing note reviewed. "   Constitutional:       General: She is not in acute distress.     Appearance: Normal appearance.   Cardiovascular:      Rate and Rhythm: Normal rate and regular rhythm.      Heart sounds: Normal heart sounds. No murmur heard.  Pulmonary:      Effort: Pulmonary effort is normal.      Breath sounds: Normal breath sounds.   Neurological:      Mental Status: She is alert.       Lab Results   Component Value Date    HGBA1C 5.70 (H) 2022            HEALTH RISK ASSESSMENT    Smoking Status:  Social History     Tobacco Use   Smoking Status Former   • Packs/day: 1.00   • Years: 10.00   • Pack years: 10.00   • Types: Cigarettes   • Start date:    • Quit date:    • Years since quittin.8   Smokeless Tobacco Never     Alcohol Consumption:  Social History     Substance and Sexual Activity   Alcohol Use Yes    Comment: social     Fall Risk Screen:    SALTY Fall Risk Assessment was completed, and patient is at LOW risk for falls.Assessment completed on:10/17/2022    Depression Screening:  PHQ-2/PHQ-9 Depression Screening 10/17/2022   Retired Total Score -   Little Interest or Pleasure in Doing Things 0-->not at all   Feeling Down, Depressed or Hopeless 0-->not at all   PHQ-9: Brief Depression Severity Measure Score 0       Health Habits and Functional and Cognitive Screening:  Functional & Cognitive Status 10/17/2022   Do you have difficulty preparing food and eating? No   Do you have difficulty bathing yourself, getting dressed or grooming yourself? No   Do you have difficulty using the toilet? No   Do you have difficulty moving around from place to place? No   Do you have trouble with steps or getting out of a bed or a chair? No   Current Diet Well Balanced Diet   Dental Exam Up to date   Eye Exam Up to date   Exercise (times per week) 0 times per week        Exercise Frequency Comment -   Current Exercises Include No Regular Exercise        Exercise Comment -   Current Exercise Activities Include -   Do you  need help using the phone?  No   Are you deaf or do you have serious difficulty hearing?  No   Do you need help with transportation? No   Do you need help shopping? No   Do you need help preparing meals?  No   Do you need help with housework?  No   Do you need help with laundry? No   Do you need help taking your medications? No   Do you need help managing money? No   Do you ever drive or ride in a car without wearing a seat belt? No   Have you felt unusual stress, anger or loneliness in the last month? -   Who do you live with? -   If you need help, do you have trouble finding someone available to you? -   Have you been bothered in the last four weeks by sexual problems? -   Do you have difficulty concentrating, remembering or making decisions? -       Age-appropriate Screening Schedule:  Refer to the list below for future screening recommendations based on patient's age, sex and/or medical conditions. Orders for these recommended tests are listed in the plan section. The patient has been provided with a written plan.    Health Maintenance   Topic Date Due   • LIPID PANEL  09/17/2022   • MAMMOGRAM  12/27/2022   • DXA SCAN  10/14/2023   • TDAP/TD VACCINES (4 - Td or Tdap) 07/08/2029   • INFLUENZA VACCINE  Completed   • ZOSTER VACCINE  Addressed   • PAP SMEAR  Discontinued              Common labs    Common Labs 2/10/22 2/10/22 3/24/22 9/12/22 9/12/22 9/12/22    1530 1530  0927 0927 0927   Glucose  74   128 (A)    BUN  14   10    Creatinine  1.16 (A)   0.69    eGFR Non  Am  46 (A)       Sodium  142   143    Potassium  4.2   4.0    Chloride  104   104    Calcium  10.0   9.8    Total Protein     7.0    Albumin  4.70   5.10    Total Bilirubin  0.3   0.5    Alkaline Phosphatase  81   87    AST (SGOT)  24   24    ALT (SGPT)  27   25    WBC 9.40   7.09     Hemoglobin 14.5   14.8     Hematocrit 43.3   44.4     Platelets 331   310     Hemoglobin A1C   5.8 (A)   5.70 (A)   (A) Abnormal value       Comments are available  for some flowsheets but are not being displayed.               Assessment & Plan   CMS Preventative Services Quick Reference  Risk Factors Identified During Encounter  Immunizations Discussed/Encouraged (specific Immunizations; COVID19  The above risks/problems have been discussed with the patient.  Follow up actions/plans if indicated are seen below in the Assessment/Plan Section.  Pertinent information has been shared with the patient in the After Visit Summary.    Diagnoses and all orders for this visit:    1. Medicare annual wellness visit, subsequent (Primary)    2. Hypercholesterolemia  -     Lipid Panel With LDL / HDL Ratio; Future    3. Claustrophobia  -     diazePAM (VALIUM) 5 MG tablet; Take 2 tablets 45 minutes prior to MRI study.  Must have  to take home after study.  Dispense: 2 tablet; Refill: 0       Patient has been erroneously marked as diabetic. Based on the available clinical information, she does not have diabetes and should therefore be excluded from diabetic health maintenance and quality measures for the remainder of the reporting period.        Follow Up:   Return in about 6 months (around 4/17/2023) for Next scheduled follow up.     An After Visit Summary and PPPS were made available to the patient.

## 2022-10-21 ENCOUNTER — HOSPITAL ENCOUNTER (OUTPATIENT)
Dept: MRI IMAGING | Facility: HOSPITAL | Age: 72
Discharge: HOME | End: 2022-10-21
Admitting: NURSE PRACTITIONER

## 2022-10-21 DIAGNOSIS — R42 DIZZINESS: ICD-10-CM

## 2022-10-21 PROCEDURE — 0 GADOBENATE DIMEGLUMINE 529 MG/ML SOLUTION: Performed by: NURSE PRACTITIONER

## 2022-10-21 PROCEDURE — 70553 MRI BRAIN STEM W/O & W/DYE: CPT

## 2022-10-21 PROCEDURE — A9577 INJ MULTIHANCE: HCPCS | Performed by: NURSE PRACTITIONER

## 2022-10-21 RX ADMIN — GADOBENATE DIMEGLUMINE 15 ML: 529 INJECTION, SOLUTION INTRAVENOUS at 11:24

## 2022-10-21 NOTE — PROGRESS NOTES
Please let patient know--  MRI of brain did not show any chronic stroke or abnormalities. It did show some chronic small vessel ischemic phenomena which is most likely caused from HTN. I do not see any explanation of dizziness from the MRI imaging.

## 2022-10-24 ENCOUNTER — TELEPHONE (OUTPATIENT)
Dept: INTERNAL MEDICINE | Facility: CLINIC | Age: 72
End: 2022-10-24

## 2022-10-24 NOTE — TELEPHONE ENCOUNTER
Caller: Jana Page    Relationship: Self    Best call back number: [phone]     Caller requesting test results:MRI TEST RESULTS    What test was performed: MRI     When was the test performed: 10/21/2022    Where was the test performed: [facility]    Additional notes:       PATIENT DOES NOT USE MYCHART.    PATIENT WOULD LIKE TO KNOW IF YOU HAVE GOTTEN THE MRI RESULTS YET?  SHE STATED THAT YOU COULD LEAVE A MESSAGE ON HER VOICE MAIL IF YOU ARE NOT ABLE TO REACH HER.

## 2022-10-25 ENCOUNTER — TELEPHONE (OUTPATIENT)
Dept: INTERNAL MEDICINE | Facility: CLINIC | Age: 72
End: 2022-10-25

## 2022-10-25 NOTE — TELEPHONE ENCOUNTER
HUB please read Ildefonso message below.     Also add    Please let patient know--   Fasting glucose was elevated; however, glucose came down at 1 hour and 2 hour joss. Blood glucose was under 140 for the 2 hour joss so this would not indicate diabetes. But patient does show evidence of impaired glucose tolerance. I'd recommend continuing with high protein focused diet and regular interval eating q4-6h.

## 2022-10-27 DIAGNOSIS — I61.9 CEREBROVASCULAR SMALL VESSEL DISEASE WITH HEMORRHAGE: Primary | ICD-10-CM

## 2022-10-27 DIAGNOSIS — R42 DIZZINESS: ICD-10-CM

## 2022-11-09 ENCOUNTER — TELEPHONE (OUTPATIENT)
Dept: INTERNAL MEDICINE | Facility: CLINIC | Age: 72
End: 2022-11-09

## 2022-11-09 NOTE — TELEPHONE ENCOUNTER
PATIENT CALLED IN REGARDS TO NEUROLOGY REFERRAL. SHE HAS NOT HEARD ANYTHING FROM THE OFFICE TO MAKE AN APPOINTMENT.  PLEASE CALL [phone]

## 2022-12-12 ENCOUNTER — TELEPHONE (OUTPATIENT)
Dept: INTERNAL MEDICINE | Facility: CLINIC | Age: 72
End: 2022-12-12

## 2022-12-12 NOTE — TELEPHONE ENCOUNTER
Caller: Jana Page    Relationship: Self    Best call back number: [phone]     What is the best time to reach you: ANY    Who are you requesting to speak with (clinical staff, provider,  specific staff member): CLINICAL    What was the call regarding: HAD LABS ON 12.7, AND YOU WOULD LIKE THE RESULTS. AND IT IS OK TO LEAVE A VOICEMAIL.    Do you require a callback: YES

## 2022-12-19 ENCOUNTER — OFFICE VISIT (OUTPATIENT)
Dept: CARDIOLOGY | Facility: CLINIC | Age: 72
End: 2022-12-19

## 2022-12-19 VITALS
HEART RATE: 55 BPM | DIASTOLIC BLOOD PRESSURE: 74 MMHG | BODY MASS INDEX: 27.71 KG/M2 | HEIGHT: 66 IN | WEIGHT: 172.4 LBS | SYSTOLIC BLOOD PRESSURE: 118 MMHG

## 2022-12-19 DIAGNOSIS — I10 ESSENTIAL HYPERTENSION: Chronic | ICD-10-CM

## 2022-12-19 DIAGNOSIS — Q23.1 BICUSPID AORTIC VALVE: ICD-10-CM

## 2022-12-19 DIAGNOSIS — E78.2 MIXED HYPERLIPIDEMIA: ICD-10-CM

## 2022-12-19 DIAGNOSIS — Z82.49 FAMILY HISTORY OF PREMATURE CORONARY ARTERY DISEASE: ICD-10-CM

## 2022-12-19 DIAGNOSIS — R93.1 AGATSTON CAC SCORE, >400: Primary | ICD-10-CM

## 2022-12-19 PROBLEM — H65.193 ACUTE EFFUSION OF BOTH MIDDLE EARS: Status: RESOLVED | Noted: 2022-09-23 | Resolved: 2022-12-19

## 2022-12-19 PROBLEM — Q23.81 BICUSPID AORTIC VALVE: Status: ACTIVE | Noted: 2022-12-19

## 2022-12-19 PROCEDURE — 99214 OFFICE O/P EST MOD 30 MIN: CPT | Performed by: INTERNAL MEDICINE

## 2022-12-19 PROCEDURE — 93000 ELECTROCARDIOGRAM COMPLETE: CPT | Performed by: INTERNAL MEDICINE

## 2022-12-19 NOTE — PROGRESS NOTES
Date of Office Visit: 22  Encounter Provider: Jd Mcmahon MD  Place of Service: [facility] CARDIOLOGY  Patient Name: Jana Page  :1950    Chief Complaint   Patient presents with   • Follow-up   :   HPI:     Ms. Page is 72 y.o. and presents today to follow up. I have reviewed prior notes and there are no changes except for any new updates described below. I have also reviewed any information entered into the medical record by the patient or by ancillary staff.     She has a history of hypertension and hyperlipidemia. Her father had premature CAD. She is a former smoker having quit in . She was seen in our office in 2017 for an abnormal calcium score; it was 659 (LM 0, , Cx 138, ).  An echo and perfusion stress were performed and were normal. She has been maintained on aspirin and atorvastatin.     She presented in 2021 with intermittent dizziness; it did not sound cardiac in etiology. An evaluation was performed that consisted of a normal Holter and perfusion stress test. An echo was normal except for a suggested bicuspid aortic valve with normal function. A CTA of the chest revealed normal aortic caliber (root 3.8cm, ascending 3.7cm).     She's doing well. She has no worrisome cardiac symptoms or limitations.     Past Medical History:   Diagnosis Date   • Anxiety    • Aortic atherosclerosis (HCC)    • Bicuspid aortic valve 2022   • DDD (degenerative disc disease), cervical    • Diverticulitis of large intestine    • Diverticulosis of intestine    • Fibrocystic breast changes    • Hyperlipidemia    • Hypertension    • Impaired fasting blood sugar    • Lateral epicondylitis of right elbow    • Lightheadedness 2/10/2017   • Lipoma of left lower extremity    • New daily persistent headache 3/9/2017   • Osteoarthritis of knee    • Osteopenia    • Pancreatic cyst     NO CHANGE IN  FROM    • Post-menopausal    • Scoliosis    •  Steatosis of liver    • Uterine leiomyoma        Past Surgical History:   Procedure Laterality Date   • COLONOSCOPY  2011       • COLONOSCOPY N/A 2022    Procedure: COLONOSCOPY TO CECUM AND TERMINAL ILEUM;  Surgeon: Jay Contreras MD;  Location: [facility] ENDOSCOPY;  Service: Gastroenterology;  Laterality: N/A;  SCREENING  DIVERTICULOSIS, INTERNAL HEMORRHOIDS       Social History     Socioeconomic History   • Marital status:    • Number of children: 2   Tobacco Use   • Smoking status: Former     Packs/day: 1.00     Years: 10.00     Pack years: 10.00     Types: Cigarettes     Start date:      Quit date:      Years since quittin.0   • Smokeless tobacco: Never   Vaping Use   • Vaping Use: Never used   Substance and Sexual Activity   • Alcohol use: Yes     Comment: social   • Drug use: Defer   • Sexual activity: Defer       Family History   Problem Relation Age of Onset   • Hypertension Mother    • Osteoarthritis Mother    • Breast cancer Mother         in situ   • Stroke Mother    • Diabetes Mother    • Atrial fibrillation Mother    • Heart attack Father 38   • Coronary artery disease Father         CABG   • Colon polyps Sister        Review of Systems   Constitutional: Positive for malaise/fatigue.   All other systems reviewed and are negative.      Allergies   Allergen Reactions   • Amoxicillin-Pot Clavulanate Diarrhea   • Ampicillin GI Intolerance         Current Outpatient Medications:   •  Accu-Chek FastClix Lancets misc, Use to check blood sugar once daily, Disp: 100 each, Rfl: 2  •  amLODIPine (NORVASC) 5 MG tablet, TAKE ONE TABLET BY MOUTH DAILY, Disp: 90 tablet, Rfl: 3  •  atorvastatin (LIPITOR) 20 MG tablet, TAKE ONE TABLET BY MOUTH DAILY, Disp: 90 tablet, Rfl: 3  •  Cholecalciferol (VITAMIN D) 1000 UNITS tablet, Take  by mouth., Disp: , Rfl:   •  Coenzyme Q10 (CO Q 10) 10 MG capsule, Take  by mouth Daily., Disp: , Rfl:   •  diazePAM (VALIUM) 5 MG tablet, Take 2 tablets 45  "minutes prior to MRI study.  Must have  to take home after study., Disp: 2 tablet, Rfl: 0  •  DULoxetine (CYMBALTA) 60 MG capsule, TAKE ONE CAPSULE BY MOUTH DAILY, Disp: 90 capsule, Rfl: 3  •  fluticasone (FLONASE) 50 MCG/ACT nasal spray, 2 sprays into the nostril(s) as directed by provider Daily. Administer 2 sprays in each nostril for each dose., Disp: , Rfl:   •  Multiple Vitamin (MULTIVITAMIN) capsule, Take 1 capsule by mouth Daily., Disp: , Rfl:   •  olmesartan (BENICAR) 40 MG tablet, TAKE ONE TABLET BY MOUTH DAILY, Disp: 90 tablet, Rfl: 3  •  Probiotic Product (PRO-BIOTIC BLEND) capsule, Take  by mouth Daily., Disp: , Rfl:   •  propranolol (INDERAL) 60 MG tablet, TAKE ONE TABLET BY MOUTH TWICE A DAY, Disp: 180 tablet, Rfl: 3      Objective:     Vitals:    12/19/22 1203   BP: 118/74   BP Location: Left arm   Pulse: 55   Weight: 78.2 kg (172 lb 6.4 oz)   Height: 167.6 cm (66\")     Body mass index is 27.83 kg/m².    Vitals reviewed.   Constitutional:       Appearance: Healthy appearance. Well-developed and not in distress.   Eyes:      Conjunctiva/sclera: Conjunctivae normal.   HENT:      Head: Normocephalic.      Nose: Nose normal.         Comments: Masked  Neck:      Vascular: No JVD. JVD normal.      Lymphadenopathy: No cervical adenopathy.   Pulmonary:      Effort: Pulmonary effort is normal.      Breath sounds: Normal breath sounds.   Cardiovascular:      Normal rate. Regular rhythm.      Murmurs: There is no murmur.   Pulses:     Intact distal pulses.   Edema:     Peripheral edema absent.   Abdominal:      Palpations: Abdomen is soft.      Tenderness: There is no abdominal tenderness.   Musculoskeletal: Normal range of motion.      Cervical back: Normal range of motion. Skin:     General: Skin is warm and dry.      Findings: No rash.   Neurological:      General: No focal deficit present.      Mental Status: Alert, oriented to person, place, and time and oriented to person, place and time.      " Cranial Nerves: No cranial nerve deficit.   Psychiatric:         Behavior: Behavior normal.         Thought Content: Thought content normal.         Judgment: Judgment normal.         ECG 12 Lead    Date/Time: 12/19/2022 12:19 PM  Performed by: Jd Mcmahon MD  Authorized by: Jd Mcmahon MD   Comparison: compared with previous ECG   Similar to previous ECG  Rhythm: sinus rhythm  Ectopy: atrial premature contractions  Conduction: conduction normal  ST Segments: ST segments normal  T Waves: T waves normal  QRS axis: normal  Other: no other findings    Clinical impression: non-specific ECG            Assessment:       Diagnosis Plan   1. Agatston CAC score, >400  ECG 12 Lead      2. Mixed hyperlipidemia        3. Family history of premature coronary artery disease        4. Essential hypertension        5. Bicuspid aortic valve  Adult Transthoracic Echo Complete W/ Cont if Necessary Per Protocol         Plan:     1/2/3. In 2017, she was noted to have a significantly elevated calcium score of 659.  She denies angina. She had a normal perfusion stress test in December 2021. She's on atorvastatin.     4. Her BP is well controlled.    5. An echo in 2021 suggested a bicuspid aortic valve; I appreciate no murmur so this may not be true. She had a CTA in 2021 that revealed normal aortic caliber. I'll repeat an echo in 2023.       Sincerely,       Jd Mcmahon MD

## 2022-12-27 DIAGNOSIS — I10 BENIGN ESSENTIAL HYPERTENSION: ICD-10-CM

## 2022-12-27 RX ORDER — PROPRANOLOL HYDROCHLORIDE 60 MG/1
TABLET ORAL
Qty: 180 TABLET | Refills: 3 | Status: SHIPPED | OUTPATIENT
Start: 2022-12-27

## 2023-01-12 ENCOUNTER — OFFICE VISIT (OUTPATIENT)
Dept: NEUROLOGY | Facility: CLINIC | Age: 73
End: 2023-01-12
Payer: MEDICARE

## 2023-01-12 VITALS
HEART RATE: 68 BPM | SYSTOLIC BLOOD PRESSURE: 118 MMHG | HEIGHT: 66 IN | RESPIRATION RATE: 14 BRPM | DIASTOLIC BLOOD PRESSURE: 74 MMHG | BODY MASS INDEX: 27.74 KG/M2 | WEIGHT: 172.62 LBS

## 2023-01-12 DIAGNOSIS — I63.89 OTHER CEREBRAL INFARCTION: ICD-10-CM

## 2023-01-12 DIAGNOSIS — I69.30 SEQUELAE, POST-STROKE: Primary | ICD-10-CM

## 2023-01-12 PROCEDURE — 99205 OFFICE O/P NEW HI 60 MIN: CPT | Performed by: PSYCHIATRY & NEUROLOGY

## 2023-01-12 NOTE — PROGRESS NOTES
CC: Microvascular disease with evidence of small bilateral hemorrhagic thalamic lesions.    HPI:  Jana Page is a  72 y.o. right-handed white female who was sent for neurological consultation by Dr. Trinidad regarding her abnormal MRI of the brain.  The patient had had a period of headaches plus some dizziness and subsequently had an MRI brain.  It showed moderate microvascular changes including the luba as well as some hemosiderin seen on both sides of the thalamus consistent with microhemorrhages.  The patient's headache issues have disappeared and she specifically denies any episodes of acute focal neurologic deficit.  She does admit to having some minor memory problems which have slowly developed but they do not interfere with day-to-day activities.  She continues to live in her house and perform all activities of daily living without difficulty.    She has history of hypertension which was relatively difficult to control but has been under good control for quite some time.  She states that the time she was having headaches she was not having any problem with her blood pressure control.  She also has hyperlipidemia and has been found to be prediabetic.  She smoked some while in college but when she became pregnant at age 29 she stopped smoking.  She specifically denies any one-sided neurologic symptoms such as one-sided weakness, numbness, incoordination or any visual problems.  She denies any language disturbance.    Review of her MRI of the brain demonstrates moderate microvascular changes with some confluent changes in both cerebral hemispheres a little more prominent on the right.  There is also evidence in the luba for this.  The gradient echo images demonstrate some hemosiderin in both thalami.    The patient had a previous carotid duplex in 2017 demonstrating atherosclerotic plaque but no significant stenosis in the carotids.  Vertebral flow was antegrade bilaterally    Past Medical History:    Diagnosis Date   • Anxiety    • Aortic atherosclerosis (HCC)    • Bicuspid aortic valve 12/19/2022   • DDD (degenerative disc disease), cervical    • Diverticulitis of large intestine    • Diverticulosis of intestine    • Fibrocystic breast changes    • Hyperlipidemia    • Hypertension    • Impaired fasting blood sugar    • Lateral epicondylitis of right elbow    • Lightheadedness 2/10/2017   • Lipoma of left lower extremity    • New daily persistent headache 3/9/2017   • Osteoarthritis of knee    • Osteopenia    • Pancreatic cyst     NO CHANGE IN 2012 FROM 2009   • Post-menopausal    • Scoliosis    • Steatosis of liver    • Uterine leiomyoma          Past Surgical History:   Procedure Laterality Date   • COLONOSCOPY  06/14/2011       • COLONOSCOPY N/A 4/26/2022    Procedure: COLONOSCOPY TO CECUM AND TERMINAL ILEUM;  Surgeon: Jay Contreras MD;  Location: [facility] ENDOSCOPY;  Service: Gastroenterology;  Laterality: N/A;  SCREENING  DIVERTICULOSIS, INTERNAL HEMORRHOIDS           Current Outpatient Medications:   •  Accu-Chek FastClix Lancets misc, Use to check blood sugar once daily, Disp: 100 each, Rfl: 2  •  amLODIPine (NORVASC) 5 MG tablet, TAKE ONE TABLET BY MOUTH DAILY, Disp: 90 tablet, Rfl: 3  •  atorvastatin (LIPITOR) 20 MG tablet, TAKE ONE TABLET BY MOUTH DAILY, Disp: 90 tablet, Rfl: 3  •  Cholecalciferol (VITAMIN D) 1000 UNITS tablet, Take  by mouth., Disp: , Rfl:   •  Coenzyme Q10 (CO Q 10) 10 MG capsule, Take  by mouth Daily., Disp: , Rfl:   •  diazePAM (VALIUM) 5 MG tablet, Take 2 tablets 45 minutes prior to MRI study.  Must have  to take home after study., Disp: 2 tablet, Rfl: 0  •  DULoxetine (CYMBALTA) 60 MG capsule, TAKE ONE CAPSULE BY MOUTH DAILY, Disp: 90 capsule, Rfl: 3  •  fluticasone (FLONASE) 50 MCG/ACT nasal spray, 2 sprays into the nostril(s) as directed by provider Daily. Administer 2 sprays in each nostril for each dose., Disp: , Rfl:   •  Multiple Vitamin (MULTIVITAMIN)  capsule, Take 1 capsule by mouth Daily., Disp: , Rfl:   •  olmesartan (BENICAR) 40 MG tablet, TAKE ONE TABLET BY MOUTH DAILY, Disp: 90 tablet, Rfl: 3  •  Probiotic Product (PRO-BIOTIC BLEND) capsule, Take  by mouth Daily., Disp: , Rfl:   •  propranolol (INDERAL) 60 MG tablet, TAKE ONE TABLET BY MOUTH TWICE A DAY, Disp: 180 tablet, Rfl: 3      Family History   Problem Relation Age of Onset   • Hypertension Mother    • Osteoarthritis Mother    • Breast cancer Mother         in situ   • Stroke Mother    • Diabetes Mother    • Atrial fibrillation Mother    • Heart attack Father 38   • Coronary artery disease Father         CABG   • Colon polyps Sister          Social History     Socioeconomic History   • Marital status:    • Number of children: 2   Tobacco Use   • Smoking status: Former     Packs/day: 1.00     Years: 10.00     Pack years: 10.00     Types: Cigarettes     Start date:      Quit date:      Years since quittin.0   • Smokeless tobacco: Never   Vaping Use   • Vaping Use: Never used   Substance and Sexual Activity   • Alcohol use: Yes     Comment: social   • Drug use: Defer   • Sexual activity: Defer         Allergies   Allergen Reactions   • Amoxicillin-Pot Clavulanate Diarrhea   • Ampicillin GI Intolerance         Pain Scale: Chronic back and neck pain which is intermittent.        ROS:  Review of Systems   Constitutional: Positive for activity change and fatigue. Negative for chills.   HENT: Negative for drooling, hearing loss, nosebleeds, tinnitus and trouble swallowing.    Eyes: Negative for photophobia and visual disturbance.   Respiratory: Negative for chest tightness, shortness of breath and wheezing.    Cardiovascular: Negative for chest pain, palpitations and leg swelling.   Gastrointestinal: Negative for abdominal pain, blood in stool and rectal pain.   Endocrine: Negative for cold intolerance and heat intolerance.   Genitourinary: Negative for difficulty urinating, frequency,  "urgency and vaginal pain.   Musculoskeletal: Positive for back pain, gait problem, neck pain and neck stiffness.   Allergic/Immunologic: Negative for food allergies.   Neurological: Positive for numbness. Negative for dizziness, tremors, speech difficulty, weakness and headaches.   Hematological: Does not bruise/bleed easily.   Psychiatric/Behavioral: Positive for decreased concentration. Negative for agitation, behavioral problems and sleep disturbance. The patient is nervous/anxious.          I have reviewed and agree with the above ROS completed by the medical assistant.      Physical Exam:  Vitals:    01/12/23 0937   BP: 118/74   Pulse: 68   Resp: 14   Weight: 78.3 kg (172 lb 9.9 oz)   Height: 167.6 cm (65.98\")     Orthostatic BP:    Body mass index is 27.88 kg/m².    Physical Exam  General: Overweight white female no acute distress  HEENT: Normocephalic no evidence of trauma.  Discs are flat.  She is status post cataract extractions.  Throat  Neck: Supple.  No thyromegaly.  No cervical bruits.  Heart: Regular rate and rhythm no murmurs.  No pedal edema.  Extremities: Radial pulses were strong and simultaneous      Neurological Exam:   Mental Status: Awake, alert, oriented to person, place and time.  Conversant without evidence of an affective disorder, thought disorder, delusions or hallucinations.  Attention span and concentration are normal.  HCF: No aphasia, apraxia or dysarthria.  Recent and remote memory intact.  Knowledge of recent events intact.  CN: I:   II: Visual fields full without left inattention   III, IV, VI: Eye movements intact without nystagmus or ptosis.  Pupils equal   round and reactive to light.   V,VII: Light touch and pinprick intact all 3 divisions of V.  Facial muscles symmetrical.   VIII: Hearing intact to finger rub   IX,X: Soft palate elevates symmetrically   XI: Sternomastoid and trapezius are strong.   XII: Tongue midline without atrophy or fasciculations  Motor: Normal tone and " bulk in the upper and lower extremities   Power testing: Full power in all muscles tested arms and legs  Reflexes: Upper extremities: +1 diffusely        Lower extremities: +1 knee jerks absent ankle jerk        Toe signs: Downgoing bilaterally  Sensory: Light touch: Diffusely intact arms and leg        Pinprick: Diffusely intact arms and leg        Vibration: Intact at the ankles        Position: Intact at the great toes    Cerebellar: Finger-to-nose: Normal           Rapid movement: Normal           Heel-to-shin: Normal  Gait and Station: Casual toe heel and tandem walk were normal.  No Romberg and no drift    Results:      Lab Results   Component Value Date    GLUCOSE 128 (H) 09/12/2022    BUN 10 09/12/2022    CREATININE 0.69 09/12/2022    EGFRIFNONA 46 (L) 02/10/2022    EGFRIFAFRI 91 09/17/2021    BCR 14.5 09/12/2022    CO2 29.5 (H) 09/12/2022    CALCIUM 9.8 09/12/2022    PROTENTOTREF 7.0 09/12/2022    ALBUMIN 5.10 09/12/2022    LABIL2 2.7 09/12/2022    AST 24 09/12/2022    ALT 25 09/12/2022       Lab Results   Component Value Date    WBC 7.09 09/12/2022    HGB 14.8 09/12/2022    HCT 44.4 09/12/2022    MCV 90.4 09/12/2022     09/12/2022         .No results found for: RPR      Lab Results   Component Value Date    TSH 1.440 09/12/2022         No results found for: [id number]      No results found for: FOLATE      Lab Results   Component Value Date    HGBA1C 5.70 (H) 09/12/2022         Lab Results   Component Value Date    GLUCOSE 128 (H) 09/12/2022    BUN 10 09/12/2022    CREATININE 0.69 09/12/2022    EGFRIFNONA 46 (L) 02/10/2022    EGFRIFAFRI 91 09/17/2021    BCR 14.5 09/12/2022    K 4.0 09/12/2022    CO2 29.5 (H) 09/12/2022    CALCIUM 9.8 09/12/2022    PROTENTOTREF 7.0 09/12/2022    ALBUMIN 5.10 09/12/2022    LABIL2 2.7 09/12/2022    AST 24 09/12/2022    ALT 25 09/12/2022         Lab Results   Component Value Date    WBC 7.09 09/12/2022    HGB 14.8 09/12/2022    HCT 44.4 09/12/2022    MCV 90.4 09/12/2022      09/12/2022             Assessment:   1.  Microvascular disease of the brain with some lesions in typical artery distributions consistent with small stroke lacunar strokes plus evidence of hemorrhage in both thalami.  2.  Chronic neck and low back pain without evidence of radiculopathy        Plan:  1.  Recommend optimal risk factor control of her blood pressure (130/80 or less) as well as her LDL (less than 70).  Her LDL values generally are running in the 80s which is not quite at target.  We discussed options which may include increasing her atorvastatin from 20 mg daily to 40 mg daily with some added risk of muscle complications.  She describes some tendency towards muscle cramping already.  An alternative would be to add Zetia 10 mg daily which often adds about a 20 point drop in LDL.  I asked her to discuss these options further with her primary care physician.  Her LDL does not seem to be out of control sufficiently to suggest Repatha.  2.  Continue surveillance of her prediabetes.  Some weight loss and dietary restraint are recommended to keep this down.  3.  I recommended she take aspirin 81 mg daily.  4.  Follow-up with us as needed.      Time: 60 minutes        2/2/2023  Addendum:  A carotid ultrasound was obtained which showed plaquing in the carotids without hemodynamically significant stenosis.  The vertebral flow was antegrade bilaterally.  No further recommendations beyond risk factor control and aspirin 81 mg daily.  Message to Dr. Trinidad.    CONOR              Dictated utilizing Dragon dictation.

## 2023-01-12 NOTE — LETTER
January 12, 2023     Sabino Trinidad MD  [address]  [address] [address]    Patient: Jana Page   YOB: 1950   Date of Visit: 1/12/2023     Dear Dr. Amos MD:    Thank you for referring Jana Page to me for evaluation. Below are the relevant portions of my assessment and plan of care.    If you have questions, please do not hesitate to call me. I look forward to following Jana along with you.         Sincerely,        Luis Alberto Hoyos MD        CC: No Recipients    Progress Notes:  No notes on file

## 2023-01-17 ENCOUNTER — APPOINTMENT (OUTPATIENT)
Dept: WOMENS IMAGING | Facility: HOSPITAL | Age: 73
End: 2023-01-17
Payer: MEDICARE

## 2023-01-17 PROCEDURE — 77067 SCR MAMMO BI INCL CAD: CPT | Performed by: RADIOLOGY

## 2023-01-17 PROCEDURE — 77063 BREAST TOMOSYNTHESIS BI: CPT | Performed by: RADIOLOGY

## 2023-01-19 ENCOUNTER — PATIENT ROUNDING (BHMG ONLY) (OUTPATIENT)
Dept: NEUROLOGY | Facility: CLINIC | Age: 73
End: 2023-01-19
Payer: MEDICARE

## 2023-01-19 ENCOUNTER — HOSPITAL ENCOUNTER (OUTPATIENT)
Dept: CARDIOLOGY | Facility: HOSPITAL | Age: 73
Discharge: HOME | End: 2023-01-19
Admitting: PSYCHIATRY & NEUROLOGY
Payer: MEDICARE

## 2023-01-19 DIAGNOSIS — I69.30 SEQUELAE, POST-STROKE: ICD-10-CM

## 2023-01-19 DIAGNOSIS — I63.89 OTHER CEREBRAL INFARCTION: ICD-10-CM

## 2023-01-19 LAB
BH CV XLRA MEAS LEFT DIST CCA EDV: -18 CM/SEC
BH CV XLRA MEAS LEFT DIST CCA PSV: -59.2 CM/SEC
BH CV XLRA MEAS LEFT DIST ICA EDV: -19.6 CM/SEC
BH CV XLRA MEAS LEFT DIST ICA PSV: -49.7 CM/SEC
BH CV XLRA MEAS LEFT ICA/CCA RATIO: 1.09
BH CV XLRA MEAS LEFT MID ICA EDV: -19.6 CM/SEC
BH CV XLRA MEAS LEFT MID ICA PSV: -64.3 CM/SEC
BH CV XLRA MEAS LEFT PROX CCA EDV: -17.2 CM/SEC
BH CV XLRA MEAS LEFT PROX CCA PSV: -62.3 CM/SEC
BH CV XLRA MEAS LEFT PROX ECA EDV: -8.1 CM/SEC
BH CV XLRA MEAS LEFT PROX ECA PSV: -51.8 CM/SEC
BH CV XLRA MEAS LEFT PROX ICA EDV: -12.6 CM/SEC
BH CV XLRA MEAS LEFT PROX ICA PSV: -36.8 CM/SEC
BH CV XLRA MEAS LEFT PROX SCLA PSV: 103 CM/SEC
BH CV XLRA MEAS LEFT VERTEBRAL A EDV: 5.9 CM/SEC
BH CV XLRA MEAS LEFT VERTEBRAL A PSV: 34.9 CM/SEC
BH CV XLRA MEAS RIGHT DIST CCA EDV: -13.2 CM/SEC
BH CV XLRA MEAS RIGHT DIST CCA PSV: -53.6 CM/SEC
BH CV XLRA MEAS RIGHT DIST ICA EDV: -16.5 CM/SEC
BH CV XLRA MEAS RIGHT DIST ICA PSV: -47.4 CM/SEC
BH CV XLRA MEAS RIGHT ICA/CCA RATIO: 0.87
BH CV XLRA MEAS RIGHT MID ICA EDV: -19.1 CM/SEC
BH CV XLRA MEAS RIGHT MID ICA PSV: -44.4 CM/SEC
BH CV XLRA MEAS RIGHT PROX CCA EDV: 14.3 CM/SEC
BH CV XLRA MEAS RIGHT PROX CCA PSV: 60.4 CM/SEC
BH CV XLRA MEAS RIGHT PROX ECA EDV: -10.2 CM/SEC
BH CV XLRA MEAS RIGHT PROX ECA PSV: -48.7 CM/SEC
BH CV XLRA MEAS RIGHT PROX ICA EDV: -13.3 CM/SEC
BH CV XLRA MEAS RIGHT PROX ICA PSV: -46.6 CM/SEC
BH CV XLRA MEAS RIGHT PROX SCLA PSV: 91 CM/SEC
BH CV XLRA MEAS RIGHT VERTEBRAL A EDV: 13.3 CM/SEC
BH CV XLRA MEAS RIGHT VERTEBRAL A PSV: 47 CM/SEC
LEFT ARM BP: NORMAL MMHG
MAXIMAL PREDICTED HEART RATE: 148 BPM
RIGHT ARM BP: NORMAL MMHG
STRESS TARGET HR: 126 BPM

## 2023-01-19 PROCEDURE — 93880 EXTRACRANIAL BILAT STUDY: CPT

## 2023-01-24 ENCOUNTER — TELEPHONE (OUTPATIENT)
Dept: INTERNAL MEDICINE | Facility: CLINIC | Age: 73
End: 2023-01-24

## 2023-01-24 NOTE — TELEPHONE ENCOUNTER
Caller: Jana Page    Relationship: Self    Best call back number: [phone]    What medications are you currently taking:   Current Outpatient Medications on File Prior to Visit   Medication Sig Dispense Refill   • Accu-Chek FastClix Lancets misc Use to check blood sugar once daily 100 each 2   • amLODIPine (NORVASC) 5 MG tablet TAKE ONE TABLET BY MOUTH DAILY 90 tablet 3   • atorvastatin (LIPITOR) 20 MG tablet TAKE ONE TABLET BY MOUTH DAILY 90 tablet 3   • Cholecalciferol (VITAMIN D) 1000 UNITS tablet Take  by mouth.     • Coenzyme Q10 (CO Q 10) 10 MG capsule Take  by mouth Daily.     • diazePAM (VALIUM) 5 MG tablet Take 2 tablets 45 minutes prior to MRI study.  Must have  to take home after study. 2 tablet 0   • DULoxetine (CYMBALTA) 60 MG capsule TAKE ONE CAPSULE BY MOUTH DAILY 90 capsule 3   • fluticasone (FLONASE) 50 MCG/ACT nasal spray 2 sprays into the nostril(s) as directed by provider Daily. Administer 2 sprays in each nostril for each dose.     • Multiple Vitamin (MULTIVITAMIN) capsule Take 1 capsule by mouth Daily.     • olmesartan (BENICAR) 40 MG tablet TAKE ONE TABLET BY MOUTH DAILY 90 tablet 3   • Probiotic Product (PRO-BIOTIC BLEND) capsule Take  by mouth Daily.     • propranolol (INDERAL) 60 MG tablet TAKE ONE TABLET BY MOUTH TWICE A  tablet 3     No current facility-administered medications on file prior to visit.          What are your concerns: PATIENT STATES THAT SHE SAW THE NEUROLOGIST THAT DR. MARQUEZ SENT THE PATIENT TO, AND THEY STATED THAT IT WOULDN'T HURT FOR HER TO CHANGE HER MEDICINE FOR CHOLESTEROL. SHE STATES SHE DIDN'T KNOW IF THE NEUROLOGIST HAD CONTACTED AND SPOKE WITH DR. MARQUEZ ABOUT THIS, OR IF DR. MARQUEZ NEEDED TO CONTACT HIM. PLEASE ADVISE PATIENT OF ANY CHANGES THAT ARE BEING MADE TO HER PRESCRIPTIONS. SHE STATES THAT SHE HAS NOT HEARD ANYTHING BACK FROM HER PHARMACY YET ABOUT A NEW MEDICATION.

## 2023-01-24 NOTE — TELEPHONE ENCOUNTER
Caller: Jana Page    Relationship: Self    Best call back number: [phone]    What test was performed: ULTRASOUND    When was the test performed: 01/19    Where was the test performed: [facility]     Additional notes: PLEASE ADVISE PATIENT ON RESULTS

## 2023-01-25 NOTE — TELEPHONE ENCOUNTER
He was telling her that her LDL needs to be a little lower.  Either the statin could be increased a little (but she easily gets cramping) or we could try adding zetia to it.  Do either of those options sound better to her?

## 2023-01-25 NOTE — TELEPHONE ENCOUNTER
Attempted to call patient, LVM    HUB TO READ     He was telling her that her LDL needs to be a little lower.  Either the statin could be increased a little (but she easily gets cramping) or we could try adding zetia to it.  Do either of those options sound better to her?

## 2023-01-26 ENCOUNTER — TELEPHONE (OUTPATIENT)
Dept: INTERNAL MEDICINE | Facility: CLINIC | Age: 73
End: 2023-01-26
Payer: MEDICARE

## 2023-01-26 NOTE — TELEPHONE ENCOUNTER
Pt called and stated that she would do whatever you think is best for her cholestrol she stated she thought steve was going to reach out to you about the situatuion he gave her an suggestion she just cant remember the name of the medication

## 2023-02-01 ENCOUNTER — TELEPHONE (OUTPATIENT)
Dept: NEUROLOGY | Facility: CLINIC | Age: 73
End: 2023-02-01
Payer: MEDICARE

## 2023-02-01 NOTE — TELEPHONE ENCOUNTER
Caller: BELKIS Ng call back number: [phone] CAN LEAVE A DETAILED MESS IF GET V.M     Caller requesting test results: BELKIS    What test was performed: DUPLEX CAROTID     When was the test performed: DUPLEX CAROTID     Where was the test performed: [facility]     Additional notes: PT WOULD LIKE HER TEST RESULTS/ ALSO YOU WERE GOING TO TALK WITH DR MARQUEZ REG PT RX FOR CHOLESTEROL?  TO POSSIBLE CHANGE? NO ONE AT HIS OFFICE WAS CALLED?     I WOULD LIKE A CALL TO INFORM ME OF TEST RESULTS ?  HOW WOULD A PERSON KNOW ABOUT THEIR TEST RESULTS IF NOT ON MY CHART? ALSO  DID YOU TALK TO DR MARQUEZ REGARDING WHAT WAS DISCUSSED AT OUR APPT.([facility] ) ??      PLEASE ADVISE.

## 2023-02-03 NOTE — TELEPHONE ENCOUNTER
Mirela,  Please let patient know that her carotid ultrasound showed some plaquing but no significant narrowing similar to the previous study.  I have sent my note with addendum to Dr. Trinidad.    I am unsure why she is unable to see the carotid ultrasound results in my chart.    CONOR

## 2023-03-17 DIAGNOSIS — I10 BENIGN ESSENTIAL HYPERTENSION: ICD-10-CM

## 2023-03-17 RX ORDER — AMLODIPINE BESYLATE 5 MG/1
TABLET ORAL
Qty: 90 TABLET | Refills: 3 | Status: SHIPPED | OUTPATIENT
Start: 2023-03-17

## 2023-03-21 DIAGNOSIS — I10 BENIGN ESSENTIAL HYPERTENSION: ICD-10-CM

## 2023-03-21 RX ORDER — OLMESARTAN MEDOXOMIL 40 MG/1
TABLET ORAL
Qty: 90 TABLET | Refills: 1 | Status: SHIPPED | OUTPATIENT
Start: 2023-03-21

## 2023-04-19 ENCOUNTER — OFFICE VISIT (OUTPATIENT)
Dept: INTERNAL MEDICINE | Facility: CLINIC | Age: 73
End: 2023-04-19
Payer: MEDICARE

## 2023-04-19 VITALS
BODY MASS INDEX: 29.32 KG/M2 | RESPIRATION RATE: 18 BRPM | DIASTOLIC BLOOD PRESSURE: 80 MMHG | WEIGHT: 176 LBS | HEART RATE: 66 BPM | SYSTOLIC BLOOD PRESSURE: 128 MMHG | OXYGEN SATURATION: 98 % | HEIGHT: 65 IN

## 2023-04-19 DIAGNOSIS — R73.03 PREDIABETES: Chronic | ICD-10-CM

## 2023-04-19 DIAGNOSIS — E78.2 MIXED HYPERLIPIDEMIA: Chronic | ICD-10-CM

## 2023-04-19 DIAGNOSIS — I10 ESSENTIAL HYPERTENSION: Primary | Chronic | ICD-10-CM

## 2023-04-19 DIAGNOSIS — I67.9 CEREBROVASCULAR SMALL VESSEL DISEASE: ICD-10-CM

## 2023-04-19 DIAGNOSIS — F41.1 GENERALIZED ANXIETY DISORDER: Chronic | ICD-10-CM

## 2023-04-19 LAB
ALBUMIN SERPL-MCNC: 4.9 G/DL (ref 3.5–5.2)
ALBUMIN/GLOB SERPL: 2.3 G/DL
ALP SERPL-CCNC: 96 U/L (ref 39–117)
ALT SERPL-CCNC: 25 U/L (ref 1–33)
AST SERPL-CCNC: 18 U/L (ref 1–32)
BILIRUB SERPL-MCNC: 0.7 MG/DL (ref 0–1.2)
BUN SERPL-MCNC: 12 MG/DL (ref 8–23)
BUN/CREAT SERPL: 15.4 (ref 7–25)
CALCIUM SERPL-MCNC: 10.2 MG/DL (ref 8.6–10.5)
CHLORIDE SERPL-SCNC: 104 MMOL/L (ref 98–107)
CHOLEST SERPL-MCNC: 175 MG/DL (ref 0–200)
CO2 SERPL-SCNC: 25.9 MMOL/L (ref 22–29)
CREAT SERPL-MCNC: 0.78 MG/DL (ref 0.57–1)
EGFRCR SERPLBLD CKD-EPI 2021: 80.8 ML/MIN/1.73
ERYTHROCYTE [DISTWIDTH] IN BLOOD BY AUTOMATED COUNT: 12.5 % (ref 12.3–15.4)
GLOBULIN SER CALC-MCNC: 2.1 GM/DL
GLUCOSE SERPL-MCNC: 134 MG/DL (ref 65–99)
HBA1C MFR BLD: 5.9 % (ref 4.8–5.6)
HCT VFR BLD AUTO: 44.6 % (ref 34–46.6)
HDLC SERPL-MCNC: 62 MG/DL (ref 40–60)
HGB BLD-MCNC: 15.2 G/DL (ref 12–15.9)
LDLC SERPL CALC-MCNC: 88 MG/DL (ref 0–100)
LDLC/HDLC SERPL: 1.35 {RATIO}
MCH RBC QN AUTO: 29.8 PG (ref 26.6–33)
MCHC RBC AUTO-ENTMCNC: 34.1 G/DL (ref 31.5–35.7)
MCV RBC AUTO: 87.5 FL (ref 79–97)
PLATELET # BLD AUTO: 330 10*3/MM3 (ref 140–450)
POTASSIUM SERPL-SCNC: 4.4 MMOL/L (ref 3.5–5.2)
PROT SERPL-MCNC: 7 G/DL (ref 6–8.5)
RBC # BLD AUTO: 5.1 10*6/MM3 (ref 3.77–5.28)
SODIUM SERPL-SCNC: 141 MMOL/L (ref 136–145)
TRIGL SERPL-MCNC: 145 MG/DL (ref 0–150)
VLDLC SERPL CALC-MCNC: 25 MG/DL (ref 5–40)
WBC # BLD AUTO: 12.6 10*3/MM3 (ref 3.4–10.8)

## 2023-04-19 PROCEDURE — 3074F SYST BP LT 130 MM HG: CPT | Performed by: FAMILY MEDICINE

## 2023-04-19 PROCEDURE — 1159F MED LIST DOCD IN RCRD: CPT | Performed by: FAMILY MEDICINE

## 2023-04-19 PROCEDURE — 3079F DIAST BP 80-89 MM HG: CPT | Performed by: FAMILY MEDICINE

## 2023-04-19 PROCEDURE — 1160F RVW MEDS BY RX/DR IN RCRD: CPT | Performed by: FAMILY MEDICINE

## 2023-04-19 PROCEDURE — 99214 OFFICE O/P EST MOD 30 MIN: CPT | Performed by: FAMILY MEDICINE

## 2023-04-19 RX ORDER — ASPIRIN 81 MG/1
81 TABLET, CHEWABLE ORAL DAILY
COMMUNITY

## 2023-04-19 NOTE — PROGRESS NOTES
"Chief Complaint  Follow-up and Hypertension    Subjective        Jana Page presents to [facility]  History of Present Illness     Hypertension - stable in the office today.  Patient taking medication as prescribed.  Patient is taking amlodipine,olmesartan, propranolol.  Denies side effects or chest pain.     HLD-  Patient taking atorvastatin as prescribed.  Trying to adhere to a low-fat diet.  Denies myalgias.  Due for recheck.  She does have moderate small vessel disease so will need to increase statin accordingly.       Prediabetes-  No new numbness, tingling.  Patient is taking no meds currently.  Diet controlled.  Due for recheck    Anxiety disorder-stable.  Patient is taking cymbalta.  The patient is not having any side effects.  Denies depression, poor concentration, poor sleep, fatigue, irritability.       Objective   Vital Signs:  /80 (BP Location: Left arm, Patient Position: Sitting, Cuff Size: Small Adult)   Pulse 66   Resp 18   Ht 165.1 cm (65\")   Wt 79.8 kg (176 lb)   SpO2 98%   BMI 29.29 kg/m²   Estimated body mass index is 29.29 kg/m² as calculated from the following:    Height as of this encounter: 165.1 cm (65\").    Weight as of this encounter: 79.8 kg (176 lb).             Physical Exam  Vitals and nursing note reviewed.   Constitutional:       General: She is not in acute distress.     Appearance: Normal appearance.   Cardiovascular:      Rate and Rhythm: Normal rate and regular rhythm.      Heart sounds: Normal heart sounds. No murmur heard.  Pulmonary:      Effort: Pulmonary effort is normal.      Breath sounds: Normal breath sounds.   Neurological:      Mental Status: She is alert.        Result Review :  The following data was reviewed by: Sabino Trinidad MD on 04/19/2023:  Common labs        9/12/2022    09:27 12/7/2022    08:59   Common Labs   Glucose 128      BUN 10      Creatinine 0.69      Sodium 143      Potassium 4.0      Chloride 104   "    Calcium 9.8      Total Protein 7.0      Albumin 5.10      Total Bilirubin 0.5      Alkaline Phosphatase 87      AST (SGOT) 24      ALT (SGPT) 25      WBC 7.09      Hemoglobin 14.8      Hematocrit 44.4      Platelets 310      Total Cholesterol  174     Triglycerides  176     HDL Cholesterol  58     LDL Cholesterol   86     Hemoglobin A1C 5.70                     Assessment and Plan   Diagnoses and all orders for this visit:    1. Essential hypertension (Primary)  -     CBC (No Diff)  -     Comprehensive Metabolic Panel    2. Mixed hyperlipidemia  -     CBC (No Diff)  -     Comprehensive Metabolic Panel  -     Lipid Panel With LDL / HDL Ratio    3. Prediabetes  -     CBC (No Diff)  -     Comprehensive Metabolic Panel  -     Hemoglobin A1c    4. Generalized anxiety disorder    5. Cerebrovascular small vessel disease         Clinically stable chronic conditions as above.  Continue all medications as above. Labs reviewed/ordered as above.  Will increase atorvastatin to 40 or 80mg depending on lipid results.  Already on ASA for small vessel disease.           Follow Up   Return in about 6 months (around 10/19/2023) for Recheck.  Patient was given instructions and counseling regarding her condition or for health maintenance advice. Please see specific information pulled into the AVS if appropriate.

## 2023-04-19 NOTE — PATIENT INSTRUCTIONS
"MyChart Tips:    MyChart is a useful part of our patient care program and is a great way for us to communicate lab results and for you to request refills.  Not all medical questions are appropriate for [employer] such as new medical issues that require taking a history, performing an exam, getting labs or studies, or researching medical questions needed to be addressed in the office.    Examples of medical issues that are APPROPRIATE for [employer]:  -Follow-up on problems we have already addressed in a visit such as home testing results, blood pressure readings, glucose readings  -Questions that can be answered with a simple \"yes\" or \"no\"    Communication that is NOT appropriate for MyChart:  -[employer] is not for new problems, serious problems or urgent problems.  [employer] messages are not email.  Staff will check messages each weekday.  We strive for a 48-hour turnaround on messaging.  Urgent matters should be addressed by phone, in the office, or the ER.  -[employer] is not for private issues.  Messages are received first by our office staff    Please allow up to 7 business days for lab results to be sent through [employer] to you before contacting your provider.  Sometimes we are waiting for results to get back from the lab and also your provider needs time to analyze them thoroughly before making recommendations.  While the internet has great resources, it is not a substitute for interpreting lab results.    Be mindful that [employer] messages become part of the permanent medical record.    We appreciate your respect for the limitations and boundaries of [employer].   "

## 2023-04-25 ENCOUNTER — TELEPHONE (OUTPATIENT)
Dept: INTERNAL MEDICINE | Facility: CLINIC | Age: 73
End: 2023-04-25

## 2023-04-25 NOTE — TELEPHONE ENCOUNTER
Caller: Jana Page    Relationship: Self    Best call back number:   [phone] (Mobile)     Caller requesting test results: Jana Page    What test was performed: BLOOD WORK    When was the test performed: 04/19/2023    Where was the test performed: IN OFFICE    Additional notes: PATIENT IS ASKING FOR A CALL BACK WITH HER LAB WORK RESULTS BECAUSE SHE DOES NOT USE MYCHART, PLEASE CALL PATIENT BACK ASAP

## 2023-04-28 DIAGNOSIS — E78.00 HYPERCHOLESTEROLEMIA: ICD-10-CM

## 2023-04-28 RX ORDER — ATORVASTATIN CALCIUM 20 MG/1
TABLET, FILM COATED ORAL
Qty: 30 TABLET | Refills: 2 | Status: SHIPPED | OUTPATIENT
Start: 2023-04-28

## 2023-05-12 DIAGNOSIS — R42 LIGHTHEADEDNESS: ICD-10-CM

## 2023-05-12 DIAGNOSIS — F41.9 ANXIETY: ICD-10-CM

## 2023-05-12 RX ORDER — DULOXETIN HYDROCHLORIDE 60 MG/1
CAPSULE, DELAYED RELEASE ORAL
Qty: 90 CAPSULE | Refills: 3 | Status: SHIPPED | OUTPATIENT
Start: 2023-05-12

## 2023-07-17 PROBLEM — I25.10 CORONARY ARTERY CALCIFICATION: Status: ACTIVE | Noted: 2023-07-17

## 2023-07-17 PROBLEM — R10.30 LOWER ABDOMINAL PAIN: Status: RESOLVED | Noted: 2022-02-10 | Resolved: 2023-07-17

## 2023-07-17 PROBLEM — R42 DIZZINESS: Status: RESOLVED | Noted: 2021-10-04 | Resolved: 2023-07-17

## 2023-07-17 PROBLEM — I70.0 AORTIC ATHEROSCLEROSIS: Chronic | Status: RESOLVED | Noted: 2017-02-07 | Resolved: 2023-07-17

## 2023-07-17 PROBLEM — I25.84 CORONARY ARTERY CALCIFICATION: Status: ACTIVE | Noted: 2023-07-17

## 2023-07-17 PROBLEM — I65.23 CAROTID ARTERY PLAQUE, BILATERAL: Status: ACTIVE | Noted: 2023-01-01

## 2023-08-08 ENCOUNTER — HOSPITAL ENCOUNTER (OUTPATIENT)
Dept: CARDIOLOGY | Facility: HOSPITAL | Age: 73
Discharge: HOME | End: 2023-08-08
Payer: MEDICARE

## 2023-08-08 VITALS
SYSTOLIC BLOOD PRESSURE: 126 MMHG | DIASTOLIC BLOOD PRESSURE: 78 MMHG | HEIGHT: 65 IN | WEIGHT: 160 LBS | HEART RATE: 62 BPM | BODY MASS INDEX: 26.66 KG/M2

## 2023-08-08 VITALS — BODY MASS INDEX: 26.81 KG/M2 | HEIGHT: 65 IN | WEIGHT: 160.94 LBS

## 2023-08-08 DIAGNOSIS — R61 DIAPHORESIS: ICD-10-CM

## 2023-08-08 DIAGNOSIS — R07.89 BURNING CHEST PAIN: ICD-10-CM

## 2023-08-08 DIAGNOSIS — I25.10 CORONARY ARTERY CALCIFICATION: ICD-10-CM

## 2023-08-08 DIAGNOSIS — R42 LIGHTHEADEDNESS: ICD-10-CM

## 2023-08-08 DIAGNOSIS — Q23.1 BICUSPID AORTIC VALVE: ICD-10-CM

## 2023-08-08 DIAGNOSIS — I25.84 CORONARY ARTERY CALCIFICATION: ICD-10-CM

## 2023-08-08 DIAGNOSIS — I44.0 FIRST DEGREE AV BLOCK: ICD-10-CM

## 2023-08-08 LAB
AORTIC ARCH: 2.8 CM
ASCENDING AORTA: 4 CM
BH CV ECHO MEAS - ACS: 2.5 CM
BH CV ECHO MEAS - AI P1/2T: 1201 MSEC
BH CV ECHO MEAS - AO MAX PG: 8.3 MMHG
BH CV ECHO MEAS - AO MEAN PG: 5 MMHG
BH CV ECHO MEAS - AO ROOT DIAM: 3.9 CM
BH CV ECHO MEAS - AO V2 MAX: 144 CM/SEC
BH CV ECHO MEAS - AO V2 VTI: 30.4 CM
BH CV ECHO MEAS - AVA(I,D): 2.32 CM2
BH CV ECHO MEAS - EDV(CUBED): 79.5 ML
BH CV ECHO MEAS - EDV(MOD-SP2): 65 ML
BH CV ECHO MEAS - EDV(MOD-SP4): 66 ML
BH CV ECHO MEAS - EF(MOD-BP): 61.4 %
BH CV ECHO MEAS - EF(MOD-SP2): 61.5 %
BH CV ECHO MEAS - EF(MOD-SP4): 63.6 %
BH CV ECHO MEAS - ESV(CUBED): 14.2 ML
BH CV ECHO MEAS - ESV(MOD-SP2): 25 ML
BH CV ECHO MEAS - ESV(MOD-SP4): 24 ML
BH CV ECHO MEAS - FS: 43.7 %
BH CV ECHO MEAS - IVS/LVPW: 1.09 CM
BH CV ECHO MEAS - IVSD: 1.2 CM
BH CV ECHO MEAS - LAT PEAK E' VEL: 5.5 CM/SEC
BH CV ECHO MEAS - LV DIASTOLIC VOL/BSA (35-75): 36.7 CM2
BH CV ECHO MEAS - LV MASS(C)D: 173.6 GRAMS
BH CV ECHO MEAS - LV MAX PG: 4.6 MMHG
BH CV ECHO MEAS - LV MEAN PG: 2 MMHG
BH CV ECHO MEAS - LV SYSTOLIC VOL/BSA (12-30): 13.3 CM2
BH CV ECHO MEAS - LV V1 MAX: 107 CM/SEC
BH CV ECHO MEAS - LV V1 VTI: 22.1 CM
BH CV ECHO MEAS - LVIDD: 4.3 CM
BH CV ECHO MEAS - LVIDS: 2.42 CM
BH CV ECHO MEAS - LVOT AREA: 3.2 CM2
BH CV ECHO MEAS - LVOT DIAM: 2.01 CM
BH CV ECHO MEAS - LVPWD: 1.1 CM
BH CV ECHO MEAS - MED PEAK E' VEL: 5.1 CM/SEC
BH CV ECHO MEAS - MR MAX PG: 47.2 MMHG
BH CV ECHO MEAS - MR MAX VEL: 343.4 CM/SEC
BH CV ECHO MEAS - MV A DUR: 0.13 SEC
BH CV ECHO MEAS - MV A MAX VEL: 53.1 CM/SEC
BH CV ECHO MEAS - MV DEC SLOPE: 146.9 CM/SEC2
BH CV ECHO MEAS - MV DEC TIME: 0.31 MSEC
BH CV ECHO MEAS - MV E MAX VEL: 36.8 CM/SEC
BH CV ECHO MEAS - MV E/A: 0.69
BH CV ECHO MEAS - MV MAX PG: 2.31 MMHG
BH CV ECHO MEAS - MV MEAN PG: 0.67 MMHG
BH CV ECHO MEAS - MV P1/2T: 97.5 MSEC
BH CV ECHO MEAS - MV V2 VTI: 22.9 CM
BH CV ECHO MEAS - MVA(P1/2T): 2.26 CM2
BH CV ECHO MEAS - MVA(VTI): 3.1 CM2
BH CV ECHO MEAS - PA ACC TIME: 0.08 SEC
BH CV ECHO MEAS - PA V2 MAX: 89.6 CM/SEC
BH CV ECHO MEAS - PULM A REVS DUR: 0.12 SEC
BH CV ECHO MEAS - PULM A REVS VEL: 28.7 CM/SEC
BH CV ECHO MEAS - PULM DIAS VEL: 28.3 CM/SEC
BH CV ECHO MEAS - PULM S/D: 1.85
BH CV ECHO MEAS - PULM SYS VEL: 52.3 CM/SEC
BH CV ECHO MEAS - QP/QS: 0.86
BH CV ECHO MEAS - RAP SYSTOLE: 3 MMHG
BH CV ECHO MEAS - RV MAX PG: 1.11 MMHG
BH CV ECHO MEAS - RV V1 MAX: 52.7 CM/SEC
BH CV ECHO MEAS - RV V1 VTI: 12.5 CM
BH CV ECHO MEAS - RVOT DIAM: 2.49 CM
BH CV ECHO MEAS - RVSP: 22.3 MMHG
BH CV ECHO MEAS - SI(MOD-SP2): 22.2 ML/M2
BH CV ECHO MEAS - SI(MOD-SP4): 23.3 ML/M2
BH CV ECHO MEAS - SUP REN AO DIAM: 2.4 CM
BH CV ECHO MEAS - SV(LVOT): 70.4 ML
BH CV ECHO MEAS - SV(MOD-SP2): 40 ML
BH CV ECHO MEAS - SV(MOD-SP4): 42 ML
BH CV ECHO MEAS - SV(RVOT): 60.8 ML
BH CV ECHO MEAS - TAPSE (>1.6): 2.6 CM
BH CV ECHO MEAS - TR MAX PG: 19.3 MMHG
BH CV ECHO MEAS - TR MAX VEL: 219.9 CM/SEC
BH CV ECHO MEASUREMENTS AVERAGE E/E' RATIO: 6.94
BH CV NUCLEAR PRIOR STUDY: 1
BH CV REST NUCLEAR ISOTOPE DOSE: 11.5 MCI
BH CV STRESS BP STAGE 1: NORMAL
BH CV STRESS COMMENTS STAGE 1: NORMAL
BH CV STRESS DOSE REGADENOSON STAGE 1: 0.4
BH CV STRESS DURATION MIN STAGE 1: 0
BH CV STRESS DURATION SEC STAGE 1: 10
BH CV STRESS HR STAGE 1: 95
BH CV STRESS NUCLEAR ISOTOPE DOSE: 34.6 MCI
BH CV STRESS PROTOCOL 1: NORMAL
BH CV STRESS RECOVERY BP: NORMAL MMHG
BH CV STRESS RECOVERY HR: 76 BPM
BH CV STRESS STAGE 1: 1
BH CV XLRA - RV BASE: 3.4 CM
BH CV XLRA - RV LENGTH: 6.8 CM
BH CV XLRA - RV MID: 2.9 CM
BH CV XLRA - TDI S': 14.6 CM/SEC
LEFT ATRIUM VOLUME INDEX: 24.6 ML/M2
LV EF NUC BP: 73 %
MAXIMAL PREDICTED HEART RATE: 148 BPM
PERCENT MAX PREDICTED HR: 64.19 %
SINUS: 3.6 CM
STJ: 3.1 CM
STRESS BASELINE BP: NORMAL MMHG
STRESS BASELINE HR: 78 BPM
STRESS PERCENT HR: 76 %
STRESS POST EXERCISE DUR SEC: 10 SEC
STRESS POST PEAK BP: NORMAL MMHG
STRESS POST PEAK HR: 95 BPM
STRESS TARGET HR: 126 BPM

## 2023-08-08 PROCEDURE — 78452 HT MUSCLE IMAGE SPECT MULT: CPT

## 2023-08-08 PROCEDURE — 78452 HT MUSCLE IMAGE SPECT MULT: CPT | Performed by: INTERNAL MEDICINE

## 2023-08-08 PROCEDURE — 0 TECHNETIUM TETROFOSMIN KIT: Performed by: NURSE PRACTITIONER

## 2023-08-08 PROCEDURE — A9502 TC99M TETROFOSMIN: HCPCS | Performed by: NURSE PRACTITIONER

## 2023-08-08 PROCEDURE — 93018 CV STRESS TEST I&R ONLY: CPT | Performed by: INTERNAL MEDICINE

## 2023-08-08 PROCEDURE — 25010000002 REGADENOSON 0.4 MG/5ML SOLUTION: Performed by: NURSE PRACTITIONER

## 2023-08-08 PROCEDURE — 93016 CV STRESS TEST SUPVJ ONLY: CPT | Performed by: INTERNAL MEDICINE

## 2023-08-08 PROCEDURE — 93306 TTE W/DOPPLER COMPLETE: CPT | Performed by: INTERNAL MEDICINE

## 2023-08-08 PROCEDURE — 93017 CV STRESS TEST TRACING ONLY: CPT

## 2023-08-08 PROCEDURE — 93306 TTE W/DOPPLER COMPLETE: CPT

## 2023-08-08 RX ORDER — REGADENOSON 0.08 MG/ML
0.4 INJECTION, SOLUTION INTRAVENOUS
Status: COMPLETED | OUTPATIENT
Start: 2023-08-08 | End: 2023-08-08

## 2023-08-08 RX ADMIN — TETROFOSMIN 1 DOSE: 1.38 INJECTION, POWDER, LYOPHILIZED, FOR SOLUTION INTRAVENOUS at 09:55

## 2023-08-08 RX ADMIN — TETROFOSMIN 1 DOSE: 1.38 INJECTION, POWDER, LYOPHILIZED, FOR SOLUTION INTRAVENOUS at 09:15

## 2023-08-08 RX ADMIN — REGADENOSON 0.4 MG: 0.08 INJECTION, SOLUTION INTRAVENOUS at 09:55

## 2023-08-08 NOTE — PROGRESS NOTES
She was recently having chest burning.  Stress test normal.  I will await the echocardiogram results. Standby progressed to Modified [facility]

## 2023-08-10 PROBLEM — I77.810 ASCENDING AORTA DILATION: Status: ACTIVE | Noted: 2023-08-10

## 2023-08-10 NOTE — PROGRESS NOTES
I reviewed the echocardiogram.  EF normal, mild LVH, grade 1 diastolic dysfunction, mild aortic valve calcification, and ascending aorta mildly dilated at 4.0 cm.  No wall motion abnormalities.  Stress test normal.    We discussed the results.  She says her chest burning is very intermittent.  She just wants to continue to monitor and if the chest burning worsen she will contact our office.  I explained the possible neck step may include a heart catheterization.  She verbalized understanding.

## 2023-09-17 DIAGNOSIS — I10 BENIGN ESSENTIAL HYPERTENSION: ICD-10-CM

## 2023-09-18 RX ORDER — OLMESARTAN MEDOXOMIL 40 MG/1
TABLET ORAL
Qty: 90 TABLET | Refills: 0 | Status: SHIPPED | OUTPATIENT
Start: 2023-09-18

## 2023-09-21 ENCOUNTER — TELEPHONE (OUTPATIENT)
Dept: INTERNAL MEDICINE | Facility: CLINIC | Age: 73
End: 2023-09-21

## 2023-09-21 NOTE — TELEPHONE ENCOUNTER
Caller: Jana Page    Relationship to patient: Self    Best call back number: [phone]    Type of visit: LABS    Requested date: BEFORE MWV ON 9/17    If rescheduling, when is the original appointment: 10/7    Additional notes: ATTEMPTED WARM TRANSFER, NO ANSWER. PATIENT RESCHEDULED MWV FOR 9/17, PLEASE CALL TO RESCHEDULE LABS.            20 year old male domiciled with family, PPS of psychosis, hx of multiple admissions since 2022, hx of SA 3 years ago, cannabis use disorder, no hx of violence, no PMHx, presented with worsening cannabis induced-psychosis and paranoia since 08/30.       Updates 09/22  Patient cooperative on examination with some delusions about mother still present   Stable for discharge      Suicide and risk assessment performed prior to discharge. The patient has a low acute risk and low chronic risk of self-harm and aggression towards others. Protective factors include denying SI, no SIB, denying HI, good social supports in their family, no substance abuse, no current mood symptoms, no hopelessness, future-oriented in returning to home, no access to firearms.  Risk factors include presenting illness. Immediate risk was minimized by inpatient admission to a safe environment with appropriate supervision and limited access to lethal means. Future risk was minimized before discharge by treatment of acute episode, maximizing outpatient support, providing relevant patient education, discussing emergency procedures, and ensuring close follow-up. The patient remains at a low risk of self-harm, and such risk cannot be further ameliorated by continued inpatient treatment and the patient is therefore appropriate for discharge.      Discharge Progress Note Date and Time: 09-22-23 @ 09:38

## 2023-09-21 NOTE — TELEPHONE ENCOUNTER
Left a detailed message for pt letting her know to quarantine for 5 days and if she continues to have symptoms after those 5 days do 5 more days    Hub okay to let her know or transfer to office

## 2023-09-21 NOTE — TELEPHONE ENCOUNTER
Caller: Jana Page    Relationship to patient: Self    Best call back number: [phone] (Mobile)     Date of exposure: EXPOSED AT [employer] LAST NIGHT 09/20/2023 WITH A FRIEND WHO HAS NOW TESTED POSITIVE FOR COVID TODAY 09/21/2023    Date of positive COVID19 test: PATIENT TESTED HERSELF AT HOME BUT STATED HER TESTS ARE INCONCLUSIVE SO FAR    Date if possible COVID19 exposure: SEE ABOVE    COVID19 symptoms: SYMPTOMS STARTED ON 09/19/2023 WITH CONGESTION THEN A RUNNY NOSE LAST NIGHT AND THIS MORNING BUT CANNOT BLOW ANYTHING OUT OF IT, CONGESTION AND COUGH TODAY AND COUGH STARTED 2 OR 3 DAYS AGO, PATIENT SAID UNKNOWN TO FEVER    Date of initial quarantine: PATIENT IS ASKING IF SHE SHOULD QUARANTINE AND IF SO, HOW MANY DAYS SHOULD SHE DO THIS    Additional information or concerns: PATIENT IS ASKING WHAT SHE SHOULD DO ABOUT THIS AND IF SOMETHING SHOULD BE CALLED INTO THE PHARMACY, PLEASE ADVISE PATIENT REGARDING QUARANTINE PERIOD AND HOW PATIENT SHOULD PROCEED ABOUT BEING AROUND OTHER PEOPLE AND WHAT OTC MEDS SHE CAN USE, PLEASE CALL PATIENT BACK REGARDING THIS ASAP    What is the patients preferred pharmacy: [facility] [id number] - [place], [place] - [address] AT [address] - [phone]  - [phone] FX

## 2023-09-26 ENCOUNTER — TELEPHONE (OUTPATIENT)
Dept: INTERNAL MEDICINE | Facility: CLINIC | Age: 73
End: 2023-09-26

## 2023-09-26 NOTE — TELEPHONE ENCOUNTER
Caller: Page Jana VICKERS    Relationship: Self    Best call back number: [phone]     What is the best time to reach you: ANYTIME    Who are you requesting to speak with (clinical staff, provider,  specific staff member): CLINICAL    What was the call regarding: PATIENT STATED SHE HAS SEEN ONLINE THAT YOU MAY TEST POSITIVE FOR COVID HOWEVER NOT BE CONTAGIOUS.    PATIENT STATED SHE HAS BEEN TESTING POSITIVE FOR COVID FOR ABOUT A WEEK, INCLUDING ON AN URGENT CARE RAPID TEST 09-.    PATIENT STATED SHE WAS TOLD BY THE URGENT CARE THAT BECAUSE SHE QUARANTINED OR 5 DAYS, SHE MAY GO OUT IN PUBLIC WITH A MASK    PATIENT IS REQUESTING TO KNOW IF SHE WOULD BE CONTAGIOUS TO OTHER PEOPLE OR NOT.    PATIENT IS ALSO REQUESTING TO KNOW HOW LONG SHE IS TO BE MASKED.    PLEASE CALL TO DISCUSS AND ADVISE.    Is it okay if the provider responds through [employer]: NO, PATIENT DOES NOT USE [employer]

## 2023-09-26 NOTE — TELEPHONE ENCOUNTER
Lvm for pt to call back     Hub okay to tell pt as long as after her 5 days she doesn't have any symptoms she can wear a mask and go out if she still has symptoms after her 5th day of quarantine she would need to quarantine for an additional 5 day

## 2023-09-26 NOTE — TELEPHONE ENCOUNTER
PATIENT STATES SHE IS RETURNING [employer]'S PHONE CALL.    HUB READ THE HUB TO READ MESSAGE.     PATIENT VERBALIZED UNDERSTANDING AND STATES SHE IS WANTING TO KNOW IF SHE SHOULD CONSIDER HER STUFFY NOSE AS PART OF A SYMPTOM OR NOT.    PATIENT STATES SHE IS NOT HAVING ANY OTHER SYMPTOMS , JUST THE STUFFY NOSE.     PATIENT IS REQUESTING A CALL BACK TO LET HER KNOW AND TO LEAVE A DETAILED MESSAGE IF SHE DOES NOT ANSWER .     CALL BACK NUMBER: [phone]

## 2023-09-27 NOTE — TELEPHONE ENCOUNTER
Left detailed message letting pt know to wear a mask if her nose is runny but any other symptoms she needs to stay quarantined

## 2023-10-15 DIAGNOSIS — E78.00 HYPERCHOLESTEROLEMIA: ICD-10-CM

## 2023-10-16 RX ORDER — ATORVASTATIN CALCIUM 20 MG/1
20 TABLET, FILM COATED ORAL DAILY
Qty: 90 TABLET | Refills: 0 | Status: SHIPPED | OUTPATIENT
Start: 2023-10-16

## 2023-10-23 ENCOUNTER — OFFICE VISIT (OUTPATIENT)
Dept: INTERNAL MEDICINE | Facility: CLINIC | Age: 73
End: 2023-10-23
Payer: MEDICARE

## 2023-10-23 VITALS
WEIGHT: 178 LBS | RESPIRATION RATE: 18 BRPM | BODY MASS INDEX: 28.61 KG/M2 | HEART RATE: 65 BPM | HEIGHT: 66 IN | DIASTOLIC BLOOD PRESSURE: 78 MMHG | SYSTOLIC BLOOD PRESSURE: 120 MMHG

## 2023-10-23 DIAGNOSIS — R73.03 PREDIABETES: Chronic | ICD-10-CM

## 2023-10-23 DIAGNOSIS — E78.2 MIXED HYPERLIPIDEMIA: Chronic | ICD-10-CM

## 2023-10-23 DIAGNOSIS — Z00.00 MEDICARE ANNUAL WELLNESS VISIT, SUBSEQUENT: Primary | ICD-10-CM

## 2023-10-23 DIAGNOSIS — F41.1 GENERALIZED ANXIETY DISORDER: Chronic | ICD-10-CM

## 2023-10-23 DIAGNOSIS — Z78.0 POSTMENOPAUSAL: ICD-10-CM

## 2023-10-23 DIAGNOSIS — I10 ESSENTIAL HYPERTENSION: Chronic | ICD-10-CM

## 2023-10-23 LAB
ALBUMIN SERPL-MCNC: 5.1 G/DL (ref 3.5–5.2)
ALBUMIN/GLOB SERPL: 2.8 G/DL
ALP SERPL-CCNC: 84 U/L (ref 39–117)
ALT SERPL-CCNC: 28 U/L (ref 1–33)
AST SERPL-CCNC: 19 U/L (ref 1–32)
BILIRUB SERPL-MCNC: 0.5 MG/DL (ref 0–1.2)
BUN SERPL-MCNC: 13 MG/DL (ref 8–23)
BUN/CREAT SERPL: 16.9 (ref 7–25)
CALCIUM SERPL-MCNC: 10.1 MG/DL (ref 8.6–10.5)
CHLORIDE SERPL-SCNC: 105 MMOL/L (ref 98–107)
CHOLEST SERPL-MCNC: 193 MG/DL (ref 0–200)
CO2 SERPL-SCNC: 27.1 MMOL/L (ref 22–29)
CREAT SERPL-MCNC: 0.77 MG/DL (ref 0.57–1)
EGFRCR SERPLBLD CKD-EPI 2021: 81.6 ML/MIN/1.73
ERYTHROCYTE [DISTWIDTH] IN BLOOD BY AUTOMATED COUNT: 12.7 % (ref 12.3–15.4)
GLOBULIN SER CALC-MCNC: 1.8 GM/DL
GLUCOSE SERPL-MCNC: 138 MG/DL (ref 65–99)
HBA1C MFR BLD: 5.9 % (ref 4.8–5.6)
HCT VFR BLD AUTO: 43.1 % (ref 34–46.6)
HDLC SERPL-MCNC: 58 MG/DL (ref 40–60)
HGB BLD-MCNC: 14.6 G/DL (ref 12–15.9)
LDLC SERPL CALC-MCNC: 105 MG/DL (ref 0–100)
LDLC/HDLC SERPL: 1.74 {RATIO}
MCH RBC QN AUTO: 30.7 PG (ref 26.6–33)
MCHC RBC AUTO-ENTMCNC: 33.9 G/DL (ref 31.5–35.7)
MCV RBC AUTO: 90.5 FL (ref 79–97)
PLATELET # BLD AUTO: 338 10*3/MM3 (ref 140–450)
POTASSIUM SERPL-SCNC: 4.2 MMOL/L (ref 3.5–5.2)
PROT SERPL-MCNC: 6.9 G/DL (ref 6–8.5)
RBC # BLD AUTO: 4.76 10*6/MM3 (ref 3.77–5.28)
SODIUM SERPL-SCNC: 143 MMOL/L (ref 136–145)
TRIGL SERPL-MCNC: 171 MG/DL (ref 0–150)
VLDLC SERPL CALC-MCNC: 30 MG/DL (ref 5–40)
WBC # BLD AUTO: 8.4 10*3/MM3 (ref 3.4–10.8)

## 2023-10-23 NOTE — PATIENT INSTRUCTIONS
"MyChart Tips:    MyChart is a useful part of our patient care program and is a great way for us to communicate lab results and for you to request refills.  Not all medical questions are appropriate for MyChart such as new medical issues that require taking a history, performing an exam, getting labs/studies or researching medical questions needed to be addressed in the office.    Examples of medical issues that are APPROPRIATE for MyChart:  -Follow-up on problems we have already addressed in a visit such as home testing results, blood pressure readings, glucose readings  -Questions that can be answered with a simple \"yes\" or \"no\"    Communication that is NOT APPROPRIATE for MyChart:  -MyChart is not for new problems, serious problems or urgent problems.  Urgent matters should be addressed by phone, in the office, urgent care or the ER.  -[employer] messages are not email.  Staff will check messages each weekday.  We strive for a 48-hour turnaround on messaging.    -[email or web address] is not for private issues.  Messages are received first by our office staff.    Please allow up to 7 business days for lab results to be sent through [employer] to you before contacting your provider.  Sometimes we are waiting for results to get back from the lab and also your provider needs time to analyze them thoroughly before making recommendations.  While the internet has great resources, it is not a substitute for interpreting lab results.    We also ask that you not send [employer] messages and telephone calls regarding the same issue simultaneously.  This slows down the process of returning your call/message and confuses staff.    Be mindful that [employer] messages and telephone calls become part of your permanent medical record.    Lastly, your provider cannot access your [employer].  It is a service which communicates with the EHR (Electronic Health Record).  Sometimes there are errors in [employer] that staff and providers cannot see and these errors " are not part of your EHR.  Vaccines and screening reminders have been incorrect in MyChart.    We appreciate your respect for the limitations and boundaries of [employer].

## 2023-10-23 NOTE — PROGRESS NOTES
The ABCs of the Annual Wellness Visit  Subsequent Medicare Wellness Visit    Subjective    Jana Page is a 73 y.o. female who presents for a Subsequent Medicare Wellness Visit.    The following portions of the patient's history were reviewed and   updated as appropriate: allergies, current medications, past family history, past medical history, past social history, past surgical history, and problem list.    Compared to one year ago, the patient feels her physical   health is the same.    Compared to one year ago, the patient feels her mental   health is the same.    Recent Hospitalizations:  She was not admitted to the hospital during the last year.       Current Medical Providers:  Patient Care Team:  Sabino Trinidad MD as PCP - General (Family Medicine)  Jd Mcmahon MD as Cardiologist (Cardiology)    Outpatient Medications Prior to Visit   Medication Sig Dispense Refill    Accu-Chek FastClix Lancets misc Use to check blood sugar once daily 100 each 2    amLODIPine (NORVASC) 5 MG tablet TAKE ONE TABLET BY MOUTH DAILY 90 tablet 3    aspirin 81 MG chewable tablet Chew 1 tablet Daily.      atorvastatin (LIPITOR) 20 MG tablet TAKE 1 TABLET BY MOUTH DAILY 90 tablet 0    Cholecalciferol (VITAMIN D) 1000 UNITS tablet Take  by mouth.      Coenzyme Q10 (CO Q 10) 10 MG capsule Take  by mouth Daily.      DULoxetine (CYMBALTA) 60 MG capsule TAKE ONE CAPSULE BY MOUTH DAILY 90 capsule 3    fluticasone (FLONASE) 50 MCG/ACT nasal spray 2 sprays into the nostril(s) as directed by provider Daily. Administer 2 sprays in each nostril for each dose.      Multiple Vitamin (MULTIVITAMIN) capsule Take 1 capsule by mouth Daily.      olmesartan (BENICAR) 40 MG tablet TAKE ONE TABLET BY MOUTH DAILY 90 tablet 0    Probiotic Product (PRO-BIOTIC BLEND) capsule Take  by mouth Daily.      propranolol (INDERAL) 60 MG tablet TAKE ONE TABLET BY MOUTH TWICE A  tablet 3     No facility-administered medications prior to visit.  "      No opioid medication identified on active medication list. I have reviewed chart for other potential  high risk medication/s and harmful drug interactions in the elderly.        Aspirin is on active medication list. Aspirin use is indicated based on review of current medical condition/s. Pros and cons of this therapy have been discussed today. Benefits of this medication outweigh potential harm.  Patient has been encouraged to continue taking this medication.  .      Patient Active Problem List   Diagnosis    Disc disorder of cervical region    Degeneration of intervertebral disc of lumbar region    Steatosis of liver    Fibrocystic breast changes    Osteoarthritis of knee    Osteopenia    Prediabetes    Scoliosis    Lipoma of left lower extremity    Family history of premature coronary artery disease    Generalized anxiety disorder    Arthralgia of metacarpophalangeal joint, right    First degree AV block    Essential hypertension    Fibroids    Other idiopathic scoliosis, lumbar region    Hot flash not due to menopause    Persistent headaches    Mixed hyperlipidemia    Bicuspid aortic valve    Cerebrovascular small vessel disease    Carotid artery plaque, bilateral    Coronary artery calcification    Ascending aorta dilation     Advance Care Planning   Advance Care Planning     Advance Directive is not on file.  ACP discussion was held with the patient during this visit. Patient has an advance directive (not in EMR), copy requested.     Objective    Vitals:    10/23/23 0929   BP: 120/78   BP Location: Left arm   Patient Position: Sitting   Cuff Size: Small Adult   Pulse: 65   Resp: 18   Weight: 80.7 kg (178 lb)   Height: 167.6 cm (66\")     Estimated body mass index is 28.73 kg/m² as calculated from the following:    Height as of this encounter: 167.6 cm (66\").    Weight as of this encounter: 80.7 kg (178 lb).           Does the patient have evidence of cognitive impairment? No          HEALTH RISK " ASSESSMENT    Smoking Status:  Social History     Tobacco Use   Smoking Status Former    Packs/day: 1.00    Years: 10.00    Additional pack years: 0.00    Total pack years: 10.00    Types: Cigarettes    Start date:     Quit date:     Years since quittin.8   Smokeless Tobacco Never     Alcohol Consumption:  Social History     Substance and Sexual Activity   Alcohol Use Yes    Comment: social     Fall Risk Screen:    SALTY Fall Risk Assessment was completed, and patient is at LOW risk for falls.Assessment completed on:10/23/2023    Depression Screening:      10/23/2023     9:31 AM   PHQ-2/PHQ-9 Depression Screening   Little Interest or Pleasure in Doing Things 0-->not at all   Feeling Down, Depressed or Hopeless 0-->not at all   PHQ-9: Brief Depression Severity Measure Score 0       Health Habits and Functional and Cognitive Screening:      10/23/2023     9:30 AM   Functional & Cognitive Status   Do you have difficulty preparing food and eating? No   Do you have difficulty bathing yourself, getting dressed or grooming yourself? No   Do you have difficulty using the toilet? No   Do you have difficulty moving around from place to place? No   Do you have trouble with steps or getting out of a bed or a chair? No   Current Diet Well Balanced Diet   Dental Exam Up to date   Eye Exam Up to date   Exercise (times per week) 0 times per week   Current Exercises Include No Regular Exercise   Do you need help using the phone?  No   Are you deaf or do you have serious difficulty hearing?  No   Do you need help to go to places out of walking distance? No   Do you need help shopping? No   Do you need help preparing meals?  No   Do you need help with housework?  No   Do you need help with laundry? No   Do you need help taking your medications? No   Do you need help managing money? No   Do you ever drive or ride in a car without wearing a seat belt? No   Have you felt unusual stress, anger or loneliness in the last  month? No   Who do you live with? Alone   If you need help, do you have trouble finding someone available to you? No   Have you been bothered in the last four weeks by sexual problems? No   Do you have difficulty concentrating, remembering or making decisions? No       Age-appropriate Screening Schedule:  Refer to the list below for future screening recommendations based on patient's age, sex and/or medical conditions. Orders for these recommended tests are listed in the plan section. The patient has been provided with a written plan.    Health Maintenance   Topic Date Due    COVID-19 Vaccine (5 - 2023-24 season) 09/01/2023    DXA SCAN  10/14/2023    ANNUAL WELLNESS VISIT  10/17/2023    MAMMOGRAM  01/17/2024    LIPID PANEL  04/19/2024    BMI FOLLOWUP  07/11/2024    TDAP/TD VACCINES (4 - Td or Tdap) 07/08/2029    COLORECTAL CANCER SCREENING  04/26/2032    HEPATITIS C SCREENING  Completed    INFLUENZA VACCINE  Completed    Pneumococcal Vaccine 65+  Completed    ZOSTER VACCINE  Addressed    DIABETIC FOOT EXAM  Discontinued    PAP SMEAR  Discontinued    HEMOGLOBIN A1C  Discontinued    DIABETIC EYE EXAM  Discontinued    URINE MICROALBUMIN  Discontinued                  CMS Preventative Services Quick Reference  Risk Factors Identified During Encounter  Immunizations Discussed/Encouraged: Influenza and COVID19  The above risks/problems have been discussed with the patient.  Pertinent information has been shared with the patient in the After Visit Summary.  An After Visit Summary and PPPS were made available to the patient.    Follow Up:   Next Medicare Wellness visit to be scheduled in 1 year.       Additional E&M Note during same encounter follows:  Patient has multiple medical problems which are significant and separately identifiable that require additional work above and beyond the Medicare Wellness Visit.      Chief Complaint  Medicare Wellness-subsequent    Subjective        HPI    She is following up today for HTN,  "HLD, prediabetes and anxiety.  She is doing well and tolerating her medication as prescribed.  Symptoms are controlled.  Trying to work on her diet to keep her numbers in control.      Current Outpatient Medications:     Accu-Chek FastClix Lancets misc, Use to check blood sugar once daily, Disp: 100 each, Rfl: 2    amLODIPine (NORVASC) 5 MG tablet, TAKE ONE TABLET BY MOUTH DAILY, Disp: 90 tablet, Rfl: 3    aspirin 81 MG chewable tablet, Chew 1 tablet Daily., Disp: , Rfl:     atorvastatin (LIPITOR) 20 MG tablet, TAKE 1 TABLET BY MOUTH DAILY, Disp: 90 tablet, Rfl: 0    Cholecalciferol (VITAMIN D) 1000 UNITS tablet, Take  by mouth., Disp: , Rfl:     Coenzyme Q10 (CO Q 10) 10 MG capsule, Take  by mouth Daily., Disp: , Rfl:     DULoxetine (CYMBALTA) 60 MG capsule, TAKE ONE CAPSULE BY MOUTH DAILY, Disp: 90 capsule, Rfl: 3    fluticasone (FLONASE) 50 MCG/ACT nasal spray, 2 sprays into the nostril(s) as directed by provider Daily. Administer 2 sprays in each nostril for each dose., Disp: , Rfl:     Multiple Vitamin (MULTIVITAMIN) capsule, Take 1 capsule by mouth Daily., Disp: , Rfl:     olmesartan (BENICAR) 40 MG tablet, TAKE ONE TABLET BY MOUTH DAILY, Disp: 90 tablet, Rfl: 0    Probiotic Product (PRO-BIOTIC BLEND) capsule, Take  by mouth Daily., Disp: , Rfl:     propranolol (INDERAL) 60 MG tablet, TAKE ONE TABLET BY MOUTH TWICE A DAY, Disp: 180 tablet, Rfl: 3           Objective   Vital Signs:  /78 (BP Location: Left arm, Patient Position: Sitting, Cuff Size: Small Adult)   Pulse 65   Resp 18   Ht 167.6 cm (66\")   Wt 80.7 kg (178 lb)   BMI 28.73 kg/m²     Physical Exam  Vitals and nursing note reviewed.   Constitutional:       General: She is not in acute distress.     Appearance: Normal appearance.   Cardiovascular:      Rate and Rhythm: Normal rate and regular rhythm.      Heart sounds: Normal heart sounds. No murmur heard.  Pulmonary:      Effort: Pulmonary effort is normal.      Breath sounds: Normal breath " sounds.   Neurological:      Mental Status: She is alert.   Psychiatric:         Mood and Affect: Mood and affect normal.          The following data was reviewed by: Sabino Trinidad MD on 10/23/2023:  Common labs          12/7/2022    08:59 4/19/2023    09:37 7/7/2023    14:57   Common Labs   Glucose  134  119    BUN  12  12    Creatinine  0.78  0.83    Sodium  141  143    Potassium  4.4  3.6    Chloride  104  107    Calcium  10.2  10.1    Total Protein  7.0     Albumin  4.9  4.8    Total Bilirubin  0.7  0.8    Alkaline Phosphatase  96  90    AST (SGOT)  18  16    ALT (SGPT)  25  22    WBC  12.60  8.21    Hemoglobin  15.2  14.7    Hematocrit  44.6  42.6    Platelets  330  305    Total Cholesterol 174  175     Triglycerides 176  145     HDL Cholesterol 58  62     LDL Cholesterol  86  88     Hemoglobin A1C  5.90                  Assessment and Plan   Diagnoses and all orders for this visit:    1. Medicare annual wellness visit, subsequent (Primary)    2. Essential hypertension  -     CBC (No Diff)  -     Comprehensive Metabolic Panel    3. Mixed hyperlipidemia  -     CBC (No Diff)  -     Comprehensive Metabolic Panel  -     Lipid Panel With LDL / HDL Ratio    4. Prediabetes  -     CBC (No Diff)  -     Comprehensive Metabolic Panel  -     Hemoglobin A1c    5. Generalized anxiety disorder    6. Postmenopausal  -     DEXA Bone Density Axial; Future      Clinically stable chronic conditions as above.  Continue all medications as above.  Labs reviewed/ordered as above.           Follow Up   Return in about 6 months (around 4/23/2024) for Next scheduled follow up.  Patient was given instructions and counseling regarding her condition or for health maintenance advice. Please see specific information pulled into the AVS if appropriate.

## 2023-10-26 ENCOUNTER — TELEPHONE (OUTPATIENT)
Dept: INTERNAL MEDICINE | Facility: CLINIC | Age: 73
End: 2023-10-26

## 2023-10-26 NOTE — TELEPHONE ENCOUNTER
Caller: Jana Page    Relationship: Self    Best call back number: [phone]  OK TO LEAVE MESSAGE    What test was performed: LABS    When was the test performed: 10/23    Where was the test performed: IN OFFICE    Additional notes: PATIENT DOES NOT USE MYCHART

## 2023-10-26 NOTE — TELEPHONE ENCOUNTER
Hub staff attempted to follow warm transfer process and was unsuccessful     Caller: Jana Page    Relationship to patient: Self    Best call back number: [phone]    Patient is needing: THE PATIENT RETURNED A CALL REGARDING LAB RESULTS. PLEASE ADVISE.

## 2023-11-27 ENCOUNTER — HOSPITAL ENCOUNTER (OUTPATIENT)
Facility: HOSPITAL | Age: 73
Discharge: HOME | End: 2023-11-27
Admitting: FAMILY MEDICINE
Payer: MEDICARE

## 2023-11-27 DIAGNOSIS — Z78.0 POSTMENOPAUSAL: ICD-10-CM

## 2023-11-27 PROCEDURE — 77080 DXA BONE DENSITY AXIAL: CPT

## 2023-11-30 ENCOUNTER — TELEPHONE (OUTPATIENT)
Dept: INTERNAL MEDICINE | Facility: CLINIC | Age: 73
End: 2023-11-30

## 2023-11-30 NOTE — TELEPHONE ENCOUNTER
Caller: Jana Page    Relationship: Self    Best call back number: [phone]     Caller requesting test results:      What test was performed: BONE DENSITY     When was the test performed: 11/27/23    Where was the test performed:      Additional notes:  PATIENT CALLED AND WANTS TO KNOW THE RESULTS OF THE BONE DENSITY SCAN DONE ON MONDAY 11/27/23 PLEASE CALL HER BACK.

## 2023-12-04 NOTE — PROGRESS NOTES
RM:________     PCP: Sabino Trinidad MD    : 1950  AGE: 73 y.o.  EST PATIENT     REASON FOR VISIT/  CC:        BP Readings from Last 3 Encounters:   10/23/23 120/78   23 126/88   23 126/78      Wt Readings from Last 3 Encounters:   10/23/23 80.7 kg (178 lb)   23 72.6 kg (160 lb)   23 73 kg (160 lb 15 oz)        WT: ____________ BP: __________L __________R HR______    CHEST PAIN: _____________    SOA: _____________PALPS: _______________     LIGHTHEADED: ___________FATIGUE: ________________ EDEMA __________    ALLERGIES:Amoxicillin-pot clavulanate and Ampicillin SMOKING HISTORY:  Social History     Tobacco Use    Smoking status: Former     Packs/day: 1.00     Years: 10.00     Additional pack years: 0.00     Total pack years: 10.00     Types: Cigarettes     Start date:      Quit date:      Years since quittin.9    Smokeless tobacco: Never   Vaping Use    Vaping Use: Never used   Substance Use Topics    Alcohol use: Yes     Comment: social    Drug use: Defer     CAFFEINE USE_________________  ALCOHOL ______________________

## 2023-12-13 ENCOUNTER — OFFICE VISIT (OUTPATIENT)
Dept: CARDIOLOGY | Facility: CLINIC | Age: 73
End: 2023-12-13
Payer: MEDICARE

## 2023-12-13 VITALS
BODY MASS INDEX: 28.8 KG/M2 | SYSTOLIC BLOOD PRESSURE: 128 MMHG | WEIGHT: 179.2 LBS | HEIGHT: 66 IN | HEART RATE: 58 BPM | DIASTOLIC BLOOD PRESSURE: 74 MMHG

## 2023-12-13 DIAGNOSIS — Z82.49 FAMILY HISTORY OF PREMATURE CORONARY ARTERY DISEASE: ICD-10-CM

## 2023-12-13 DIAGNOSIS — I65.23 CAROTID ARTERY PLAQUE, BILATERAL: ICD-10-CM

## 2023-12-13 DIAGNOSIS — E78.2 MIXED HYPERLIPIDEMIA: Chronic | ICD-10-CM

## 2023-12-13 DIAGNOSIS — Q23.1 BICUSPID AORTIC VALVE: ICD-10-CM

## 2023-12-13 DIAGNOSIS — I25.84 CORONARY ARTERY CALCIFICATION: Primary | ICD-10-CM

## 2023-12-13 DIAGNOSIS — I77.810 ASCENDING AORTA DILATION: ICD-10-CM

## 2023-12-13 DIAGNOSIS — I25.10 CORONARY ARTERY CALCIFICATION: Primary | ICD-10-CM

## 2023-12-13 DIAGNOSIS — R60.0 PEDAL EDEMA: ICD-10-CM

## 2023-12-13 DIAGNOSIS — I10 ESSENTIAL HYPERTENSION: Chronic | ICD-10-CM

## 2023-12-13 PROCEDURE — 93000 ELECTROCARDIOGRAM COMPLETE: CPT | Performed by: INTERNAL MEDICINE

## 2023-12-13 PROCEDURE — 3074F SYST BP LT 130 MM HG: CPT | Performed by: INTERNAL MEDICINE

## 2023-12-13 PROCEDURE — 99214 OFFICE O/P EST MOD 30 MIN: CPT | Performed by: INTERNAL MEDICINE

## 2023-12-13 PROCEDURE — 1159F MED LIST DOCD IN RCRD: CPT | Performed by: INTERNAL MEDICINE

## 2023-12-13 PROCEDURE — 1160F RVW MEDS BY RX/DR IN RCRD: CPT | Performed by: INTERNAL MEDICINE

## 2023-12-13 PROCEDURE — 3078F DIAST BP <80 MM HG: CPT | Performed by: INTERNAL MEDICINE

## 2023-12-13 RX ORDER — FUROSEMIDE 20 MG/1
20 TABLET ORAL DAILY PRN
Qty: 60 TABLET | Refills: 1 | Status: SHIPPED | OUTPATIENT
Start: 2023-12-13

## 2023-12-13 NOTE — PROGRESS NOTES
Date of Office Visit: 23  Encounter Provider: Jd Mcmahon MD  Place of Service: [facility] CARDIOLOGY  Patient Name: Jana Page  :1950    Chief Complaint   Patient presents with    Agatston CAC score, >400   :   HPI:     Ms. Page is 73 y.o. and presents today to follow up. I have reviewed prior notes and there are no changes except for any new updates described below. I have also reviewed any information entered into the medical record by the patient or by ancillary staff.     She has a history of hypertension and hyperlipidemia. Her father had premature CAD. She is a former smoker having quit in . She was seen in our office in  for an abnormal calcium score; it was 659 (LM 0, , Cx 138, ).  An echo and perfusion stress were performed and were normal. She has been maintained on aspirin and atorvastatin.     She presented in 2021 with intermittent dizziness; it did not sound cardiac in etiology. An evaluation was performed that consisted of a normal Holter and perfusion stress test. An echo was normal except for a suggested bicuspid aortic valve with normal function. A CTA of the chest revealed normal aortic caliber (root 3.8cm, ascending 3.7cm).     In 2023, she reported shortness of breath.  A perfusion stress test was normal.  An echo revealed ejection fraction of 60%, normal diastolic function for age, normal RVSP, and a 4 cm ascending aorta.    She has stable exertional dyspnea.  She occasionally notices lower extremity edema if she eats salty food or if she is on her feet in the heat.  She is not having any chest discomfort, palpitations, lightheadedness, or syncope.    Past Medical History:   Diagnosis Date    Anxiety     Aortic atherosclerosis     Bicuspid aortic valve 2022    Carotid artery plaque, bilateral 2023    DDD (degenerative disc disease), cervical     Diverticulitis of large intestine      Diverticulosis of intestine     Fibrocystic breast changes     Hyperlipidemia     Hypertension     Impaired fasting blood sugar     Lateral epicondylitis of right elbow     Lightheadedness 02/10/2017    Lipoma of left lower extremity     New daily persistent headache 2017    Osteoarthritis of knee     Osteopenia     Pancreatic cyst     NO CHANGE IN  FROM     Post-menopausal     Scoliosis     Steatosis of liver     Uterine leiomyoma        Past Surgical History:   Procedure Laterality Date    COLONOSCOPY  2011        COLONOSCOPY N/A 2022    Procedure: COLONOSCOPY TO CECUM AND TERMINAL ILEUM;  Surgeon: Jay Contreras MD;  Location: [facility] ENDOSCOPY;  Service: Gastroenterology;  Laterality: N/A;  SCREENING  DIVERTICULOSIS, INTERNAL HEMORRHOIDS       Social History     Socioeconomic History    Marital status:     Number of children: 2   Tobacco Use    Smoking status: Former     Packs/day: 1.00     Years: 10.00     Additional pack years: 0.00     Total pack years: 10.00     Types: Cigarettes     Start date:      Quit date:      Years since quittin.9    Smokeless tobacco: Never   Vaping Use    Vaping Use: Never used   Substance and Sexual Activity    Alcohol use: Yes     Comment: social    Drug use: Defer    Sexual activity: Defer       Family History   Problem Relation Age of Onset    Hypertension Mother     Osteoarthritis Mother     Breast cancer Mother         in situ    Stroke Mother     Diabetes Mother     Atrial fibrillation Mother     Heart attack Father 38    Coronary artery disease Father         CABG    Colon polyps Sister        Review of Systems   Constitutional: Positive for malaise/fatigue.   All other systems reviewed and are negative.      Allergies   Allergen Reactions    Amoxicillin-Pot Clavulanate Diarrhea    Ampicillin GI Intolerance         Current Outpatient Medications:     Accu-Chek FastClix Lancets misc, Use to check blood sugar once daily, Disp:  "100 each, Rfl: 2    amLODIPine (NORVASC) 5 MG tablet, TAKE ONE TABLET BY MOUTH DAILY, Disp: 90 tablet, Rfl: 3    aspirin 81 MG chewable tablet, Chew 1 tablet Daily., Disp: , Rfl:     atorvastatin (LIPITOR) 20 MG tablet, TAKE 1 TABLET BY MOUTH DAILY, Disp: 90 tablet, Rfl: 0    Cholecalciferol (VITAMIN D) 1000 UNITS tablet, Take  by mouth., Disp: , Rfl:     Coenzyme Q10 (CO Q 10) 10 MG capsule, Take  by mouth Daily., Disp: , Rfl:     DULoxetine (CYMBALTA) 60 MG capsule, TAKE ONE CAPSULE BY MOUTH DAILY, Disp: 90 capsule, Rfl: 3    fluticasone (FLONASE) 50 MCG/ACT nasal spray, 2 sprays into the nostril(s) as directed by provider Daily. Administer 2 sprays in each nostril for each dose., Disp: , Rfl:     Multiple Vitamin (MULTIVITAMIN) capsule, Take 1 capsule by mouth Daily., Disp: , Rfl:     olmesartan (BENICAR) 40 MG tablet, TAKE ONE TABLET BY MOUTH DAILY, Disp: 90 tablet, Rfl: 0    Probiotic Product (PRO-BIOTIC BLEND) capsule, Take  by mouth Daily., Disp: , Rfl:     propranolol (INDERAL) 60 MG tablet, TAKE ONE TABLET BY MOUTH TWICE A DAY, Disp: 180 tablet, Rfl: 3    RSVPreF3 Vac Recomb Adjuvanted (Arexvy) 120 MCG/0.5ML reconstituted suspension injection, Inject  into the appropriate muscle as directed by prescriber., Disp: 1 each, Rfl: 0      Objective:     Vitals:    12/13/23 1421   BP: 128/74   BP Location: Right arm   Pulse: 58   Weight: 81.3 kg (179 lb 3.2 oz)   Height: 167.6 cm (66\")       Body mass index is 28.92 kg/m².    Vitals reviewed.   Constitutional:       Appearance: Well-developed and not in distress.   Eyes:      Conjunctiva/sclera: Conjunctivae normal.   HENT:      Head: Normocephalic.      Nose: Nose normal.   Neck:      Vascular: No JVD. JVD normal.      Lymphadenopathy: No cervical adenopathy.   Pulmonary:      Effort: Pulmonary effort is normal.      Breath sounds: Normal breath sounds.   Cardiovascular:      Normal rate. Regular rhythm.      Murmurs: There is no murmur.   Pulses:     Intact " distal pulses.   Edema:     Peripheral edema absent.   Abdominal:      Palpations: Abdomen is soft.      Tenderness: There is no abdominal tenderness.   Musculoskeletal: Normal range of motion.      Cervical back: Normal range of motion. Skin:     General: Skin is warm and dry.      Findings: No rash.   Neurological:      General: No focal deficit present.      Mental Status: Alert, oriented to person, place, and time and oriented to person, place and time.      Cranial Nerves: No cranial nerve deficit.   Psychiatric:         Behavior: Behavior normal.         Thought Content: Thought content normal.         Judgment: Judgment normal.         ECG 12 Lead    Date/Time: 12/13/2023 3:09 PM  Performed by: Jd Mcmahon MD    Authorized by: Jd Mcmahon MD  Comparison: compared with previous ECG   Similar to previous ECG  Rhythm: sinus rhythm  Conduction: 1st degree AV block  ST Segments: ST segments normal  T Waves: T waves normal  QRS axis: normal  Other: no other findings    Clinical impression: non-specific ECG            Assessment:       Diagnosis Plan   1. Coronary artery calcification        2. Family history of premature coronary artery disease        3. Mixed hyperlipidemia        4. Carotid artery plaque, bilateral        5. Bicuspid aortic valve        6. Ascending aorta dilation  Adult Transthoracic Echo Complete W/ Cont if Necessary Per Protocol      7. Essential hypertension        8. Pedal edema  Adult Transthoracic Echo Complete W/ Cont if Necessary Per Protocol           Plan:     1/2/3/4. In 2017, she was noted to have a significantly elevated calcium score of 659.  She denies angina. She had a normal perfusion stress tests in 2021 and 2023.  She has mild exertional dyspnea that I do not believe is anginal.  She's on atorvastatin and low-dose aspirin.     5/6.  She has a suspected bicuspid aortic valve although we have not been able to visualize it well.  She had an echo in 2023 and we will repeat a  study in 1 year.  This is predominantly to follow her aortic dilation.  It measured 4 cm.    7/8.  Her blood pressure is well-controlled.  She has occasional leg swelling which is likely made worse by amlodipine.  However, she says it is infrequent and is not terribly bothersome so she is reticent to make any changes to her antihypertensive regimen.  I did give her some low-dose furosemide to use on an as-needed basis, sparingly, if she really does develop significant leg swelling.  An echo showed no evidence of elevated filling pressures or pulmonary hypertension.        Sincerely,       Jd Mcmahon MD

## 2023-12-14 DIAGNOSIS — I10 BENIGN ESSENTIAL HYPERTENSION: ICD-10-CM

## 2023-12-14 RX ORDER — OLMESARTAN MEDOXOMIL 40 MG/1
40 TABLET ORAL DAILY
Qty: 90 TABLET | Refills: 0 | Status: SHIPPED | OUTPATIENT
Start: 2023-12-14

## 2023-12-21 DIAGNOSIS — I10 BENIGN ESSENTIAL HYPERTENSION: ICD-10-CM

## 2023-12-21 RX ORDER — PROPRANOLOL HYDROCHLORIDE 60 MG/1
TABLET ORAL
Qty: 180 TABLET | Refills: 3 | Status: SHIPPED | OUTPATIENT
Start: 2023-12-21

## 2024-01-01 ENCOUNTER — HOSPITAL ENCOUNTER (EMERGENCY)
Facility: HOSPITAL | Age: 74
Discharge: HOME | End: 2024-01-01
Attending: EMERGENCY MEDICINE | Admitting: EMERGENCY MEDICINE
Payer: MEDICARE

## 2024-01-01 ENCOUNTER — APPOINTMENT (OUTPATIENT)
Dept: CT IMAGING | Facility: HOSPITAL | Age: 74
End: 2024-01-01
Payer: MEDICARE

## 2024-01-01 VITALS
OXYGEN SATURATION: 94 % | HEART RATE: 76 BPM | SYSTOLIC BLOOD PRESSURE: 141 MMHG | RESPIRATION RATE: 18 BRPM | DIASTOLIC BLOOD PRESSURE: 87 MMHG | HEIGHT: 66 IN | WEIGHT: 160 LBS | BODY MASS INDEX: 25.71 KG/M2 | TEMPERATURE: 99.1 F

## 2024-01-01 DIAGNOSIS — E87.6 HYPOKALEMIA: ICD-10-CM

## 2024-01-01 DIAGNOSIS — B34.9 VIRAL SYNDROME: ICD-10-CM

## 2024-01-01 DIAGNOSIS — R10.84 GENERALIZED ABDOMINAL PAIN: Primary | ICD-10-CM

## 2024-01-01 LAB
ALBUMIN SERPL-MCNC: 4.7 G/DL (ref 3.5–5.2)
ALBUMIN/GLOB SERPL: 2.4 G/DL
ALP SERPL-CCNC: 85 U/L (ref 39–117)
ALT SERPL W P-5'-P-CCNC: 25 U/L (ref 1–33)
ANION GAP SERPL CALCULATED.3IONS-SCNC: 13 MMOL/L (ref 5–15)
AST SERPL-CCNC: 21 U/L (ref 1–32)
B PARAPERT DNA SPEC QL NAA+PROBE: NOT DETECTED
B PERT DNA SPEC QL NAA+PROBE: NOT DETECTED
BASOPHILS # BLD AUTO: 0.02 10*3/MM3 (ref 0–0.2)
BASOPHILS NFR BLD AUTO: 0.2 % (ref 0–1.5)
BILIRUB SERPL-MCNC: 0.4 MG/DL (ref 0–1.2)
BILIRUB UR QL STRIP: NEGATIVE
BUN SERPL-MCNC: 13 MG/DL (ref 8–23)
BUN/CREAT SERPL: 15.5 (ref 7–25)
C PNEUM DNA NPH QL NAA+NON-PROBE: NOT DETECTED
CALCIUM SPEC-SCNC: 9.4 MG/DL (ref 8.6–10.5)
CHLORIDE SERPL-SCNC: 104 MMOL/L (ref 98–107)
CLARITY UR: CLEAR
CO2 SERPL-SCNC: 24 MMOL/L (ref 22–29)
COLOR UR: YELLOW
CREAT SERPL-MCNC: 0.84 MG/DL (ref 0.57–1)
DEPRECATED RDW RBC AUTO: 41.1 FL (ref 37–54)
EGFRCR SERPLBLD CKD-EPI 2021: 73.5 ML/MIN/1.73
EOSINOPHIL # BLD AUTO: 0.05 10*3/MM3 (ref 0–0.4)
EOSINOPHIL NFR BLD AUTO: 0.4 % (ref 0.3–6.2)
ERYTHROCYTE [DISTWIDTH] IN BLOOD BY AUTOMATED COUNT: 12.4 % (ref 12.3–15.4)
FLUAV SUBTYP SPEC NAA+PROBE: NOT DETECTED
FLUBV RNA ISLT QL NAA+PROBE: NOT DETECTED
GLOBULIN UR ELPH-MCNC: 2 GM/DL
GLUCOSE SERPL-MCNC: 144 MG/DL (ref 65–99)
GLUCOSE UR STRIP-MCNC: NEGATIVE MG/DL
HADV DNA SPEC NAA+PROBE: NOT DETECTED
HCOV 229E RNA SPEC QL NAA+PROBE: NOT DETECTED
HCOV HKU1 RNA SPEC QL NAA+PROBE: NOT DETECTED
HCOV NL63 RNA SPEC QL NAA+PROBE: NOT DETECTED
HCOV OC43 RNA SPEC QL NAA+PROBE: NOT DETECTED
HCT VFR BLD AUTO: 43.1 % (ref 34–46.6)
HGB BLD-MCNC: 14.6 G/DL (ref 12–15.9)
HGB UR QL STRIP.AUTO: NEGATIVE
HMPV RNA NPH QL NAA+NON-PROBE: NOT DETECTED
HPIV1 RNA ISLT QL NAA+PROBE: NOT DETECTED
HPIV2 RNA SPEC QL NAA+PROBE: NOT DETECTED
HPIV3 RNA NPH QL NAA+PROBE: NOT DETECTED
HPIV4 P GENE NPH QL NAA+PROBE: NOT DETECTED
IMM GRANULOCYTES # BLD AUTO: 0.03 10*3/MM3 (ref 0–0.05)
IMM GRANULOCYTES NFR BLD AUTO: 0.3 % (ref 0–0.5)
KETONES UR QL STRIP: NEGATIVE
LEUKOCYTE ESTERASE UR QL STRIP.AUTO: NEGATIVE
LIPASE SERPL-CCNC: 14 U/L (ref 13–60)
LYMPHOCYTES # BLD AUTO: 0.25 10*3/MM3 (ref 0.7–3.1)
LYMPHOCYTES NFR BLD AUTO: 2.1 % (ref 19.6–45.3)
M PNEUMO IGG SER IA-ACNC: NOT DETECTED
MCH RBC QN AUTO: 30.4 PG (ref 26.6–33)
MCHC RBC AUTO-ENTMCNC: 33.9 G/DL (ref 31.5–35.7)
MCV RBC AUTO: 89.8 FL (ref 79–97)
MONOCYTES # BLD AUTO: 0.26 10*3/MM3 (ref 0.1–0.9)
MONOCYTES NFR BLD AUTO: 2.2 % (ref 5–12)
NEUTROPHILS NFR BLD AUTO: 11.03 10*3/MM3 (ref 1.7–7)
NEUTROPHILS NFR BLD AUTO: 94.8 % (ref 42.7–76)
NITRITE UR QL STRIP: NEGATIVE
NRBC BLD AUTO-RTO: 0 /100 WBC (ref 0–0.2)
PH UR STRIP.AUTO: 6.5 [PH] (ref 5–8)
PLATELET # BLD AUTO: 242 10*3/MM3 (ref 140–450)
PMV BLD AUTO: 9.6 FL (ref 6–12)
POTASSIUM SERPL-SCNC: 3.2 MMOL/L (ref 3.5–5.2)
PROT SERPL-MCNC: 6.7 G/DL (ref 6–8.5)
PROT UR QL STRIP: NEGATIVE
RBC # BLD AUTO: 4.8 10*6/MM3 (ref 3.77–5.28)
RHINOVIRUS RNA SPEC NAA+PROBE: NOT DETECTED
RSV RNA NPH QL NAA+NON-PROBE: NOT DETECTED
SARS-COV-2 RNA NPH QL NAA+NON-PROBE: NOT DETECTED
SODIUM SERPL-SCNC: 141 MMOL/L (ref 136–145)
SP GR UR STRIP: 1.01 (ref 1–1.03)
UROBILINOGEN UR QL STRIP: NORMAL
WBC NRBC COR # BLD AUTO: 11.64 10*3/MM3 (ref 3.4–10.8)

## 2024-01-01 PROCEDURE — 80053 COMPREHEN METABOLIC PANEL: CPT | Performed by: PHYSICIAN ASSISTANT

## 2024-01-01 PROCEDURE — 25010000002 ONDANSETRON PER 1 MG: Performed by: PHYSICIAN ASSISTANT

## 2024-01-01 PROCEDURE — 74176 CT ABD & PELVIS W/O CONTRAST: CPT

## 2024-01-01 PROCEDURE — 96375 TX/PRO/DX INJ NEW DRUG ADDON: CPT

## 2024-01-01 PROCEDURE — 25810000003 LACTATED RINGERS SOLUTION: Performed by: PHYSICIAN ASSISTANT

## 2024-01-01 PROCEDURE — 0202U NFCT DS 22 TRGT SARS-COV-2: CPT | Performed by: PHYSICIAN ASSISTANT

## 2024-01-01 PROCEDURE — 81003 URINALYSIS AUTO W/O SCOPE: CPT | Performed by: PHYSICIAN ASSISTANT

## 2024-01-01 PROCEDURE — 25010000002 KETOROLAC TROMETHAMINE PER 15 MG: Performed by: PHYSICIAN ASSISTANT

## 2024-01-01 PROCEDURE — 99284 EMERGENCY DEPT VISIT MOD MDM: CPT

## 2024-01-01 PROCEDURE — 83690 ASSAY OF LIPASE: CPT | Performed by: PHYSICIAN ASSISTANT

## 2024-01-01 PROCEDURE — 85025 COMPLETE CBC W/AUTO DIFF WBC: CPT | Performed by: PHYSICIAN ASSISTANT

## 2024-01-01 PROCEDURE — 96374 THER/PROPH/DIAG INJ IV PUSH: CPT

## 2024-01-01 RX ORDER — FAMOTIDINE 20 MG/1
20 TABLET, FILM COATED ORAL 2 TIMES DAILY PRN
Qty: 15 TABLET | Refills: 0 | Status: SHIPPED | OUTPATIENT
Start: 2024-01-01

## 2024-01-01 RX ORDER — ONDANSETRON 4 MG/1
4 TABLET, ORALLY DISINTEGRATING ORAL EVERY 8 HOURS PRN
Qty: 15 TABLET | Refills: 0 | Status: SHIPPED | OUTPATIENT
Start: 2024-01-01

## 2024-01-01 RX ORDER — ONDANSETRON 2 MG/ML
4 INJECTION INTRAMUSCULAR; INTRAVENOUS ONCE
Status: COMPLETED | OUTPATIENT
Start: 2024-01-01 | End: 2024-01-01

## 2024-01-01 RX ORDER — POTASSIUM CHLORIDE 750 MG/1
40 TABLET, FILM COATED, EXTENDED RELEASE ORAL ONCE
Status: COMPLETED | OUTPATIENT
Start: 2024-01-01 | End: 2024-01-01

## 2024-01-01 RX ORDER — FAMOTIDINE 10 MG/ML
20 INJECTION, SOLUTION INTRAVENOUS ONCE
Status: COMPLETED | OUTPATIENT
Start: 2024-01-01 | End: 2024-01-01

## 2024-01-01 RX ORDER — SODIUM CHLORIDE 0.9 % (FLUSH) 0.9 %
10 SYRINGE (ML) INJECTION AS NEEDED
Status: DISCONTINUED | OUTPATIENT
Start: 2024-01-01 | End: 2024-01-01 | Stop reason: HOSPADM

## 2024-01-01 RX ORDER — DICYCLOMINE HYDROCHLORIDE 10 MG/1
20 CAPSULE ORAL ONCE
Status: COMPLETED | OUTPATIENT
Start: 2024-01-01 | End: 2024-01-01

## 2024-01-01 RX ORDER — DICYCLOMINE HYDROCHLORIDE 10 MG/1
10 CAPSULE ORAL 3 TIMES DAILY PRN
Qty: 15 CAPSULE | Refills: 0 | Status: SHIPPED | OUTPATIENT
Start: 2024-01-01

## 2024-01-01 RX ORDER — KETOROLAC TROMETHAMINE 15 MG/ML
15 INJECTION, SOLUTION INTRAMUSCULAR; INTRAVENOUS ONCE
Status: COMPLETED | OUTPATIENT
Start: 2024-01-01 | End: 2024-01-01

## 2024-01-01 RX ADMIN — DICYCLOMINE HYDROCHLORIDE 20 MG: 10 CAPSULE ORAL at 15:36

## 2024-01-01 RX ADMIN — FAMOTIDINE 20 MG: 10 INJECTION INTRAVENOUS at 15:36

## 2024-01-01 RX ADMIN — POTASSIUM CHLORIDE 40 MEQ: 750 TABLET, EXTENDED RELEASE ORAL at 15:36

## 2024-01-01 RX ADMIN — KETOROLAC TROMETHAMINE 15 MG: 15 INJECTION, SOLUTION INTRAMUSCULAR; INTRAVENOUS at 12:01

## 2024-01-01 RX ADMIN — ONDANSETRON HYDROCHLORIDE 4 MG: 2 INJECTION, SOLUTION INTRAMUSCULAR; INTRAVENOUS at 12:01

## 2024-01-01 RX ADMIN — SODIUM CHLORIDE, POTASSIUM CHLORIDE, SODIUM LACTATE AND CALCIUM CHLORIDE 1000 ML: 600; 310; 30; 20 INJECTION, SOLUTION INTRAVENOUS at 11:59

## 2024-01-01 NOTE — DISCHARGE INSTRUCTIONS
Follow-up with primary care provider.  Use Tylenol to help with headache.  Take Pepcid as needed to help with abdominal pain.  Use dicyclomine as needed to help with diarrhea.  Use Zofran as needed to help with nausea and vomiting.  Return to emergency department for any worsening symptoms.

## 2024-01-01 NOTE — ED PROVIDER NOTES
EMERGENCY DEPARTMENT ENCOUNTER    Room Number:  32/32  PCP: Sabino Trinidad MD        HPI:  Chief Complaint: Flulike symptoms    Context: Jana Page is a 73 y.o. female who presents to the ED c/o flulike symptoms that started yesterday.  Patient reports she had headache yesterday afternoon and just did not feel well.  She woke in the middle of the night with vomiting and diarrhea.  Reports she now has generalized bodyaches and some diffuse burning abdominal pain.  She does have history of diverticulitis and states this feels similar.  She denies any known sick contacts.  Patient denies fever, syncope, chest pain, dyspnea, hematemesis, dysuria, or any other systemic complaint.      External (non-ED) record review:   Reviewed note from office visit with cardiology on 12/13/2023 where patient seen for CAD, hyperlipidemia, hypertension.  Plan to obtain echo, patient will return to office in 1 year.  Reviewed prior laboratory studies.  Most recent CBC with hemoglobin 14.6.  Most recent CMP with creatinine 0.77.      PAST MEDICAL HISTORY  Active Ambulatory Problems     Diagnosis Date Noted    Disc disorder of cervical region 03/14/2016    Degeneration of intervertebral disc of lumbar region 03/14/2016    Steatosis of liver 03/14/2016    Fibrocystic breast changes 03/14/2016    Osteoarthritis of knee 03/14/2016    Osteopenia 03/14/2016    Prediabetes 03/14/2016    Scoliosis 03/14/2016    Lipoma of left lower extremity 08/04/2016    Family history of premature coronary artery disease 02/17/2017    Generalized anxiety disorder 03/14/2017    Arthralgia of metacarpophalangeal joint, right 08/09/2017    Essential hypertension 10/22/2021    Fibroids 02/10/2022    Other idiopathic scoliosis, lumbar region 05/12/2022    Hot flash not due to menopause 09/12/2022    Persistent headaches 09/23/2022    Mixed hyperlipidemia 12/19/2022    Bicuspid aortic valve 12/19/2022    Cerebrovascular small vessel disease 04/19/2023     Carotid artery plaque, bilateral 2023    Coronary artery calcification 2023    Ascending aorta dilation 08/10/2023     Resolved Ambulatory Problems     Diagnosis Date Noted    Diverticulosis of intestine 2016    Bone deformity 2016    Health care maintenance 2016    Diverticulitis of large intestine 2016    Aortic atherosclerosis 2017    Lightheadedness 02/10/2017    Hypokalemia 2017    New daily persistent headache 2017    Metabolic syndrome 2018    Dizziness 10/04/2021    Lower abdominal pain 02/10/2022    Acute effusion of both middle ears 2022     Past Medical History:   Diagnosis Date    Anxiety     DDD (degenerative disc disease), cervical     Hyperlipidemia     Hypertension     Impaired fasting blood sugar     Lateral epicondylitis of right elbow     Pancreatic cyst     Post-menopausal     Uterine leiomyoma          PAST SURGICAL HISTORY  Past Surgical History:   Procedure Laterality Date    COLONOSCOPY  2011        COLONOSCOPY N/A 2022    Procedure: COLONOSCOPY TO CECUM AND TERMINAL ILEUM;  Surgeon: Jay Contreras MD;  Location: [facility] ENDOSCOPY;  Service: Gastroenterology;  Laterality: N/A;  SCREENING  DIVERTICULOSIS, INTERNAL HEMORRHOIDS         FAMILY HISTORY  Family History   Problem Relation Age of Onset    Hypertension Mother     Osteoarthritis Mother     Breast cancer Mother         in situ    Stroke Mother     Diabetes Mother     Atrial fibrillation Mother     Heart attack Father 38    Coronary artery disease Father         CABG    Colon polyps Sister          SOCIAL HISTORY  Social History     Socioeconomic History    Marital status:     Number of children: 2   Tobacco Use    Smoking status: Former     Packs/day: 1.00     Years: 10.00     Additional pack years: 0.00     Total pack years: 10.00     Types: Cigarettes     Start date:      Quit date:      Years since quittin.0    Smokeless tobacco: Never    Vaping Use    Vaping Use: Never used   Substance and Sexual Activity    Alcohol use: Yes     Comment: social    Drug use: Defer    Sexual activity: Defer         ALLERGIES  Amoxicillin-pot clavulanate and Ampicillin        REVIEW OF SYSTEMS  Review of Systems   Constitutional:  Negative for chills and fever.   HENT:  Negative for ear pain and sore throat.    Respiratory:  Negative for cough and shortness of breath.    Cardiovascular:  Negative for chest pain and palpitations.   Gastrointestinal:  Positive for abdominal pain, diarrhea and vomiting.   Genitourinary:  Negative for dysuria and hematuria.   Musculoskeletal:  Positive for myalgias. Negative for arthralgias and joint swelling.   Skin:  Negative for pallor and rash.   Neurological:  Positive for headaches (Resolved). Negative for numbness.   Psychiatric/Behavioral:  Negative for confusion and hallucinations.             PHYSICAL EXAM  ED Triage Vitals [01/01/24 1052]   Temp Heart Rate Resp BP SpO2   99.1 °F (37.3 °C) 107 -- -- 98 %      Temp src Heart Rate Source Patient Position BP Location FiO2 (%)   Tympanic Monitor -- -- --       Physical Exam  Constitutional:       General: She is not in acute distress.     Appearance: She is well-developed.   HENT:      Head: Normocephalic and atraumatic.   Eyes:      Extraocular Movements: Extraocular movements intact.   Cardiovascular:      Rate and Rhythm: Normal rate and regular rhythm.      Heart sounds: Normal heart sounds.   Pulmonary:      Effort: Pulmonary effort is normal.      Breath sounds: Normal breath sounds.   Abdominal:      General: There is no distension.   Skin:     General: Skin is warm.   Neurological:      General: No focal deficit present.      Mental Status: She is alert and oriented to person, place, and time.   Psychiatric:         Mood and Affect: Mood normal.             Vital signs and nursing notes reviewed.          LAB RESULTS  Recent Results (from the past 24 hour(s))   Respiratory  Panel PCR w/COVID-19(SARS-CoV-2) [facility] In-House, NP Swab in UTM/VTM, 2 HR TAT - Swab, Nasopharynx    Collection Time: 01/01/24 12:05 PM    Specimen: Nasopharynx; Swab   Result Value Ref Range    ADENOVIRUS, PCR Not Detected Not Detected    Coronavirus 229E Not Detected Not Detected    Coronavirus HKU1 Not Detected Not Detected    Coronavirus NL63 Not Detected Not Detected    Coronavirus OC43 Not Detected Not Detected    COVID19 Not Detected Not Detected - Ref. Range    Human Metapneumovirus Not Detected Not Detected    Human Rhinovirus/Enterovirus Not Detected Not Detected    Influenza A PCR Not Detected Not Detected    Influenza B PCR Not Detected Not Detected    Parainfluenza Virus 1 Not Detected Not Detected    Parainfluenza Virus 2 Not Detected Not Detected    Parainfluenza Virus 3 Not Detected Not Detected    Parainfluenza Virus 4 Not Detected Not Detected    RSV, PCR Not Detected Not Detected    Bordetella pertussis pcr Not Detected Not Detected    Bordetella parapertussis PCR Not Detected Not Detected    Chlamydophila pneumoniae PCR Not Detected Not Detected    Mycoplasma pneumo by PCR Not Detected Not Detected   Comprehensive Metabolic Panel    Collection Time: 01/01/24 12:05 PM    Specimen: Blood   Result Value Ref Range    Glucose 144 (H) 65 - 99 mg/dL    BUN 13 8 - 23 mg/dL    Creatinine 0.84 0.57 - 1.00 mg/dL    Sodium 141 136 - 145 mmol/L    Potassium 3.2 (L) 3.5 - 5.2 mmol/L    Chloride 104 98 - 107 mmol/L    CO2 24.0 22.0 - 29.0 mmol/L    Calcium 9.4 8.6 - 10.5 mg/dL    Total Protein 6.7 6.0 - 8.5 g/dL    Albumin 4.7 3.5 - 5.2 g/dL    ALT (SGPT) 25 1 - 33 U/L    AST (SGOT) 21 1 - 32 U/L    Alkaline Phosphatase 85 39 - 117 U/L    Total Bilirubin 0.4 0.0 - 1.2 mg/dL    Globulin 2.0 gm/dL    A/G Ratio 2.4 g/dL    BUN/Creatinine Ratio 15.5 7.0 - 25.0    Anion Gap 13.0 5.0 - 15.0 mmol/L    eGFR 73.5 >60.0 mL/min/1.73   Lipase    Collection Time: 01/01/24 12:05 PM    Specimen: Blood    Result Value Ref Range    Lipase 14 13 - 60 U/L   CBC Auto Differential    Collection Time: 01/01/24 12:05 PM    Specimen: Blood   Result Value Ref Range    WBC 11.64 (H) 3.40 - 10.80 10*3/mm3    RBC 4.80 3.77 - 5.28 10*6/mm3    Hemoglobin 14.6 12.0 - 15.9 g/dL    Hematocrit 43.1 34.0 - 46.6 %    MCV 89.8 79.0 - 97.0 fL    MCH 30.4 26.6 - 33.0 pg    MCHC 33.9 31.5 - 35.7 g/dL    RDW 12.4 12.3 - 15.4 %    RDW-SD 41.1 37.0 - 54.0 fl    MPV 9.6 6.0 - 12.0 fL    Platelets 242 140 - 450 10*3/mm3    Neutrophil % 94.8 (H) 42.7 - 76.0 %    Lymphocyte % 2.1 (L) 19.6 - 45.3 %    Monocyte % 2.2 (L) 5.0 - 12.0 %    Eosinophil % 0.4 0.3 - 6.2 %    Basophil % 0.2 0.0 - 1.5 %    Immature Grans % 0.3 0.0 - 0.5 %    Neutrophils, Absolute 11.03 (H) 1.70 - 7.00 10*3/mm3    Lymphocytes, Absolute 0.25 (L) 0.70 - 3.10 10*3/mm3    Monocytes, Absolute 0.26 0.10 - 0.90 10*3/mm3    Eosinophils, Absolute 0.05 0.00 - 0.40 10*3/mm3    Basophils, Absolute 0.02 0.00 - 0.20 10*3/mm3    Immature Grans, Absolute 0.03 0.00 - 0.05 10*3/mm3    nRBC 0.0 0.0 - 0.2 /100 WBC   Urinalysis With Microscopic If Indicated (No Culture) - Urine, Clean Catch    Collection Time: 01/01/24  2:25 PM    Specimen: Urine, Clean Catch   Result Value Ref Range    Color, UA Yellow Yellow, Straw    Appearance, UA Clear Clear    pH, UA 6.5 5.0 - 8.0    Specific Gravity, UA 1.014 1.005 - 1.030    Glucose, UA Negative Negative    Ketones, UA Negative Negative    Bilirubin, UA Negative Negative    Blood, UA Negative Negative    Protein, UA Negative Negative    Leuk Esterase, UA Negative Negative    Nitrite, UA Negative Negative    Urobilinogen, UA 0.2 E.U./dL 0.2 - 1.0 E.U./dL       Ordered the above labs and reviewed the results.        RADIOLOGY  CT Abdomen Pelvis Without Contrast    Result Date: 1/1/2024  CT ABDOMEN PELVIS WO CONTRAST-  INDICATIONS: Vomiting, diarrhea  TECHNIQUE: Radiation dose reduction techniques were utilized, including automated exposure control and  exposure modulation based on body size. Unenhanced ABDOMEN AND PELVIS CT  COMPARISON: 2/10/2022  FINDINGS:  A left hepatic cystic lesion appears similar to prior exam. A liver low-density too small to characterize is also stable. Right hemidiaphragm is elevated.  Small nonobstructive calcifications are seen in each kidney, measuring up to about 2 mm.  Otherwise unremarkable unenhanced appearance of the liver, gallbladder, spleen, adrenal glands, pancreas, kidneys, bladder. A left adnexal cystic focus measures 4.9 cm on sagittal image 122; on the prior exam, a 2.2 cm cystic focus was present at this level. At the right adnexa, cystic focus measures 2.3 cm.  No bowel obstruction or abnormal bowel thickening is identified. Colonic diverticula are seen that do not appear inflamed. The appendix does not appear inflamed.  No free intraperitoneal gas or free fluid.  Scattered small mesenteric and para-aortic lymph nodes are seen that are not significant by size criteria.  Abdominal aorta is not aneurysmal. Aortic and other arterial calcifications are present.  The lung bases show minimal atelectasis. The heart size is borderline.  Degenerative changes are seen in the spine. No acute fracture is identified.         1. Colonic diverticulosis. No acute inflammatory process of bowel is identified. Follow-up as indications persist.  2. Bilateral nonobstructive nephrolithiasis. Increased left more than right adnexal cystic lesions.    This report was finalized on 1/1/2024 1:10 PM by Dr. Jose Hand M.D on Workstation: [facility]       Ordered the above noted radiological studies. Reviewed by me in PACS.              MEDICATIONS GIVEN IN ER  Medications   lactated ringers bolus 1,000 mL (0 mL Intravenous Stopped 1/1/24 1425)   ondansetron (ZOFRAN) injection 4 mg (4 mg Intravenous Given 1/1/24 1201)   ketorolac (TORADOL) injection 15 mg (15 mg Intravenous Given 1/1/24 1201)   potassium chloride (K-DUR,KLOR-CON) ER tablet 40  mEq (40 mEq Oral Given 1/1/24 1536)   famotidine (PEPCID) injection 20 mg (20 mg Intravenous Given 1/1/24 1536)   dicyclomine (BENTYL) capsule 20 mg (20 mg Oral Given 1/1/24 1536)                   MEDICAL DECISION MAKING, PROGRESS, and CONSULTS    All labs have been independently reviewed by me.  All radiology studies have been reviewed by me and I have also reviewed the radiology report.   EKG's independently viewed and interpreted by me.  Discussion below represents my analysis of pertinent findings related to patient's condition, differential diagnosis, treatment plan and final disposition.            Orders placed during this visit:  Orders Placed This Encounter   Procedures    Respiratory Panel PCR w/COVID-19(SARS-CoV-2) [facility] In-House, NP Swab in UTM/VTM, 2 HR TAT - Swab, Nasopharynx    CT Abdomen Pelvis Without Contrast    Comprehensive Metabolic Panel    Lipase    Urinalysis With Microscopic If Indicated (No Culture) - Urine, Clean Catch    CBC Auto Differential    CBC & Differential           Differential diagnosis:  Influenza, COVID infection, diverticulitis      Independent interpretation of labs, radiology studies, and discussions with consultants:  ED Course as of 01/01/24 1903   Mon Jan 01, 2024   1520 I reassessed the patient.  Discussed her workup findings and suspect patient has viral infection.  Given mildly elevated white count with history of diverticulitis, I did offer patient antibiotic therapy but she declines at this time.  We will plan to treat symptomatically with Pepcid, Bentyl, Zofran.  I encouraged the patient to follow-up closely with PCP and discussed ED return precautions.  She is otherwise well-appearing, hemodynamically stable, and therefore appropriate for discharge. [MP]      ED Course User Index  [MP] Keira Weber PA-C         Additional orders considered but not ordered:  Chest x-ray      Additional sources:    - Chronic or social conditions impacting  care: Hypertension          DIAGNOSIS  Final diagnoses:   Generalized abdominal pain   Viral syndrome   Hypokalemia           Follow Up:  Sabino Trinidad MD  [address]  [address]  [place] [address]  [phone]    Schedule an appointment as soon as possible for a visit   For reevaluation after ED visit      RX:     Medication List        New Prescriptions      dicyclomine 10 MG capsule  Commonly known as: BENTYL  Take 1 capsule by mouth 3 (Three) Times a Day As Needed (Abdominal pain).     famotidine 20 MG tablet  Commonly known as: PEPCID  Take 1 tablet by mouth 2 (Two) Times a Day As Needed (Abdominal discomfort).     ondansetron ODT 4 MG disintegrating tablet  Commonly known as: ZOFRAN-ODT  Place 1 tablet on the tongue Every 8 (Eight) Hours As Needed for Nausea or Vomiting.               Where to Get Your Medications        These medications were sent to [facility] [id number] - [place], [place] - [facility] [address] AT [facility] & [address] - [phone]  - [phone] [facility] [address]  [address], [place] [address]      Phone: [phone]   dicyclomine 10 MG capsule  famotidine 20 MG tablet  ondansetron ODT 4 MG disintegrating tablet         Latest Documented Vital Signs:  As of 19:03 EST  BP- 141/87 HR- 76 Temp- 99.1 °F (37.3 °C) (Tympanic) O2 sat- 94%              --    Please note that portions of this were completed with a voice recognition program.       Note Disclaimer: At [facility], we believe that sharing information builds trust and better relationships. You are receiving this note because you are receiving care at [facility] or recently visited. It is possible you will see health information before a provider has talked with you about it. This kind of information can be easy to misunderstand. To help you fully understand what it means for your health, we urge you to discuss this note with your provider.             Keira Weber PA-C  01/01/24 7464

## 2024-01-01 NOTE — ED PROVIDER NOTES
MD ATTESTATION NOTE    The [place] and I have discussed this patient's history, physical exam, and treatment plan.  I have reviewed the documentation and personally had a face to face interaction with the patient. I affirm the documentation and agree with the treatment and plan.  The attached note describes my personal findings.      I provided a substantive portion of the care of the patient.  I personally performed the physical exam in its entirety, and below are my findings.      Brief HPI: 73-year-old female who presents emergency room complaining of headache, not feeling well, vomiting, diarrhea, body aches and abdominal discomfort.  The patient states this feels similar to prior diverticulitis.  She denies known contacts.  She denies fever, chest pain, shortness of breath or dysuria.    General : 73-year-old patient is awake alert and oriented in no acute distress  HEENT: NCAT  CV: Heart is regular with no murmurs  Respiratory: CTA bilaterally  Abd: Soft with mild lower abdominal discomfort but no guarding or rebound and diminished bowel sounds  Ext: No acute abnormalities  Skin: No rash  Neuro: Awake with a nonfocal neuro exam  Psych: Normal mood and affect      Plan: We will check labs, urinalysis, RVP and abdominal CT scan for further evaluation.  Patient's RVP is negative.  Her CT scan shows diverticulosis but no diverticulitis.  White count is minimal elevated 11.6.  We advised the patient of her CT not showing acute diverticulitis but offered her a course of antibiotics if she felt like this was truly her diverticulitis.  The patient states that this may all be due to her viral syndrome and currently would like to hold off but will follow-up with her PCP or return if worse.     Leonard Geronimo MD  01/01/24 8211

## 2024-01-03 ENCOUNTER — TELEPHONE (OUTPATIENT)
Dept: CASE MANAGEMENT | Facility: OTHER | Age: 74
End: 2024-01-03
Payer: MEDICARE

## 2024-01-04 ENCOUNTER — TELEPHONE (OUTPATIENT)
Dept: CASE MANAGEMENT | Facility: OTHER | Age: 74
End: 2024-01-04
Payer: MEDICARE

## 2024-01-20 DIAGNOSIS — E78.00 HYPERCHOLESTEROLEMIA: ICD-10-CM

## 2024-01-22 RX ORDER — ATORVASTATIN CALCIUM 20 MG/1
20 TABLET, FILM COATED ORAL DAILY
Qty: 90 TABLET | Refills: 0 | Status: SHIPPED | OUTPATIENT
Start: 2024-01-22

## 2024-01-30 ENCOUNTER — APPOINTMENT (OUTPATIENT)
Dept: WOMENS IMAGING | Facility: HOSPITAL | Age: 74
End: 2024-01-30
Payer: MEDICARE

## 2024-01-30 PROCEDURE — 77063 BREAST TOMOSYNTHESIS BI: CPT | Performed by: RADIOLOGY

## 2024-01-30 PROCEDURE — 77067 SCR MAMMO BI INCL CAD: CPT | Performed by: RADIOLOGY

## 2024-02-14 ENCOUNTER — OFFICE VISIT (OUTPATIENT)
Dept: INTERNAL MEDICINE | Facility: CLINIC | Age: 74
End: 2024-02-14
Payer: MEDICARE

## 2024-02-14 VITALS
DIASTOLIC BLOOD PRESSURE: 82 MMHG | OXYGEN SATURATION: 98 % | WEIGHT: 176 LBS | BODY MASS INDEX: 28.28 KG/M2 | HEART RATE: 78 BPM | SYSTOLIC BLOOD PRESSURE: 130 MMHG | HEIGHT: 66 IN | RESPIRATION RATE: 18 BRPM

## 2024-02-14 DIAGNOSIS — M67.442 GANGLION CYST OF TENDON SHEATH OF LEFT HAND: Primary | ICD-10-CM

## 2024-02-14 PROCEDURE — 99212 OFFICE O/P EST SF 10 MIN: CPT | Performed by: FAMILY MEDICINE

## 2024-02-14 PROCEDURE — 3075F SYST BP GE 130 - 139MM HG: CPT | Performed by: FAMILY MEDICINE

## 2024-02-14 PROCEDURE — 1159F MED LIST DOCD IN RCRD: CPT | Performed by: FAMILY MEDICINE

## 2024-02-14 PROCEDURE — 3079F DIAST BP 80-89 MM HG: CPT | Performed by: FAMILY MEDICINE

## 2024-02-14 PROCEDURE — 1160F RVW MEDS BY RX/DR IN RCRD: CPT | Performed by: FAMILY MEDICINE

## 2024-02-29 DIAGNOSIS — I10 BENIGN ESSENTIAL HYPERTENSION: ICD-10-CM

## 2024-03-01 RX ORDER — AMLODIPINE BESYLATE 5 MG/1
TABLET ORAL
Qty: 90 TABLET | Refills: 4 | Status: SHIPPED | OUTPATIENT
Start: 2024-03-01

## 2024-04-10 DIAGNOSIS — I10 BENIGN ESSENTIAL HYPERTENSION: ICD-10-CM

## 2024-04-10 RX ORDER — OLMESARTAN MEDOXOMIL 40 MG/1
40 TABLET ORAL DAILY
Qty: 90 TABLET | Refills: 1 | Status: SHIPPED | OUTPATIENT
Start: 2024-04-10

## 2024-04-23 ENCOUNTER — OFFICE VISIT (OUTPATIENT)
Dept: INTERNAL MEDICINE | Facility: CLINIC | Age: 74
End: 2024-04-23
Payer: MEDICARE

## 2024-04-23 VITALS
DIASTOLIC BLOOD PRESSURE: 80 MMHG | SYSTOLIC BLOOD PRESSURE: 130 MMHG | HEIGHT: 66 IN | WEIGHT: 175 LBS | OXYGEN SATURATION: 98 % | HEART RATE: 78 BPM | RESPIRATION RATE: 18 BRPM | BODY MASS INDEX: 28.12 KG/M2

## 2024-04-23 DIAGNOSIS — R73.03 PREDIABETES: Chronic | ICD-10-CM

## 2024-04-23 DIAGNOSIS — I10 ESSENTIAL HYPERTENSION: Primary | Chronic | ICD-10-CM

## 2024-04-23 DIAGNOSIS — E78.2 MIXED HYPERLIPIDEMIA: Chronic | ICD-10-CM

## 2024-04-23 DIAGNOSIS — E78.00 HYPERCHOLESTEROLEMIA: ICD-10-CM

## 2024-04-23 LAB
ALBUMIN SERPL-MCNC: 4.8 G/DL (ref 3.5–5.2)
ALBUMIN/GLOB SERPL: 2.4 G/DL
ALP SERPL-CCNC: 92 U/L (ref 39–117)
ALT SERPL-CCNC: 23 U/L (ref 1–33)
AST SERPL-CCNC: 18 U/L (ref 1–32)
BILIRUB SERPL-MCNC: 0.5 MG/DL (ref 0–1.2)
BUN SERPL-MCNC: 12 MG/DL (ref 8–23)
BUN/CREAT SERPL: 14.3 (ref 7–25)
CALCIUM SERPL-MCNC: 9.8 MG/DL (ref 8.6–10.5)
CHLORIDE SERPL-SCNC: 107 MMOL/L (ref 98–107)
CHOLEST SERPL-MCNC: 177 MG/DL (ref 0–200)
CO2 SERPL-SCNC: 28.5 MMOL/L (ref 22–29)
CREAT SERPL-MCNC: 0.84 MG/DL (ref 0.57–1)
EGFRCR SERPLBLD CKD-EPI 2021: 73.5 ML/MIN/1.73
ERYTHROCYTE [DISTWIDTH] IN BLOOD BY AUTOMATED COUNT: 12.3 % (ref 12.3–15.4)
GLOBULIN SER CALC-MCNC: 2 GM/DL
GLUCOSE SERPL-MCNC: 125 MG/DL (ref 65–99)
HBA1C MFR BLD: 5.8 % (ref 4.8–5.6)
HCT VFR BLD AUTO: 42.8 % (ref 34–46.6)
HDLC SERPL-MCNC: 55 MG/DL (ref 40–60)
HGB BLD-MCNC: 14.4 G/DL (ref 12–15.9)
LDLC SERPL CALC-MCNC: 97 MG/DL (ref 0–100)
LDLC/HDLC SERPL: 1.7 {RATIO}
MCH RBC QN AUTO: 30.4 PG (ref 26.6–33)
MCHC RBC AUTO-ENTMCNC: 33.6 G/DL (ref 31.5–35.7)
MCV RBC AUTO: 90.3 FL (ref 79–97)
PLATELET # BLD AUTO: 289 10*3/MM3 (ref 140–450)
POTASSIUM SERPL-SCNC: 4.2 MMOL/L (ref 3.5–5.2)
PROT SERPL-MCNC: 6.8 G/DL (ref 6–8.5)
RBC # BLD AUTO: 4.74 10*6/MM3 (ref 3.77–5.28)
SODIUM SERPL-SCNC: 145 MMOL/L (ref 136–145)
TRIGL SERPL-MCNC: 143 MG/DL (ref 0–150)
VLDLC SERPL CALC-MCNC: 25 MG/DL (ref 5–40)
WBC # BLD AUTO: 7.12 10*3/MM3 (ref 3.4–10.8)

## 2024-04-23 PROCEDURE — G2211 COMPLEX E/M VISIT ADD ON: HCPCS | Performed by: FAMILY MEDICINE

## 2024-04-23 PROCEDURE — 1159F MED LIST DOCD IN RCRD: CPT | Performed by: FAMILY MEDICINE

## 2024-04-23 PROCEDURE — 1160F RVW MEDS BY RX/DR IN RCRD: CPT | Performed by: FAMILY MEDICINE

## 2024-04-23 PROCEDURE — 3075F SYST BP GE 130 - 139MM HG: CPT | Performed by: FAMILY MEDICINE

## 2024-04-23 PROCEDURE — 99214 OFFICE O/P EST MOD 30 MIN: CPT | Performed by: FAMILY MEDICINE

## 2024-04-23 PROCEDURE — 3079F DIAST BP 80-89 MM HG: CPT | Performed by: FAMILY MEDICINE

## 2024-04-23 NOTE — PROGRESS NOTES
Chief Complaint  Follow-up and Hypertension    Subjective        Jana Page presents to [facility]  History of Present Illness    She is following up today for HTN, HLD, prediabetes and anxiety.  She is doing well and tolerating her medication as prescribed.  Symptoms are controlled.  Trying to work on her diet to keep her numbers in control.  Blood pressure controlled in the office today.  Overall health has been doing okay.    Current Outpatient Medications:     Accu-Chek FastClix Lancets misc, Use to check blood sugar once daily, Disp: 100 each, Rfl: 2    amLODIPine (NORVASC) 5 MG tablet, TAKE ONE TABLET BY MOUTH DAILY, Disp: 90 tablet, Rfl: 4    aspirin 81 MG chewable tablet, Chew 1 tablet Daily., Disp: , Rfl:     atorvastatin (LIPITOR) 20 MG tablet, TAKE 1 TABLET BY MOUTH DAILY, Disp: 90 tablet, Rfl: 0    Cholecalciferol (VITAMIN D) 1000 UNITS tablet, Take  by mouth., Disp: , Rfl:     Coenzyme Q10 (CO Q 10) 10 MG capsule, Take  by mouth Daily., Disp: , Rfl:     dicyclomine (BENTYL) 10 MG capsule, Take 1 capsule by mouth 3 (Three) Times a Day As Needed (Abdominal pain)., Disp: 15 capsule, Rfl: 0    DULoxetine (CYMBALTA) 60 MG capsule, TAKE ONE CAPSULE BY MOUTH DAILY, Disp: 90 capsule, Rfl: 3    famotidine (PEPCID) 20 MG tablet, Take 1 tablet by mouth 2 (Two) Times a Day As Needed (Abdominal discomfort)., Disp: 15 tablet, Rfl: 0    fluticasone (FLONASE) 50 MCG/ACT nasal spray, 2 sprays into the nostril(s) as directed by provider Daily. Administer 2 sprays in each nostril for each dose., Disp: , Rfl:     furosemide (LASIX) 20 MG tablet, Take 1 tablet by mouth Daily As Needed (edema)., Disp: 60 tablet, Rfl: 1    Multiple Vitamin (MULTIVITAMIN) capsule, Take 1 capsule by mouth Daily., Disp: , Rfl:     olmesartan (BENICAR) 40 MG tablet, TAKE 1 TABLET BY MOUTH DAILY, Disp: 90 tablet, Rfl: 1    ondansetron ODT (ZOFRAN-ODT) 4 MG disintegrating tablet, Place 1 tablet on the tongue  "Every 8 (Eight) Hours As Needed for Nausea or Vomiting., Disp: 15 tablet, Rfl: 0    Probiotic Product (PRO-BIOTIC BLEND) capsule, Take  by mouth Daily., Disp: , Rfl:     propranolol (INDERAL) 60 MG tablet, TAKE ONE TABLET BY MOUTH TWICE A DAY, Disp: 180 tablet, Rfl: 3    RSVPreF3 Vac Recomb Adjuvanted (Arexvy) 120 MCG/0.5ML reconstituted suspension injection, Inject  into the appropriate muscle as directed by prescriber., Disp: 1 each, Rfl: 0      Objective   Vital Signs:  /80 (BP Location: Left arm, Patient Position: Sitting, Cuff Size: Small Adult)   Pulse 78   Resp 18   Ht 167.6 cm (66\")   Wt 79.4 kg (175 lb)   SpO2 98%   BMI 28.25 kg/m²   Estimated body mass index is 28.25 kg/m² as calculated from the following:    Height as of this encounter: 167.6 cm (66\").    Weight as of this encounter: 79.4 kg (175 lb).               Physical Exam  Vitals and nursing note reviewed.   Constitutional:       General: She is not in acute distress.     Appearance: Normal appearance.   Cardiovascular:      Rate and Rhythm: Normal rate and regular rhythm.      Heart sounds: Normal heart sounds. No murmur heard.  Pulmonary:      Effort: Pulmonary effort is normal.      Breath sounds: Normal breath sounds.   Neurological:      Mental Status: She is alert.        Result Review :    The following data was reviewed by: Sabino Trinidad MD on 04/23/2024:  Common labs          7/7/2023    14:57 10/23/2023    10:05 1/1/2024    12:05   Common Labs   Glucose 119  138  144    BUN 12  13  13    Creatinine 0.83  0.77  0.84    Sodium 143  143  141    Potassium 3.6  4.2  3.2    Chloride 107  105  104    Calcium 10.1  10.1  9.4    Total Protein  6.9     Albumin 4.8  5.1  4.7    Total Bilirubin 0.8  0.5  0.4    Alkaline Phosphatase 90  84  85    AST (SGOT) 16  19  21    ALT (SGPT) 22  28  25    WBC 8.21  8.40  11.64    Hemoglobin 14.7  14.6  14.6    Hematocrit 42.6  43.1  43.1    Platelets 305  338  242    Total Cholesterol  193   "   Triglycerides  171     HDL Cholesterol  58     LDL Cholesterol   105     Hemoglobin A1C  5.90                    Assessment and Plan     Diagnoses and all orders for this visit:    1. Essential hypertension (Primary)  -     CBC (No Diff)  -     Comprehensive Metabolic Panel    2. Mixed hyperlipidemia  -     CBC (No Diff)  -     Comprehensive Metabolic Panel  -     Lipid Panel With LDL / HDL Ratio    3. Prediabetes  -     Comprehensive Metabolic Panel  -     Hemoglobin A1c        Clinically stable chronic conditions as above.  Continue all medications as above.  Labs reviewed/ordered as above.           Follow Up     Return in about 6 months (around 10/23/2024) for Recheck.  Patient was given instructions and counseling regarding her condition or for health maintenance advice. Please see specific information pulled into the AVS if appropriate.

## 2024-04-24 RX ORDER — ATORVASTATIN CALCIUM 20 MG/1
20 TABLET, FILM COATED ORAL DAILY
Qty: 90 TABLET | Refills: 0 | Status: SHIPPED | OUTPATIENT
Start: 2024-04-24

## 2024-04-29 ENCOUNTER — TELEPHONE (OUTPATIENT)
Dept: INTERNAL MEDICINE | Facility: CLINIC | Age: 74
End: 2024-04-29
Payer: MEDICARE

## 2024-04-29 NOTE — TELEPHONE ENCOUNTER
Caller: Jana Page    Relationship: Self    Best call back number:     [phone]       What is the best time to reach you: ANY    Who are you requesting to speak with (clinical staff, provider,  specific staff member): CLINICAL    Do you know the name of the person who called:     What was the call regarding: LAB RESULTS    Is it okay if the provider responds through MyChart:

## 2024-05-21 DIAGNOSIS — R42 LIGHTHEADEDNESS: ICD-10-CM

## 2024-05-21 DIAGNOSIS — F41.9 ANXIETY: ICD-10-CM

## 2024-05-22 RX ORDER — DULOXETIN HYDROCHLORIDE 60 MG/1
CAPSULE, DELAYED RELEASE ORAL
Qty: 90 CAPSULE | Refills: 1 | Status: SHIPPED | OUTPATIENT
Start: 2024-05-22

## 2024-06-08 ENCOUNTER — HOSPITAL ENCOUNTER (EMERGENCY)
Facility: HOSPITAL | Age: 74
Discharge: HOME | End: 2024-06-08
Attending: EMERGENCY MEDICINE | Admitting: EMERGENCY MEDICINE
Payer: MEDICARE

## 2024-06-08 VITALS
OXYGEN SATURATION: 93 % | RESPIRATION RATE: 16 BRPM | SYSTOLIC BLOOD PRESSURE: 121 MMHG | TEMPERATURE: 98.5 F | DIASTOLIC BLOOD PRESSURE: 89 MMHG | HEART RATE: 62 BPM

## 2024-06-08 DIAGNOSIS — R04.0 EPISTAXIS: Primary | ICD-10-CM

## 2024-06-08 PROCEDURE — 99282 EMERGENCY DEPT VISIT SF MDM: CPT

## 2024-06-08 NOTE — ED PROVIDER NOTES
EMERGENCY DEPARTMENT ENCOUNTER      PCP: Sabino Trinidad MD  Patient Care Team:  Sabino Trinidad MD as PCP - General (Family Medicine)  Jd Mcmahon MD as Cardiologist (Cardiology)   Independent Historians: Patient    HPI:  Chief Complaint: Epistaxis  A complete HPI/ROS/PMH/PSH/SH/FH are unobtainable due to: None    Chronic or social conditions impacting patient care (social determinants of health): None    Context: Jana Page is a 73 y.o. female who presents to the ED c/o acute right-sided epistaxis that began prior to arrival.  She states he was unable to get bleeding to stop for approximately 30 minutes.  She has not had frequent nosebleeds previously.  She states she did scratch at her nose and is unsure if this caused the bleeding.  She does not take any blood thinners.  She denies any other symptoms.  Bleeding did improve just prior to arriving to the ER.    Review of prior external notes and/or external test results outside of this encounter: CBC on 4/23/2024 showed normal hemoglobin of 14.4.      PAST MEDICAL HISTORY  Active Ambulatory Problems     Diagnosis Date Noted    Disc disorder of cervical region 03/14/2016    Degeneration of intervertebral disc of lumbar region 03/14/2016    Steatosis of liver 03/14/2016    Fibrocystic breast changes 03/14/2016    Osteoarthritis of knee 03/14/2016    Osteopenia 03/14/2016    Prediabetes 03/14/2016    Scoliosis 03/14/2016    Lipoma of left lower extremity 08/04/2016    Family history of premature coronary artery disease 02/17/2017    Generalized anxiety disorder 03/14/2017    Arthralgia of metacarpophalangeal joint, right 08/09/2017    Essential hypertension 10/22/2021    Fibroids 02/10/2022    Other idiopathic scoliosis, lumbar region 05/12/2022    Hot flash not due to menopause 09/12/2022    Persistent headaches 09/23/2022    Mixed hyperlipidemia 12/19/2022    Bicuspid aortic valve 12/19/2022    Cerebrovascular small vessel disease 04/19/2023     Carotid artery plaque, bilateral 01/2023    Coronary artery calcification 07/17/2023    Ascending aorta dilation 08/10/2023     Resolved Ambulatory Problems     Diagnosis Date Noted    Diverticulosis of intestine 03/14/2016    Bone deformity 03/14/2016    Health care maintenance 08/04/2016    Diverticulitis of large intestine 08/19/2016    Aortic atherosclerosis 02/07/2017    Lightheadedness 02/10/2017    Hypokalemia 02/17/2017    New daily persistent headache 03/09/2017    Metabolic syndrome 08/17/2018    Dizziness 10/04/2021    Lower abdominal pain 02/10/2022    Acute effusion of both middle ears 09/23/2022     Past Medical History:   Diagnosis Date    Anxiety     DDD (degenerative disc disease), cervical     Hyperlipidemia     Hypertension     Impaired fasting blood sugar     Lateral epicondylitis of right elbow     Pancreatic cyst     Post-menopausal     Uterine leiomyoma        The patient has started, but not completed, their COVID-19 vaccination series.    PAST SURGICAL HISTORY  Past Surgical History:   Procedure Laterality Date    COLONOSCOPY  06/14/2011        COLONOSCOPY N/A 4/26/2022    Procedure: COLONOSCOPY TO CECUM AND TERMINAL ILEUM;  Surgeon: Jay Contreras MD;  Location: [facility] ENDOSCOPY;  Service: Gastroenterology;  Laterality: N/A;  SCREENING  DIVERTICULOSIS, INTERNAL HEMORRHOIDS         FAMILY HISTORY  Family History   Problem Relation Age of Onset    Hypertension Mother     Osteoarthritis Mother     Breast cancer Mother         in situ    Stroke Mother     Diabetes Mother     Atrial fibrillation Mother     Heart attack Father 38    Coronary artery disease Father         CABG    Colon polyps Sister          SOCIAL HISTORY  Social History     Socioeconomic History    Marital status:     Number of children: 2   Tobacco Use    Smoking status: Former     Current packs/day: 0.00     Average packs/day: 1 pack/day for 10.0 years (10.0 ttl pk-yrs)     Types: Cigarettes     Start date: 1961      Quit date:      Years since quittin.4    Smokeless tobacco: Never   Vaping Use    Vaping status: Never Used   Substance and Sexual Activity    Alcohol use: Yes     Comment: social    Drug use: Defer    Sexual activity: Defer         ALLERGIES  Amoxicillin-pot clavulanate and Ampicillin        REVIEW OF SYSTEMS  Review of Systems   HENT:  Positive for nosebleeds.    Respiratory:  Negative for shortness of breath.    Neurological:  Negative for syncope and headaches.        All systems reviewed and negative except for those discussed in HPI.       PHYSICAL EXAM    I have reviewed the triage vital signs and nursing notes.    ED Triage Vitals   Temp Heart Rate Resp BP SpO2   24 1641 24 1641 24 1641 24 1655 24 1641   98.5 °F (36.9 °C) 71 16 136/80 96 %      Temp src Heart Rate Source Patient Position BP Location FiO2 (%)   -- -- -- -- --              Physical Exam  GENERAL: alert, no acute distress  SKIN: Warm, dry  HENT: Normocephalic, atraumatic, small scabbed lesion to medial aspect of right nare, no active bleeding, no blood in the posterior oropharynx  EYES: no scleral icterus  CV: regular rhythm, regular rate  RESPIRATORY: normal effort, lungs clear  ABDOMEN: soft, nontender, nondistended  MUSCULOSKELETAL: no deformity  NEURO: alert, moves all extremities, follows commands          LAB RESULTS  No results found for this or any previous visit (from the past 24 hour(s)).    Ordered the above labs and independently reviewed and interpreted the results.        RADIOLOGY  No Radiology Exams Resulted Within Past 24 Hours    I ordered the above noted radiological studies. Independently reviewed and interpreted by me.  See dictation for official radiology interpretation.      PROCEDURES    Procedures      MEDICATIONS GIVEN IN ER    Medications - No data to display      PROGRESS, DATA ANALYSIS, CONSULTS, AND MEDICAL DECISION MAKING    All labs have been independently reviewed and  interpreted by me.  All radiology studies have been independently reviewed and interpreted by me and discussed with radiologist dictating the report.   EKG's independently reviewed and interpreted by me.  Discussion below represents my analysis of pertinent findings related to patient's condition, differential diagnosis, treatment plan and final disposition.    Differential diagnosis: Anterior epistaxis, posterior epistaxis, trauma, fracture, septal hematoma    ED Course as of 06/08/24 1805   Sat Jun 08, 2024 1804 Patient bleeding is resolved and has not reoccurred while being monitored in the ER.  Discussed follow-up instructions and return precautions. [DC]      ED Course User Index  [DC] Trini Joe PA             AS OF 18:05 EDT VITALS:    BP - 124/86  HR - 64  TEMP - 98.5 °F (36.9 °C)  O2 SATS - 94%        DIAGNOSIS  Final diagnoses:   Epistaxis         DISPOSITION  ED Disposition       ED Disposition   Discharge    Condition   Stable    Comment   --                  Note Disclaimer: At [facility], we believe that sharing information builds trust and better relationships. You are receiving this note because you recently visited [facility]. It is possible you will see health information before a provider has talked with you about it. This kind of information can be easy to misunderstand. To help you fully understand what it means for your health, we urge you to discuss this note with your provider.         Trini Joe PA  06/08/24 1805

## 2024-06-08 NOTE — ED NOTES
Patient to ER via car from home for nose bleed x 20-30 minutes  Bleeding controled at time of triage    No on blood thinners

## 2024-06-08 NOTE — ED PROVIDER NOTES
MD ATTESTATION NOTE     SHARED VISIT: This visit was performed by BOTH a physician and an APC. The substantive portion of the medical decision making was performed by this attesting physician who made or approved the management plan and takes responsibility for patient management. All studies in the APC note (if performed) were independently interpreted by me.  The [place] and I have discussed this patient's history, physical exam, and treatment plan. I have reviewed the documentation and personally had a face to face interaction with the patient. I affirm the documentation and agree with the treatment and plan. I provided a substantive portion of the care of the patient.  I personally performed the physical exam in its entirety, and below are my findings.      Brief HPI: Patient complains of a right-sided nosebleed that began just prior to arrival.  It resolved spontaneously after about 30 minutes.  Bleeding started after she scratched the inside of her nose with her finger.  She is not on anticoagulants.  Denies dizziness, shortness of breath, or syncope.    PHYSICAL EXAM  ED Triage Vitals   Temp Heart Rate Resp BP SpO2   06/08/24 1641 06/08/24 1641 06/08/24 1641 06/08/24 1655 06/08/24 1641   98.5 °F (36.9 °C) 71 16 136/80 96 %      Temp src Heart Rate Source Patient Position BP Location FiO2 (%)   -- -- -- -- --                GENERAL: Awake, alert, oriented x 3.  Well-developed, well-nourished elderly female.  Resting comfortably in no acute distress  HENT: nares patent, there is no active bleeding in either nare, no blood in the exterior oropharynx  EYES: no scleral icterus  CV: regular rhythm, normal rate  RESPIRATORY: normal effort  ABDOMEN: soft  MUSCULOSKELETAL: no deformity  NEURO: alert, moves all extremities, follows commands  PSYCH:  calm, cooperative  SKIN: warm, dry    Vital signs and nursing notes reviewed.        Plan: Patient did not have any recurrence of bleeding while in the ED.  She will be  discharged.       Vamsi Nolan MD  06/08/24 [id number]

## 2024-06-08 NOTE — ED NOTES
Patient c/o sudden onset nosebleed that began in her right nostril 30 minutes ago. Patient states she has never had a nosebleed. Patient states that she uses Flonase and Nasocort.

## 2024-06-17 ENCOUNTER — OFFICE VISIT (OUTPATIENT)
Dept: INTERNAL MEDICINE | Facility: CLINIC | Age: 74
End: 2024-06-17
Payer: MEDICARE

## 2024-06-17 VITALS
HEIGHT: 66 IN | TEMPERATURE: 98.9 F | SYSTOLIC BLOOD PRESSURE: 122 MMHG | OXYGEN SATURATION: 96 % | BODY MASS INDEX: 28.45 KG/M2 | DIASTOLIC BLOOD PRESSURE: 72 MMHG | HEART RATE: 69 BPM | WEIGHT: 177 LBS

## 2024-06-17 DIAGNOSIS — Z87.898 H/O EPISTAXIS: Primary | ICD-10-CM

## 2024-06-17 PROCEDURE — 3078F DIAST BP <80 MM HG: CPT

## 2024-06-17 PROCEDURE — 3074F SYST BP LT 130 MM HG: CPT

## 2024-06-17 PROCEDURE — 99213 OFFICE O/P EST LOW 20 MIN: CPT

## 2024-06-17 PROCEDURE — 1160F RVW MEDS BY RX/DR IN RCRD: CPT

## 2024-06-17 PROCEDURE — 1125F AMNT PAIN NOTED PAIN PRSNT: CPT

## 2024-06-17 PROCEDURE — 1159F MED LIST DOCD IN RCRD: CPT

## 2024-06-17 NOTE — PROGRESS NOTES
"Chief Complaint  Nose Bleed (Went to ED on 6/8)    Subjective        Jana Page presents to [facility]  History of Present Illness  73-year-old female presenting for nosebleed follow-up.  Patient of Dr. Trinidad.  Was seen in ER for nosebleed.  States that the nosebleed started without any trigger.  Was unable to stop it at home.  Denies any recent illness, headaches, vision changes or blood pressure issues.  Has had 2 more episodes since going to the ER but is able to stop it by placing a clip on her nose.  Most recent episode was a week ago.  Nose Bleed         Objective   Vital Signs:  /72 (BP Location: Left arm, Patient Position: Sitting, Cuff Size: Adult)   Pulse 69   Temp 98.9 °F (37.2 °C)   Ht 167.6 cm (66\")   Wt 80.3 kg (177 lb)   SpO2 96%   BMI 28.57 kg/m²   Estimated body mass index is 28.57 kg/m² as calculated from the following:    Height as of this encounter: 167.6 cm (66\").    Weight as of this encounter: 80.3 kg (177 lb).               Physical Exam  Vitals reviewed.   Constitutional:       Appearance: Normal appearance.   HENT:      Head: Normocephalic.      Nose:      Right Turbinates: Swollen.   Musculoskeletal:         General: Normal range of motion.      Cervical back: Normal range of motion.   Skin:     General: Skin is warm and dry.      Capillary Refill: Capillary refill takes less than 2 seconds.   Neurological:      General: No focal deficit present.      Mental Status: She is alert and oriented to person, place, and time.   Psychiatric:         Mood and Affect: Mood normal.         Behavior: Behavior normal.         Thought Content: Thought content normal.         Judgment: Judgment normal.        Result Review :      Common labs          10/23/2023    10:05 1/1/2024    12:05 4/23/2024    10:35   Common Labs   Glucose 138  144  125    BUN 13  13  12    Creatinine 0.77  0.84  0.84    Sodium 143  141  145    Potassium 4.2  3.2  4.2    Chloride " 105  104  107    Calcium 10.1  9.4  9.8    Total Protein 6.9   6.8    Albumin 5.1  4.7  4.8    Total Bilirubin 0.5  0.4  0.5    Alkaline Phosphatase 84  85  92    AST (SGOT) 19  21  18    ALT (SGPT) 28  25  23    WBC 8.40  11.64  7.12    Hemoglobin 14.6  14.6  14.4    Hematocrit 43.1  43.1  42.8    Platelets 338  242  289    Total Cholesterol 193   177    Triglycerides 171   143    HDL Cholesterol 58   55    LDL Cholesterol  105   97    Hemoglobin A1C 5.90   5.80          Current Outpatient Medications on File Prior to Visit   Medication Sig Dispense Refill    Accu-Chek FastClix Lancets misc Use to check blood sugar once daily 100 each 2    amLODIPine (NORVASC) 5 MG tablet TAKE ONE TABLET BY MOUTH DAILY 90 tablet 4    aspirin 81 MG chewable tablet Chew 1 tablet Daily.      atorvastatin (LIPITOR) 20 MG tablet TAKE 1 TABLET BY MOUTH DAILY 90 tablet 0    Cholecalciferol (VITAMIN D) 1000 UNITS tablet Take  by mouth.      Coenzyme Q10 (CO Q 10) 10 MG capsule Take  by mouth Daily.      dicyclomine (BENTYL) 10 MG capsule Take 1 capsule by mouth 3 (Three) Times a Day As Needed (Abdominal pain). 15 capsule 0    DULoxetine (CYMBALTA) 60 MG capsule TAKE ONE CAPSULE BY MOUTH DAILY 90 capsule 1    famotidine (PEPCID) 20 MG tablet Take 1 tablet by mouth 2 (Two) Times a Day As Needed (Abdominal discomfort). 15 tablet 0    fluticasone (FLONASE) 50 MCG/ACT nasal spray 2 sprays into the nostril(s) as directed by provider Daily. Administer 2 sprays in each nostril for each dose.      furosemide (LASIX) 20 MG tablet Take 1 tablet by mouth Daily As Needed (edema). 60 tablet 1    Multiple Vitamin (MULTIVITAMIN) capsule Take 1 capsule by mouth Daily.      olmesartan (BENICAR) 40 MG tablet TAKE 1 TABLET BY MOUTH DAILY 90 tablet 1    ondansetron ODT (ZOFRAN-ODT) 4 MG disintegrating tablet Place 1 tablet on the tongue Every 8 (Eight) Hours As Needed for Nausea or Vomiting. 15 tablet 0    Probiotic Product (PRO-BIOTIC BLEND) capsule Take  by  mouth Daily.      propranolol (INDERAL) 60 MG tablet TAKE ONE TABLET BY MOUTH TWICE A  tablet 3    RSVPreF3 Vac Recomb Adjuvanted (Arexvy) 120 MCG/0.5ML reconstituted suspension injection Inject  into the appropriate muscle as directed by prescriber. 1 each 0     No current facility-administered medications on file prior to visit.                Assessment and Plan     Diagnoses and all orders for this visit:    1. H/O epistaxis (Primary)        Patient Instructions   Hold Flonase and Nasocort. Recommend saline nasal spray twice a day. Continue pinching nose as needed for bleed. Follow-up with ENT as scheduled.            Follow Up     Return if symptoms worsen or fail to improve.  Patient was given instructions and counseling regarding her condition or for health maintenance advice. Please see specific information pulled into the AVS if appropriate.

## 2024-06-17 NOTE — PATIENT INSTRUCTIONS
Hold Flonase and Nasocort. Recommend saline nasal spray twice a day. Continue pinching nose as needed for bleed. Follow-up with ENT as scheduled.

## 2024-07-21 DIAGNOSIS — E78.00 HYPERCHOLESTEROLEMIA: ICD-10-CM

## 2024-07-22 RX ORDER — ATORVASTATIN CALCIUM 20 MG/1
20 TABLET, FILM COATED ORAL DAILY
Qty: 90 TABLET | Refills: 0 | Status: SHIPPED | OUTPATIENT
Start: 2024-07-22

## 2024-08-05 ENCOUNTER — OFFICE VISIT (OUTPATIENT)
Dept: INTERNAL MEDICINE | Facility: CLINIC | Age: 74
End: 2024-08-05
Payer: MEDICARE

## 2024-08-05 VITALS
SYSTOLIC BLOOD PRESSURE: 138 MMHG | WEIGHT: 174 LBS | HEIGHT: 66 IN | OXYGEN SATURATION: 98 % | DIASTOLIC BLOOD PRESSURE: 74 MMHG | HEART RATE: 63 BPM | BODY MASS INDEX: 27.97 KG/M2 | TEMPERATURE: 98.5 F

## 2024-08-05 DIAGNOSIS — R35.0 URINARY FREQUENCY: Primary | ICD-10-CM

## 2024-08-05 DIAGNOSIS — N39.0 URINARY TRACT INFECTION WITH HEMATURIA, SITE UNSPECIFIED: ICD-10-CM

## 2024-08-05 DIAGNOSIS — R31.9 URINARY TRACT INFECTION WITH HEMATURIA, SITE UNSPECIFIED: ICD-10-CM

## 2024-08-05 DIAGNOSIS — R82.90 ABNORMAL URINE FINDINGS: ICD-10-CM

## 2024-08-05 LAB
BILIRUB BLD-MCNC: ABNORMAL MG/DL
CLARITY, POC: ABNORMAL
COLOR UR: ABNORMAL
EXPIRATION DATE: ABNORMAL
GLUCOSE UR STRIP-MCNC: NEGATIVE MG/DL
KETONES UR QL: NEGATIVE
LEUKOCYTE EST, POC: ABNORMAL
Lab: ABNORMAL
NITRITE UR-MCNC: POSITIVE MG/ML
PH UR: 6.5 [PH] (ref 5–8)
PROT UR STRIP-MCNC: ABNORMAL MG/DL
RBC # UR STRIP: ABNORMAL /UL
SP GR UR: 1.02 (ref 1–1.03)
UROBILINOGEN UR QL: ABNORMAL

## 2024-08-05 PROCEDURE — 3078F DIAST BP <80 MM HG: CPT

## 2024-08-05 PROCEDURE — 1126F AMNT PAIN NOTED NONE PRSNT: CPT

## 2024-08-05 PROCEDURE — 3075F SYST BP GE 130 - 139MM HG: CPT

## 2024-08-05 PROCEDURE — 81003 URINALYSIS AUTO W/O SCOPE: CPT

## 2024-08-05 PROCEDURE — 99213 OFFICE O/P EST LOW 20 MIN: CPT

## 2024-08-05 RX ORDER — NITROFURANTOIN 25; 75 MG/1; MG/1
100 CAPSULE ORAL 2 TIMES DAILY
Qty: 10 CAPSULE | Refills: 0 | Status: SHIPPED | OUTPATIENT
Start: 2024-08-05 | End: 2024-08-10

## 2024-08-05 NOTE — PROGRESS NOTES
"        Chief Complaint  Urinary Frequency     Subjective:      History of Present Illness {CC  Problem List  Visit  Diagnosis   Encounters  Notes  Medications  Labs  Result Review Imaging  Media :23}     Jana Page presents to [facility] for:      History of Present Illness     Pt states her symptoms started a few days ago. Pt states she is having dysuria and frequency. Pt denies fever, back pain, urgency. Pt is not drinking a lot of water. Pt is not taking azo or any OTC medications.       I have reviewed patient's medical history, any new submitted information provided by patient or medical assistant and updated medical record.      Objective:      Physical Exam   Result Review  Data Reviewed:{ Labs  Result Review  Imaging  Med Tab  Media :23}     The following data was reviewed by: RACHELE Christopher on 08/05/2024    URINE DIPSTICK:  Lab Results - Last 18 Months   Lab Units 08/05/24  1355   COLOR UA  Dark Yellow   CLARITY UA  Cloudy*   SPECIFIC GRAVITY, URINE  1.025   PH, URINE  6.5   LEUKOCYTES UA  Moderate (2+)*   NITRITE UA  Positive*   PROTEIN UA mg/dL 2+*   UROBILINOGEN UA  0.2 E.U./dL   BILIRUBIN UA  Small (1+)*   BLOOD UA  2+*          Vital Signs:   /74 (BP Location: Left arm, Patient Position: Sitting, Cuff Size: Adult)   Pulse 63   Temp 98.5 °F (36.9 °C) (Oral)   Ht 167.6 cm (66\")   Wt 78.9 kg (174 lb)   SpO2 98%   BMI 28.08 kg/m²   Estimated body mass index is 28.08 kg/m² as calculated from the following:    Height as of this encounter: 167.6 cm (66\").    Weight as of this encounter: 78.9 kg (174 lb).        Requested Prescriptions     Signed Prescriptions Disp Refills    nitrofurantoin, macrocrystal-monohydrate, (Macrobid) 100 MG capsule 10 capsule 0     Sig: Take 1 capsule by mouth 2 (Two) Times a Day for 5 days.       Routine medications provided by this office will also be refilled via pharmacy request.       Current " Outpatient Medications:     Accu-Chek FastClix Lancets misc, Use to check blood sugar once daily, Disp: 100 each, Rfl: 2    amLODIPine (NORVASC) 5 MG tablet, TAKE ONE TABLET BY MOUTH DAILY, Disp: 90 tablet, Rfl: 4    atorvastatin (LIPITOR) 20 MG tablet, TAKE 1 TABLET BY MOUTH DAILY, Disp: 90 tablet, Rfl: 0    Cholecalciferol (VITAMIN D) 1000 UNITS tablet, Take  by mouth., Disp: , Rfl:     Coenzyme Q10 (CO Q 10) 10 MG capsule, Take  by mouth Daily., Disp: , Rfl:     DULoxetine (CYMBALTA) 60 MG capsule, TAKE ONE CAPSULE BY MOUTH DAILY, Disp: 90 capsule, Rfl: 1    fluticasone (FLONASE) 50 MCG/ACT nasal spray, 2 sprays into the nostril(s) as directed by provider Daily. Administer 2 sprays in each nostril for each dose., Disp: , Rfl:     Multiple Vitamin (MULTIVITAMIN) capsule, Take 1 capsule by mouth Daily., Disp: , Rfl:     olmesartan (BENICAR) 40 MG tablet, TAKE 1 TABLET BY MOUTH DAILY, Disp: 90 tablet, Rfl: 1    Probiotic Product (PRO-BIOTIC BLEND) capsule, Take  by mouth Daily., Disp: , Rfl:     propranolol (INDERAL) 60 MG tablet, TAKE ONE TABLET BY MOUTH TWICE A DAY, Disp: 180 tablet, Rfl: 3    RSVPreF3 Vac Recomb Adjuvanted (Arexvy) 120 MCG/0.5ML reconstituted suspension injection, Inject  into the appropriate muscle as directed by prescriber., Disp: 1 each, Rfl: 0    aspirin 81 MG chewable tablet, Chew 1 tablet Daily. (Patient not taking: Reported on 8/5/2024), Disp: , Rfl:     dicyclomine (BENTYL) 10 MG capsule, Take 1 capsule by mouth 3 (Three) Times a Day As Needed (Abdominal pain). (Patient not taking: Reported on 8/5/2024), Disp: 15 capsule, Rfl: 0    famotidine (PEPCID) 20 MG tablet, Take 1 tablet by mouth 2 (Two) Times a Day As Needed (Abdominal discomfort). (Patient not taking: Reported on 8/5/2024), Disp: 15 tablet, Rfl: 0    furosemide (LASIX) 20 MG tablet, Take 1 tablet by mouth Daily As Needed (edema). (Patient not taking: Reported on 8/5/2024), Disp: 60 tablet, Rfl: 1    ondansetron ODT (ZOFRAN-ODT)  4 MG disintegrating tablet, Place 1 tablet on the tongue Every 8 (Eight) Hours As Needed for Nausea or Vomiting. (Patient not taking: Reported on 8/5/2024), Disp: 15 tablet, Rfl: 0     Assessment and Plan:      Assessment and Plan {CC Problem List  Visit Diagnosis  ROS  Review (Popup)  Health Maintenance  Quality  BestPractice  Medications  SmartSets  SnapShot Encounters  Media :23}     Diagnoses and all orders for this visit:    1. Urinary frequency (Primary)  -     POCT urinalysis dipstick, automated    2. Urinary tract infection with hematuria, site unspecified  -     nitrofurantoin, macrocrystal-monohydrate, (Macrobid) 100 MG capsule; Take 1 capsule by mouth 2 (Two) Times a Day for 5 days.  Dispense: 10 capsule; Refill: 0             New Medications Ordered This Visit   Medications    nitrofurantoin, macrocrystal-monohydrate, (Macrobid) 100 MG capsule     Sig: Take 1 capsule by mouth 2 (Two) Times a Day for 5 days.     Dispense:  10 capsule     Refill:  0       Educated patient on dosing and side effects of Macrobid.  Will send for culture and change antibiotic if necessary.  Educated patient to increase fluids.  Educated patient that if she develops fever, severe flank pain, nausea and vomiting to be seen in the ER.  Patient verbalized understanding and is comfortable with the plan of care.    Follow Up {Instructions Charge Capture  Follow-up Communications :23}     No follow-ups on file.      Patient was given instructions and counseling regarding her condition or for health maintenance advice. Please see specific information pulled into the AVS if appropriate.    Jd disclaimer:   Much of this encounter note is an electronic transcription/translation of spoken language to printed text. The electronic translation of spoken language may permit erroneous, or at times, nonsensical words or phrases to be inadvertently transcribed; Although I have reviewed the note for such errors, some may still  exist.     Additional Patient Counseling:       There are no Patient Instructions on file for this visit.

## 2024-08-08 LAB
BACTERIA #/AREA URNS HPF: ABNORMAL /[HPF]
BACTERIA UR CULT: ABNORMAL
CASTS URNS QL MICRO: ABNORMAL /LPF
EPI CELLS #/AREA URNS HPF: ABNORMAL /HPF (ref 0–10)
OTHER ANTIBIOTIC SUSC ISLT: ABNORMAL
RBC #/AREA URNS HPF: >30 /HPF (ref 0–2)
WBC #/AREA URNS HPF: >30 /HPF (ref 0–5)

## 2024-08-26 ENCOUNTER — OFFICE VISIT (OUTPATIENT)
Dept: INTERNAL MEDICINE | Facility: CLINIC | Age: 74
End: 2024-08-26
Payer: MEDICARE

## 2024-08-26 VITALS
SYSTOLIC BLOOD PRESSURE: 132 MMHG | DIASTOLIC BLOOD PRESSURE: 94 MMHG | HEIGHT: 66 IN | BODY MASS INDEX: 27.48 KG/M2 | WEIGHT: 171 LBS | HEART RATE: 68 BPM | OXYGEN SATURATION: 96 %

## 2024-08-26 DIAGNOSIS — U07.1 COVID-19 VIRUS INFECTION: ICD-10-CM

## 2024-08-26 DIAGNOSIS — R73.03 PREDIABETES: ICD-10-CM

## 2024-08-26 DIAGNOSIS — F41.1 GENERALIZED ANXIETY DISORDER: Chronic | ICD-10-CM

## 2024-08-26 DIAGNOSIS — E78.2 MIXED HYPERLIPIDEMIA: Chronic | ICD-10-CM

## 2024-08-26 DIAGNOSIS — I10 ESSENTIAL HYPERTENSION: Primary | Chronic | ICD-10-CM

## 2024-08-26 PROCEDURE — 1159F MED LIST DOCD IN RCRD: CPT | Performed by: STUDENT IN AN ORGANIZED HEALTH CARE EDUCATION/TRAINING PROGRAM

## 2024-08-26 PROCEDURE — 1160F RVW MEDS BY RX/DR IN RCRD: CPT | Performed by: STUDENT IN AN ORGANIZED HEALTH CARE EDUCATION/TRAINING PROGRAM

## 2024-08-26 PROCEDURE — 99214 OFFICE O/P EST MOD 30 MIN: CPT | Performed by: STUDENT IN AN ORGANIZED HEALTH CARE EDUCATION/TRAINING PROGRAM

## 2024-08-26 PROCEDURE — 3080F DIAST BP >= 90 MM HG: CPT | Performed by: STUDENT IN AN ORGANIZED HEALTH CARE EDUCATION/TRAINING PROGRAM

## 2024-08-26 PROCEDURE — 3075F SYST BP GE 130 - 139MM HG: CPT | Performed by: STUDENT IN AN ORGANIZED HEALTH CARE EDUCATION/TRAINING PROGRAM

## 2024-08-26 PROCEDURE — 1126F AMNT PAIN NOTED NONE PRSNT: CPT | Performed by: STUDENT IN AN ORGANIZED HEALTH CARE EDUCATION/TRAINING PROGRAM

## 2024-08-26 NOTE — PROGRESS NOTES
"Chief Complaint  Exposure To Known Illness, Fatigue, Sore Throat, Nasal Congestion, and Cough (Minor )    Subjective        Jana Page presents to [facility]  History of Present Illness  This is a 73-year-old female here to establish care with chronic medical conditions of hypertension, hyperlipidemia, prediabetes, liver steatosis, generalized anxiety, OA.    Acute concerns: She recently traveled to [facility] and on Sunday started having symptoms of congestion and mild nonproductive cough. She tested positive for COVID today with an at home test.  She reports some fatigue but overall symptoms have been mild and she denies any fever at home.  She is unsure about sick contacts but as noted had recent travel.  Hypertension: On 40 mg of losartan and 5 mg amlodipine 60 mg propranolol  Generalized anxiety: On 60 mg duloxetine.  Reports overall mood is good.  Hyperlipidemia: Currently taking 20 mg atorvastatin  Denies side effects from any of her medications      Objective   Vital Signs:  /94 (BP Location: Left arm, Patient Position: Sitting, Cuff Size: Adult)   Pulse 68   Ht 167.6 cm (66\")   Wt 77.6 kg (171 lb)   SpO2 96%   BMI 27.60 kg/m²   Estimated body mass index is 27.6 kg/m² as calculated from the following:    Height as of this encounter: 167.6 cm (66\").    Weight as of this encounter: 77.6 kg (171 lb).          Physical Exam  Vitals and nursing note reviewed.   Constitutional:       General: She is not in acute distress.     Appearance: Normal appearance.   HENT:      Head:      Comments: Wearing a mask  Cardiovascular:      Rate and Rhythm: Normal rate and regular rhythm.      Heart sounds: No murmur heard.  Pulmonary:      Effort: Pulmonary effort is normal.      Breath sounds: Normal breath sounds.   Skin:     General: Skin is warm and dry.   Neurological:      Mental Status: She is alert.   Psychiatric:         Mood and Affect: Mood normal.         Judgment: " Judgment normal.        Result Review :  The following data was reviewed by: Neisha Mccabe MD on 08/26/2024:  Common labs          10/23/2023    10:05 1/1/2024    12:05 4/23/2024    10:35   Common Labs   Glucose 138  144  125    BUN 13  13  12    Creatinine 0.77  0.84  0.84    Sodium 143  141  145    Potassium 4.2  3.2  4.2    Chloride 105  104  107    Calcium 10.1  9.4  9.8    Total Protein 6.9   6.8    Albumin 5.1  4.7  4.8    Total Bilirubin 0.5  0.4  0.5    Alkaline Phosphatase 84  85  92    AST (SGOT) 19  21  18    ALT (SGPT) 28  25  23    WBC 8.40  11.64  7.12    Hemoglobin 14.6  14.6  14.4    Hematocrit 43.1  43.1  42.8    Platelets 338  242  289    Total Cholesterol 193   177    Triglycerides 171   143    HDL Cholesterol 58   55    LDL Cholesterol  105   97    Hemoglobin A1C 5.90   5.80                Assessment and Plan   Diagnoses and all orders for this visit:    1. Essential hypertension (Primary)  Assessment & Plan:  Hypertension is stable and controlled  Continue current treatment regimen.  Regular aerobic exercise.  Blood pressure will be reassessed in 6 months.      2. Generalized anxiety disorder    3. Mixed hyperlipidemia    4. COVID-19 virus infection    We reviewed the recent CDC guidelines regarding ideal isolation of 5 days for COVID-19.  Discussed this may be extended depending on the development of new fevers or worsening symptoms.    Recommended over-the-counter treatment for congestion and reported mild symptoms she is having but encouraged her to call for new or worsening symptoms.  Discussed the risk benefits and alternatives of Paxlovid, well as interactions with her current medications, patient would like to defer treatment with Paxlovid at this time.   Discussed timing of COVID booster vaccine and annual flu vaccine to wait a minimum several weeks after symptoms have resolved before obtaining these.    Patient voiced understanding with this plan.  Further information regarding this  included in after visit summary.       Follow Up   Return in about 6 months (around 2/26/2025) for Medicare Wellness.  Patient was given instructions and counseling regarding her condition or for health maintenance advice. Please see specific information pulled into the AVS if appropriate.                No

## 2024-08-26 NOTE — PATIENT INSTRUCTIONS
Clinical References    Quarantine and Isolation  Quarantine and isolation are ways to protect the public from diseases that could:  Harm a person's health.  Spread easily (very contagious).  Isolation is when you are sick and have to stay home and away from healthy people. Quarantine is when you have to stay home for a certain amount of time after being around a sick person. This is to see if you become sick.  What diseases do I need to quarantine or isolate for?  You may need to quarantine or isolate if you have been diagnosed or around someone with:  Cholera.  Diphtheria.  Infectious TB (tuberculosis).  Plague.  Smallpox.  Yellow fever.  Viral hemorrhagic fevers, such as [place], Ebola, and [place].  Severe acute respiratory syndromes, such as COVID-19.  Flu that can cause a pandemic.  Measles.  When should I quarantine?  You should quarantine when you've been in close contact with someone who has, or is thought to have, a contagious disease. Close contact means you've been less than 6 ft (1.8 m) away from the person for 15 minutes or more within a 24-hour period.  When should I isolate?  You should isolate when:  You are sick with a contagious disease.  You test positive for a contagious disease, even if you don't have symptoms.  You are sick and think you have a contagious disease.  Get tested if you think you have a contagious disease.  If your test results are negative, you can stop isolation. Sometimes, two negative tests are needed.  If your test results are positive, isolate as told by your health care provider or health officials.  Follow these instructions at home:  Medicines  Take over-the-counter and prescription medicines as told by your provider.  If you were prescribed antibiotics, take them as told by your provider. Do not stop using the antibiotic even if you start to feel better.  Stay up to date on your vaccines. Get vaccines and booster shots as recommended.  Lifestyle  When you're in  quarantine or isolation:  Wear a high-quality and well-fitted mask if you must be around others at home or in public.  Do not get close to people who may get very sick.  Use a bathroom that you don't have to share with others, if you can.  Do not share personal items like cups, towels, and eating utensils.  Practice good hygiene. Wash your hands often.  Improve the air flow in your home by opening a window or door. This may stop the disease from spreading to others.     General instructions  Do not travel if you are in quarantine or isolation. Travel only when your provider or health officials say it's okay.  Call your provider or health department if you need advice on how to care for yourself.  Talk to your provider if you are immunocompromised. This means your body cannot fight infections easily. Your body may not respond as well to vaccines.  If you are sick, closely watch your symptoms. Follow instructions from your provider. You may be told to rest, drink fluids, and take medicine.  Follow guidelines for quarantine and isolation, especially if you are in a place where diseases can spread easily and quickly. These places include jails, homeless shelters, and cruise ships.  Where to find more information  Centers for Disease Control and Prevention (CDC): cdc.gov  This information is not intended to replace advice given to you by your health care provider. Make sure you discuss any questions you have with your health care provider.  Document Revised: 12/26/2023 Document Reviewed: 09/01/2023  Elsevier Patient Education © 2024 Elsevier Inc.     Viral Respiratory Infection  A respiratory infection is an illness that affects part of the respiratory system, such as the lungs, nose, or throat. A respiratory infection that is caused by a virus is called a viral respiratory infection.  Common types of viral respiratory infections include:  A cold.  The flu (influenza).  A respiratory syncytial virus (RSV)  infection.  What are the causes?  This condition is caused by a virus. The virus may spread through contact with droplets or direct contact with infected people or their mucus or secretions. The virus may spread from person to person (is contagious).  What are the signs or symptoms?  Symptoms of this condition include:  A stuffy or runny nose.  A sore throat or cough.  Shortness of breath or difficulty breathing.  Yellow or green mucus (sputum).  Other symptoms may include:  A fever.  Sweating or chills.  Fatigue.  Achy muscles.  A headache.  How is this diagnosed?  This condition may be diagnosed based on:  Your symptoms.  A physical exam.  Testing of secretions from the nose or throat.  Chest X-ray.  How is this treated?  This condition may be treated with medicines, such as:  Antiviral medicine. This may shorten the length of time a person has symptoms.  Expectorants. These make it easier to cough up mucus.  Decongestant nasal sprays.  Acetaminophen or NSAIDs, such as ibuprofen, to relieve fever and pain.  Antibiotic medicines are not prescribed for viral infections.This is because antibiotics are designed to kill bacteria. They do not kill viruses.  Follow these instructions at home:  Managing pain and congestion  Take over-the-counter and prescription medicines only as told by your health care provider.  If you have a sore throat, gargle with a mixture of salt and water 3-4 times a day or as needed. To make salt water, completely dissolve ½-1 tsp (3-6 g) of salt in 1 cup (237 mL) of warm water.  Use nose drops made from salt water to ease congestion and soften raw skin around your nose.  Take 2 tsp (10 mL) of honey at bedtime to lessen coughing at night.  Do not give honey to children who are younger than 1 year.  Drink enough fluid to keep your urine pale yellow. This helps prevent dehydration and helps loosen up mucus.  General instructions  Rest as much as possible.  Do not drink alcohol.  Do not use any  products that contain nicotine or tobacco. These products include cigarettes, chewing tobacco, and vaping devices, such as e-cigarettes. If you need help quitting, ask your health care provider.  Keep all follow-up visits. This is important.  How is this prevented?     Get an annual flu shot. You may get the flu shot in late summer, fall, or winter. Ask your health care provider when you should get your flu shot.  Avoid spreading your infection to other people. If you are sick:  Wash your hands with soap and water often, especially after you cough or sneeze. Wash for at least 20 seconds. If soap and water are not available, use alcohol-based hand .  Cover your mouth when you cough. Cover your nose and mouth when you sneeze.  Do not share cups or eating utensils.  Clean commonly used objects often. Clean commonly touched surfaces.  Stay home from work or school as told by your health care provider.  Avoid contact with people who are sick during cold and flu season. This is generally fall and winter.  Contact a health care provider if:  Your symptoms last for 10 days or longer.  Your symptoms get worse over time.  You have severe sinus pain in your face or forehead.  The glands in your jaw or neck become very swollen.  You have shortness of breath.  Get help right away if you:  Feel pain or pressure in your chest.  Have trouble breathing.  Faint or feel like you will faint.  Have severe and persistent vomiting.  Feel confused or disoriented.  These symptoms may represent a serious problem that is an emergency. Do not wait to see if the symptoms will go away. Get medical help right away. Call your local emergency services (911 in the [place] Do not drive yourself to the hospital.  Summary  A respiratory infection is an illness that affects part of the respiratory system, such as the lungs, nose, or throat. A respiratory infection that is caused by a virus is called a viral respiratory infection.  Common types  of viral respiratory infections include a cold, influenza, and respiratory syncytial virus (RSV) infection.  Symptoms of this condition include a stuffy or runny nose, cough, fatigue, achy muscles, sore throat, and fevers or chills.  Antibiotic medicines are not prescribed for viral infections. This is because antibiotics are designed to kill bacteria. They are not effective against viruses.  This information is not intended to replace advice given to you by your health care provider. Make sure you discuss any questions you have with your health care provider.  Document Revised: 03/24/2022 Document Reviewed: 03/24/2022  Elsevier Patient Education © 2024 Elsevier Inc.

## 2024-10-22 DIAGNOSIS — I10 BENIGN ESSENTIAL HYPERTENSION: ICD-10-CM

## 2024-10-22 RX ORDER — OLMESARTAN MEDOXOMIL 40 MG/1
40 TABLET ORAL DAILY
Qty: 90 TABLET | Refills: 1 | Status: SHIPPED | OUTPATIENT
Start: 2024-10-22

## 2024-10-22 NOTE — TELEPHONE ENCOUNTER
Caller: Jana Page    Relationship: Self    Best call back number: [phone]    Requested Prescriptions:   Requested Prescriptions     Pending Prescriptions Disp Refills    olmesartan (BENICAR) 40 MG tablet 90 tablet 1     Sig: Take 1 tablet by mouth Daily.        Pharmacy where request should be sent: [facility] [id number] [place], [place] - [facility] [address]  - [phone] [facility] [phone] FX     Last office visit with prescribing clinician: 8/26/2024   Last telemedicine visit with prescribing clinician: Visit date not found   Next office visit with prescribing clinician: 2/10/2025     Additional details provided by patient: OUT OF MEDICATION     Does the patient have less than a 3 day supply:  [x] Yes  [] No      Mignon Marin Rep   10/22/24 09:18 EDT

## 2024-10-28 DIAGNOSIS — E78.00 HYPERCHOLESTEROLEMIA: ICD-10-CM

## 2024-10-28 RX ORDER — ATORVASTATIN CALCIUM 20 MG/1
20 TABLET, FILM COATED ORAL DAILY
Qty: 90 TABLET | Refills: 1 | Status: SHIPPED | OUTPATIENT
Start: 2024-10-28

## 2024-10-28 NOTE — TELEPHONE ENCOUNTER
Caller: Page Jana VICKERS    Relationship: Self    Best call back number: [phone] (     Requested Prescriptions:   Requested Prescriptions     Pending Prescriptions Disp Refills    atorvastatin (LIPITOR) 20 MG tablet 90 tablet 0     Sig: Take 1 tablet by mouth Daily.        Pharmacy where request should be sent: [facility] [id number] [place] [address] [address] AT [facility]  - [phone]  - [phone] FX     Last office visit with prescribing clinician: 8/26/2024   Last telemedicine visit with prescribing clinician: Visit date not found   Next office visit with prescribing clinician: 2/10/2025     Additional details provided by patient: PATIENT IS OUT OF MEDICATION     Does the patient have less than a 3 day supply:  [x] Yes  [] No      Mignon Lobo Rep   10/28/24 13:20 EDT

## 2024-11-18 DIAGNOSIS — R42 LIGHTHEADEDNESS: ICD-10-CM

## 2024-11-18 DIAGNOSIS — F41.9 ANXIETY: ICD-10-CM

## 2024-11-18 RX ORDER — DULOXETIN HYDROCHLORIDE 60 MG/1
60 CAPSULE, DELAYED RELEASE ORAL DAILY
Qty: 90 CAPSULE | Refills: 1 | Status: SHIPPED | OUTPATIENT
Start: 2024-11-18

## 2024-11-18 NOTE — TELEPHONE ENCOUNTER
Caller: Jana Page    Relationship: Self    Best call back number: [phone]     Requested Prescriptions:   Requested Prescriptions     Pending Prescriptions Disp Refills    DULoxetine (CYMBALTA) 60 MG capsule 90 capsule 1     Sig: Take 1 capsule by mouth Daily.        Pharmacy where request should be sent: [facility] [id number] [place], [place] - [facility] [address]  - [phone] [facility] [phone] FX     Last office visit with prescribing clinician: 8/26/2024   Last telemedicine visit with prescribing clinician: Visit date not found   Next office visit with prescribing clinician: 2/10/2025     Additional details provided by patient: PLEASE SEND 90 DAY SUPPLY WITH REFILLS     Does the patient have less than a 3 day supply:  [x] Yes  [] No    Would you like a call back once the refill request has been completed: [] Yes [x] No    If the office needs to give you a call back, can they leave a voicemail: [x] Yes [] No    Mignon Mahoney Rep   11/18/24 08:26 EST

## 2024-12-13 ENCOUNTER — TELEPHONE (OUTPATIENT)
Dept: CARDIOLOGY | Facility: CLINIC | Age: 74
End: 2024-12-13
Payer: MEDICARE

## 2024-12-13 DIAGNOSIS — Q23.81 BICUSPID AORTIC VALVE: Primary | ICD-10-CM

## 2024-12-13 NOTE — TELEPHONE ENCOUNTER
----- Message from Tamra LANG sent at 2024  3:56 PM EST -----  Regarding: need updated echo order  Hi Dr. Mcmahon,     The patient has an upcoming echo next week.   The order  yesterday.  Please add an order for the upcoming echo    Thanks so much!

## 2024-12-16 ENCOUNTER — OFFICE VISIT (OUTPATIENT)
Dept: INTERNAL MEDICINE | Facility: CLINIC | Age: 74
End: 2024-12-16
Payer: MEDICARE

## 2024-12-16 VITALS
DIASTOLIC BLOOD PRESSURE: 80 MMHG | BODY MASS INDEX: 28.3 KG/M2 | WEIGHT: 176.1 LBS | HEIGHT: 66 IN | HEART RATE: 66 BPM | SYSTOLIC BLOOD PRESSURE: 122 MMHG | RESPIRATION RATE: 18 BRPM | OXYGEN SATURATION: 97 %

## 2024-12-16 DIAGNOSIS — G44.209 TENSION HEADACHE: Primary | ICD-10-CM

## 2024-12-16 PROCEDURE — 3074F SYST BP LT 130 MM HG: CPT | Performed by: STUDENT IN AN ORGANIZED HEALTH CARE EDUCATION/TRAINING PROGRAM

## 2024-12-16 PROCEDURE — 1126F AMNT PAIN NOTED NONE PRSNT: CPT | Performed by: STUDENT IN AN ORGANIZED HEALTH CARE EDUCATION/TRAINING PROGRAM

## 2024-12-16 PROCEDURE — 99213 OFFICE O/P EST LOW 20 MIN: CPT | Performed by: STUDENT IN AN ORGANIZED HEALTH CARE EDUCATION/TRAINING PROGRAM

## 2024-12-16 PROCEDURE — 3079F DIAST BP 80-89 MM HG: CPT | Performed by: STUDENT IN AN ORGANIZED HEALTH CARE EDUCATION/TRAINING PROGRAM

## 2024-12-16 NOTE — Clinical Note
Good afternoon, Keeping you in the loop about this patient.  She may bring up headaches to you in office this week.  It has been about 2 years since her last carotid ultrasound.  I do not think the headaches are necessarily related but she did bring up a concern about this given carotid disease in her mother. Thanks, Neisha Mccabe

## 2024-12-16 NOTE — PROGRESS NOTES
"Chief Complaint  Headache (Off and on x3 weeks, feels different/OTC excederine)    Subjective        Jana Page presents to [facility]  History of Present Illness  This a 74-year-old female here for headache. She has had headaches before but reports these have been worse in the past several weeks.  This has been occurring off and on for several weeks she has been taking over-the-counter excedrin has been helpful.  She does sometimes have dizziness. Headaches will generally occur in the afternoons and will last 2 hours.  He does have some chronic neck pain which she has reports the headaches are frontal in nature.  They do get better when she lays down for bed she sometimes has nausea but no vomiting.  She denies visual disturbances, slurred speech or weakness numbness.  She has a history of cataracts and saw Dr. Mena in September without changes.  Blood pressure is well-controlled in office today and and at home.  Reports she can notice when her blood pressure is elevated.  She is worried about the possibility of carotid artery disease.    Objective   Vital Signs:  /80 (BP Location: Left arm, Patient Position: Sitting, Cuff Size: Adult)   Pulse 66   Resp 18   Ht 167.6 cm (65.98\")   Wt 79.9 kg (176 lb 1.6 oz)   SpO2 97%   BMI 28.44 kg/m²   Estimated body mass index is 28.44 kg/m² as calculated from the following:    Height as of this encounter: 167.6 cm (65.98\").    Weight as of this encounter: 79.9 kg (176 lb 1.6 oz).          Physical Exam  Vitals and nursing note reviewed.   Constitutional:       General: She is not in acute distress.     Appearance: Normal appearance. She is not ill-appearing or toxic-appearing.   Cardiovascular:      Rate and Rhythm: Normal rate and regular rhythm.   Pulmonary:      Effort: Pulmonary effort is normal.   Skin:     General: Skin is warm and dry.   Neurological:      Mental Status: She is alert and oriented to person, place, and " time.      Comments: Face is symmetric  Speech is coherent  chronic left eye changes   Psychiatric:         Mood and Affect: Mood normal.         Thought Content: Thought content normal.         Judgment: Judgment normal.        Result Review :      Data reviewed : Radiologic studies Carotid artery ultrasound January 2023 MRI brain 2022 and CT head in 2017           Assessment and Plan   Diagnoses and all orders for this visit:    1. Tension headache (Primary)    This point in time there are no alarm features of secondary headache and are readily identifiable.  It has been about 2 years since her carotid ultrasound.  She did have MRI of the brain 2 years ago showed chronic ischemic changes but was chronic microhemorrhages.  She is to advise if the headaches are persistent nature or if she develop new features regarding these.         Follow Up   Return in about 8 weeks (around 2/10/2025) for Medicare Wellness, Next scheduled follow up.  Patient was given instructions and counseling regarding her condition or for health maintenance advice. Please see specific information pulled into the AVS if appropriate.

## 2024-12-18 ENCOUNTER — HOSPITAL ENCOUNTER (OUTPATIENT)
Dept: CARDIOLOGY | Facility: HOSPITAL | Age: 74
Discharge: HOME | End: 2024-12-18
Admitting: INTERNAL MEDICINE
Payer: MEDICARE

## 2024-12-18 ENCOUNTER — OFFICE VISIT (OUTPATIENT)
Dept: CARDIOLOGY | Facility: CLINIC | Age: 74
End: 2024-12-18
Payer: MEDICARE

## 2024-12-18 VITALS
WEIGHT: 176 LBS | SYSTOLIC BLOOD PRESSURE: 130 MMHG | BODY MASS INDEX: 29.32 KG/M2 | HEIGHT: 65 IN | DIASTOLIC BLOOD PRESSURE: 78 MMHG

## 2024-12-18 VITALS
OXYGEN SATURATION: 97 % | HEIGHT: 65 IN | WEIGHT: 177 LBS | DIASTOLIC BLOOD PRESSURE: 78 MMHG | BODY MASS INDEX: 29.49 KG/M2 | HEART RATE: 64 BPM | SYSTOLIC BLOOD PRESSURE: 130 MMHG

## 2024-12-18 DIAGNOSIS — I25.10 CORONARY ARTERY CALCIFICATION: Primary | ICD-10-CM

## 2024-12-18 DIAGNOSIS — I77.810 ASCENDING AORTA DILATION: ICD-10-CM

## 2024-12-18 DIAGNOSIS — I65.23 CAROTID ARTERY PLAQUE, BILATERAL: ICD-10-CM

## 2024-12-18 DIAGNOSIS — I10 ESSENTIAL HYPERTENSION: Chronic | ICD-10-CM

## 2024-12-18 DIAGNOSIS — I44.0 FIRST DEGREE AV BLOCK: ICD-10-CM

## 2024-12-18 DIAGNOSIS — Q23.81 BICUSPID AORTIC VALVE: ICD-10-CM

## 2024-12-18 DIAGNOSIS — E78.2 MIXED HYPERLIPIDEMIA: Chronic | ICD-10-CM

## 2024-12-18 LAB
AORTIC ARCH: 3 CM
AORTIC DIMENSIONLESS INDEX: 0.8 (DI)
ASCENDING AORTA: 3.9 CM
BH CV ECHO MEAS - ACS: 1.99 CM
BH CV ECHO MEAS - AI P1/2T: 522.7 MSEC
BH CV ECHO MEAS - AO MAX PG: 7 MMHG
BH CV ECHO MEAS - AO MEAN PG: 4 MMHG
BH CV ECHO MEAS - AO ROOT DIAM: 3.6 CM
BH CV ECHO MEAS - AO V2 MAX: 132 CM/SEC
BH CV ECHO MEAS - AO V2 VTI: 23 CM
BH CV ECHO MEAS - AVA(I,D): 2.6 CM2
BH CV ECHO MEAS - EDV(CUBED): 97.3 ML
BH CV ECHO MEAS - EDV(MOD-SP2): 64 ML
BH CV ECHO MEAS - EDV(MOD-SP4): 57 ML
BH CV ECHO MEAS - EF(MOD-BP): 60.1 %
BH CV ECHO MEAS - EF(MOD-SP2): 59.4 %
BH CV ECHO MEAS - EF(MOD-SP4): 59.6 %
BH CV ECHO MEAS - ESV(CUBED): 20.7 ML
BH CV ECHO MEAS - ESV(MOD-SP2): 26 ML
BH CV ECHO MEAS - ESV(MOD-SP4): 23 ML
BH CV ECHO MEAS - FS: 40.3 %
BH CV ECHO MEAS - IVS/LVPW: 1 CM
BH CV ECHO MEAS - IVSD: 1 CM
BH CV ECHO MEAS - LAT PEAK E' VEL: 6.2 CM/SEC
BH CV ECHO MEAS - LV DIASTOLIC VOL/BSA (35-75): 30.4 CM2
BH CV ECHO MEAS - LV MASS(C)D: 158.8 GRAMS
BH CV ECHO MEAS - LV MAX PG: 4.2 MMHG
BH CV ECHO MEAS - LV MEAN PG: 2 MMHG
BH CV ECHO MEAS - LV SYSTOLIC VOL/BSA (12-30): 12.3 CM2
BH CV ECHO MEAS - LV V1 MAX: 103 CM/SEC
BH CV ECHO MEAS - LV V1 VTI: 19.3 CM
BH CV ECHO MEAS - LVIDD: 4.6 CM
BH CV ECHO MEAS - LVIDS: 2.7 CM
BH CV ECHO MEAS - LVOT AREA: 3.1 CM2
BH CV ECHO MEAS - LVOT DIAM: 1.98 CM
BH CV ECHO MEAS - LVPWD: 1 CM
BH CV ECHO MEAS - MED PEAK E' VEL: 3.7 CM/SEC
BH CV ECHO MEAS - MR MAX PG: 105.2 MMHG
BH CV ECHO MEAS - MR MAX VEL: 512.9 CM/SEC
BH CV ECHO MEAS - MV A DUR: 0.12 SEC
BH CV ECHO MEAS - MV A MAX VEL: 87.5 CM/SEC
BH CV ECHO MEAS - MV DEC SLOPE: 574.9 CM/SEC2
BH CV ECHO MEAS - MV DEC TIME: 0.1 SEC
BH CV ECHO MEAS - MV E MAX VEL: 54.2 CM/SEC
BH CV ECHO MEAS - MV E/A: 0.62
BH CV ECHO MEAS - MV MAX PG: 3.6 MMHG
BH CV ECHO MEAS - MV MEAN PG: 1.57 MMHG
BH CV ECHO MEAS - MV P1/2T: 33.2 MSEC
BH CV ECHO MEAS - MV V2 VTI: 18 CM
BH CV ECHO MEAS - MVA(P1/2T): 6.6 CM2
BH CV ECHO MEAS - MVA(VTI): 3.3 CM2
BH CV ECHO MEAS - PA ACC TIME: 0.07 SEC
BH CV ECHO MEAS - PA V2 MAX: 118.1 CM/SEC
BH CV ECHO MEAS - PI END-D VEL: 99.1 CM/SEC
BH CV ECHO MEAS - PULM DIAS VEL: 43.2 CM/SEC
BH CV ECHO MEAS - PULM S/D: 1.63
BH CV ECHO MEAS - PULM SYS VEL: 70.6 CM/SEC
BH CV ECHO MEAS - QP/QS: 1.23
BH CV ECHO MEAS - RAP SYSTOLE: 3 MMHG
BH CV ECHO MEAS - RV MAX PG: 1.61 MMHG
BH CV ECHO MEAS - RV V1 MAX: 63.4 CM/SEC
BH CV ECHO MEAS - RV V1 VTI: 13.9 CM
BH CV ECHO MEAS - RVOT DIAM: 2.6 CM
BH CV ECHO MEAS - RVSP: 43 MMHG
BH CV ECHO MEAS - SUP REN AO DIAM: 2.4 CM
BH CV ECHO MEAS - SV(LVOT): 59.2 ML
BH CV ECHO MEAS - SV(MOD-SP2): 38 ML
BH CV ECHO MEAS - SV(MOD-SP4): 34 ML
BH CV ECHO MEAS - SV(RVOT): 72.6 ML
BH CV ECHO MEAS - SVI(LVOT): 31.6 ML/M2
BH CV ECHO MEAS - SVI(MOD-SP2): 20.3 ML/M2
BH CV ECHO MEAS - SVI(MOD-SP4): 18.1 ML/M2
BH CV ECHO MEAS - TAPSE (>1.6): 2.7 CM
BH CV ECHO MEAS - TR MAX PG: 39.5 MMHG
BH CV ECHO MEAS - TR MAX VEL: 314.1 CM/SEC
BH CV ECHO MEASUREMENTS AVERAGE E/E' RATIO: 10.95
BH CV XLRA - RV BASE: 3.3 CM
BH CV XLRA - RV LENGTH: 6.5 CM
BH CV XLRA - RV MID: 2.49 CM
BH CV XLRA - TDI S': 14.8 CM/SEC
LEFT ATRIUM VOLUME INDEX: 39 ML/M2
SINUS: 3.6 CM
STJ: 3.3 CM

## 2024-12-18 PROCEDURE — 93000 ELECTROCARDIOGRAM COMPLETE: CPT | Performed by: NURSE PRACTITIONER

## 2024-12-18 PROCEDURE — 1160F RVW MEDS BY RX/DR IN RCRD: CPT | Performed by: NURSE PRACTITIONER

## 2024-12-18 PROCEDURE — 3078F DIAST BP <80 MM HG: CPT | Performed by: NURSE PRACTITIONER

## 2024-12-18 PROCEDURE — 93306 TTE W/DOPPLER COMPLETE: CPT

## 2024-12-18 PROCEDURE — 1159F MED LIST DOCD IN RCRD: CPT | Performed by: NURSE PRACTITIONER

## 2024-12-18 PROCEDURE — 99214 OFFICE O/P EST MOD 30 MIN: CPT | Performed by: NURSE PRACTITIONER

## 2024-12-18 PROCEDURE — 3075F SYST BP GE 130 - 139MM HG: CPT | Performed by: NURSE PRACTITIONER

## 2024-12-18 NOTE — PROGRESS NOTES
Date of Office Visit: 2024  Encounter Provider: RACHELE Ko  Place of Service: [facility] CARDIOLOGY  Patient Name: Jana Page  :1950  Primary Cardiologist: Dr. Jd Mcmahon    Chief Complaint   Patient presents with    Annual Exam   :     HPI: Jana Page is a 74 y.o. female who presents today for cardiac follow-up visit.  I reviewed her medical records.    She has known hypertension and hyperlipidemia.      In 2017, she was diagnosed with coronary artery calcification per CT calcium score of 659.  Myocardial perfusion study was normal and echocardiogram stable.    In 2021, myocardial perfusion study was normal.  Echocardiogram showed EF normal, possible bicuspid aortic valve, trace to mild aortic regurgitation, trace to mild MR, and mild TR.  Holter monitor showed normal sinus rhythm, average heart rate 70, 50% PACs with the longest 13 beats in duration, rare ectopy, first-degree AV block, and dizziness had no correlations.     In 2023, carotid duplex showed bilateral plaque without significant stenosis.  In 2023, myocardial perfusion study was normal.  Echocardiogram revealed LVEF 61%, grade 1 diastolic dysfunction, mild aortic valve calcification, trace aortic regurgitation/mitral regurgitation, mild tricuspid regurgitation, and aorta measured 4.0 cm.    She presents today for a follow-up visit.  We discussed the echocardiogram results with her during the office visit.  She said she cannot walk very far because of back issues.  If she were to overexert, she may experience some dyspnea.  On a rare occasion, she may experience dizziness.  She has been having headaches.  She denies chest pain, PND, orthopnea, cough, edema, palpitations, syncope, or bleeding.  Blood pressure and heart rate are normal.      Past Medical History:   Diagnosis Date    Anxiety     Aortic atherosclerosis     Bicuspid aortic valve 2022     Carotid artery plaque, bilateral 2023    DDD (degenerative disc disease), cervical     Diverticulitis of large intestine     Diverticulosis of intestine     Fibrocystic breast changes     Hyperlipidemia     Hypertension     Impaired fasting blood sugar     Lateral epicondylitis of right elbow     Lightheadedness 02/10/2017    Lipoma of left lower extremity     New daily persistent headache 2017    Osteoarthritis of knee     Osteopenia     Pancreatic cyst     NO CHANGE IN  FROM     Post-menopausal     Scoliosis     Steatosis of liver     Uterine leiomyoma        Past Surgical History:   Procedure Laterality Date    COLONOSCOPY  2011        COLONOSCOPY N/A 2022    Procedure: COLONOSCOPY TO CECUM AND TERMINAL ILEUM;  Surgeon: Jay Contreras MD;  Location: [facility] ENDOSCOPY;  Service: Gastroenterology;  Laterality: N/A;  SCREENING  DIVERTICULOSIS, INTERNAL HEMORRHOIDS       Social History     Socioeconomic History    Marital status:     Number of children: 2   Tobacco Use    Smoking status: Former     Current packs/day: 0.00     Average packs/day: 1 pack/day for 10.0 years (10.0 ttl pk-yrs)     Types: Cigarettes     Start date:      Quit date:      Years since quittin.0    Smokeless tobacco: Never   Vaping Use    Vaping status: Never Used   Substance and Sexual Activity    Alcohol use: Yes     Comment: social    Drug use: Defer    Sexual activity: Defer       Family History   Problem Relation Age of Onset    Hypertension Mother     Osteoarthritis Mother     Breast cancer Mother         in situ    Stroke Mother     Diabetes Mother     Atrial fibrillation Mother     Heart attack Father 38    Coronary artery disease Father         CABG    Colon polyps Sister        The following portion of the patient's history were reviewed and updated as appropriate: past medical history, past surgical history, past social history, past family history, allergies, current  medications, and problem list.    Review of Systems   Constitutional: Negative.   Cardiovascular:  Positive for dyspnea on exertion (overexertion).   Respiratory: Negative.     Musculoskeletal:  Positive for back pain.   Neurological:  Positive for light-headedness.       Allergies   Allergen Reactions    Amoxicillin-Pot Clavulanate Diarrhea    Ampicillin GI Intolerance         Current Outpatient Medications:     Accu-Chek FastClix Lancets misc, Use to check blood sugar once daily, Disp: 100 each, Rfl: 2    amLODIPine (NORVASC) 5 MG tablet, TAKE ONE TABLET BY MOUTH DAILY, Disp: 90 tablet, Rfl: 4    aspirin 81 MG chewable tablet, Chew 1 tablet Daily., Disp: , Rfl:     atorvastatin (LIPITOR) 20 MG tablet, Take 1 tablet by mouth Daily., Disp: 90 tablet, Rfl: 1    Cholecalciferol (VITAMIN D) 1000 UNITS tablet, Take  by mouth., Disp: , Rfl:     Coenzyme Q10 (CO Q 10) 10 MG capsule, Take  by mouth Daily., Disp: , Rfl:     dicyclomine (BENTYL) 10 MG capsule, Take 1 capsule by mouth 3 (Three) Times a Day As Needed (Abdominal pain)., Disp: 15 capsule, Rfl: 0    DULoxetine (CYMBALTA) 60 MG capsule, Take 1 capsule by mouth Daily., Disp: 90 capsule, Rfl: 1    fluticasone (FLONASE) 50 MCG/ACT nasal spray, Administer 2 sprays into the nostril(s) as directed by provider Daily. Administer 2 sprays in each nostril for each dose., Disp: , Rfl:     furosemide (LASIX) 20 MG tablet, Take 1 tablet by mouth Daily As Needed (edema). (Patient taking differently: Take 1 tablet by mouth As Needed (edema).), Disp: 60 tablet, Rfl: 1    Multiple Vitamin (MULTIVITAMIN) capsule, Take 1 capsule by mouth Daily., Disp: , Rfl:     olmesartan (BENICAR) 40 MG tablet, Take 1 tablet by mouth Daily., Disp: 90 tablet, Rfl: 1    Probiotic Product (PRO-BIOTIC BLEND) capsule, Take  by mouth Daily., Disp: , Rfl:     propranolol (INDERAL) 60 MG tablet, TAKE ONE TABLET BY MOUTH TWICE A DAY, Disp: 180 tablet, Rfl: 3         Objective:     Vitals:    12/18/24 1515  "  BP: 130/78   Patient Position: Sitting   Cuff Size: Adult   Pulse: 64   SpO2: 97%   Weight: 80.3 kg (177 lb)   Height: 165.1 cm (65\")     Body mass index is 29.45 kg/m².    PHYSICAL EXAM:    Vitals Reviewed.   General Appearance: No acute distress, well developed and well nourished.    Eyes: Eye deviation.  HENT: No hearing loss noted.    Respiratory: No signs of respiratory distress. Respiration rhythm and depth normal.  Clear to auscultation.   Cardiovascular:  Jugular Venous Pressure: Normal  Heart Rate and Rhythm: Normal, Heart Sounds: Normal S1 and S2. No S3 or S4 noted.  Murmurs: No murmurs noted. No rubs, thrills, or gallops.   Lower Extremities: No edema noted.  Musculoskeletal: Normal movement of extremities.  Skin: General appearance normal.    Psychiatric: Patient alert and oriented to person, place, and time. Speech and behavior appropriate. Normal mood and affect.       ECG 12 Lead    Date/Time: 12/18/2024 3:09 PM  Performed by: Natty Lang APRN    Authorized by: Natty Lang APRN  Comparison: compared with previous ECG from 12/13/2023  Similar to previous ECG  Rhythm: sinus rhythm  Rate: normal  BPM: 64  Conduction: 1st degree AV block  ST Segments: ST segments normal  T inversion: V1  T flattening: V2  QRS axis: normal  Other findings: T wave abnormality    Clinical impression: non-specific ECG            Assessment:       Diagnosis Plan   1. Coronary artery calcification        2. Bicuspid aortic valve        3. Ascending aorta dilation        4. Carotid artery plaque, bilateral        5. First degree AV block        6. Essential hypertension        7. Mixed hyperlipidemia               Plan:       1.  Coronary artery calcification diagnosed per CT calcium score in February 2017.  Total score 659.  Myocardial perfusion studies have been normal.  Denies angina.  Risk factor modification discussed.  Continue aspirin and atorvastatin.    2.  Possible bicuspid aortic valve noted in the " past.    3.  Ascending aorta dilation measured 4.0 cm last year and 3.9 cm this year.  Stable.  Recheck echocardiogram in 2026.    4.  Bilateral carotid artery plaque.  Recheck carotid duplex in 2026.    5.  First-degree AV block noted on EKG.    6.  Hypertension: Blood pressure normal.    7.  Hyperlipidemia: Remains on atorvastatin.    8.  We discussed the importance of risk factor modification.  Follow-up with Dr. Mcmahon in 1 year.    As always, it has been a pleasure to participate in your patient's care. Thank you.         Sincerely,         RACHELE Hogue  [facility] Cardiology      Dictated utilizing Dragon Dictation

## 2025-01-10 PROBLEM — J01.00 ACUTE NON-RECURRENT MAXILLARY SINUSITIS: Status: ACTIVE | Noted: 2025-01-10

## 2025-01-10 PROBLEM — H10.33 ACUTE BACTERIAL CONJUNCTIVITIS OF BOTH EYES: Status: ACTIVE | Noted: 2025-01-10

## 2025-01-10 PROBLEM — R05.1 ACUTE COUGH: Status: ACTIVE | Noted: 2025-01-10

## 2025-01-14 ENCOUNTER — OFFICE VISIT (OUTPATIENT)
Dept: INTERNAL MEDICINE | Facility: CLINIC | Age: 75
End: 2025-01-14
Payer: MEDICARE

## 2025-01-14 VITALS
HEIGHT: 65 IN | SYSTOLIC BLOOD PRESSURE: 110 MMHG | RESPIRATION RATE: 18 BRPM | OXYGEN SATURATION: 95 % | HEART RATE: 64 BPM | DIASTOLIC BLOOD PRESSURE: 64 MMHG | TEMPERATURE: 97.9 F | BODY MASS INDEX: 29.2 KG/M2 | WEIGHT: 175.3 LBS

## 2025-01-14 DIAGNOSIS — I10 BENIGN ESSENTIAL HYPERTENSION: ICD-10-CM

## 2025-01-14 DIAGNOSIS — K58.9 IRRITABLE BOWEL SYNDROME, UNSPECIFIED TYPE: ICD-10-CM

## 2025-01-14 DIAGNOSIS — J01.00 ACUTE NON-RECURRENT MAXILLARY SINUSITIS: Primary | ICD-10-CM

## 2025-01-14 PROCEDURE — 3078F DIAST BP <80 MM HG: CPT | Performed by: STUDENT IN AN ORGANIZED HEALTH CARE EDUCATION/TRAINING PROGRAM

## 2025-01-14 PROCEDURE — 1126F AMNT PAIN NOTED NONE PRSNT: CPT | Performed by: STUDENT IN AN ORGANIZED HEALTH CARE EDUCATION/TRAINING PROGRAM

## 2025-01-14 PROCEDURE — 1159F MED LIST DOCD IN RCRD: CPT | Performed by: STUDENT IN AN ORGANIZED HEALTH CARE EDUCATION/TRAINING PROGRAM

## 2025-01-14 PROCEDURE — 1160F RVW MEDS BY RX/DR IN RCRD: CPT | Performed by: STUDENT IN AN ORGANIZED HEALTH CARE EDUCATION/TRAINING PROGRAM

## 2025-01-14 PROCEDURE — 99213 OFFICE O/P EST LOW 20 MIN: CPT | Performed by: STUDENT IN AN ORGANIZED HEALTH CARE EDUCATION/TRAINING PROGRAM

## 2025-01-14 PROCEDURE — 3074F SYST BP LT 130 MM HG: CPT | Performed by: STUDENT IN AN ORGANIZED HEALTH CARE EDUCATION/TRAINING PROGRAM

## 2025-01-14 RX ORDER — DICYCLOMINE HYDROCHLORIDE 10 MG/1
10 CAPSULE ORAL 3 TIMES DAILY PRN
Qty: 15 CAPSULE | Refills: 0 | Status: SHIPPED | OUTPATIENT
Start: 2025-01-14

## 2025-01-14 RX ORDER — DOXYCYCLINE 100 MG/1
100 CAPSULE ORAL 2 TIMES DAILY
Qty: 20 CAPSULE | Refills: 0 | Status: SHIPPED | OUTPATIENT
Start: 2025-01-14 | End: 2025-01-24

## 2025-01-14 RX ORDER — PROPRANOLOL HYDROCHLORIDE 60 MG/1
60 TABLET ORAL 2 TIMES DAILY
Qty: 180 TABLET | Refills: 3 | Status: SHIPPED | OUTPATIENT
Start: 2025-01-14

## 2025-01-14 NOTE — TELEPHONE ENCOUNTER
Caller: Page Jana VICKERS    Relationship: Self    Best call back number: [phone]     Requested Prescriptions:   Requested Prescriptions     Pending Prescriptions Disp Refills    propranolol (INDERAL) 60 MG tablet 180 tablet 3     Sig: Take 1 tablet by mouth 2 (Two) Times a Day.        Pharmacy where request should be sent: [facility] [id number] [place] [address] AT [facility]  - [phone] [facility] [phone] FX     Last office visit with prescribing clinician: 12/16/2024   Last telemedicine visit with prescribing clinician: Visit date not found   Next office visit with prescribing clinician: 1/14/2025     Additional details provided by patient:     Does the patient have less than a 3 day supply:  [x] Yes  [] No    Would you like a call back once the refill request has been completed: [] Yes [x] No    If the office needs to give you a call back, can they leave a voicemail: [] Yes [x] No    Mignon Brock Rep   01/14/25 08:53 EST

## 2025-01-14 NOTE — PROGRESS NOTES
"Chief Complaint  Nasal Congestion (X 1 week with mucus yellowish-green thick/OTC mucinex, was given prednisone helped but not gone)    Subjective        Jana Page presents to [facility]  History of Present Illness  She is here for follow-up regarding this the urgent care 4 days ago for URI.  Prescribed doxycycline for 10-day course.  There was some confusion regarding medication so this was not obtained.  Also given a Medrol Dosepak and eyedrops for suspected pinkeye.  Reports the eyedrops been greatly helpful for the pinkeye which was worse in the right eye.  Medrol Dosepak was also helpful for her cough and congestion but she still has been having a fair amount of this.  Reports typically when she gets a sinus infection she ultimately requires an antibiotic.    Objective   Vital Signs:  /64 (BP Location: Left arm, Patient Position: Sitting, Cuff Size: Adult)   Pulse 64   Temp 97.9 °F (36.6 °C) (Oral)   Resp 18   Ht 165.1 cm (65\")   Wt 79.5 kg (175 lb 4.8 oz)   SpO2 95%   BMI 29.17 kg/m²   Estimated body mass index is 29.17 kg/m² as calculated from the following:    Height as of this encounter: 165.1 cm (65\").    Weight as of this encounter: 79.5 kg (175 lb 4.8 oz).          Physical Exam  Vitals reviewed.   HENT:      Nose: Congestion and rhinorrhea present.   Eyes:      General:         Right eye: No discharge.         Left eye: No discharge.      Conjunctiva/sclera:      Left eye: Left conjunctiva is not injected.      Comments: Minimal right conjunctival injection   Pulmonary:      Effort: Pulmonary effort is normal. No respiratory distress.      Breath sounds: No wheezing.   Neurological:      Mental Status: She is alert and oriented to person, place, and time.   Psychiatric:         Mood and Affect: Mood normal.         Thought Content: Thought content normal.         Judgment: Judgment normal.        Result Review :          Pt also needs a refill on the " dicyclomine.     Assessment and Plan   Diagnoses and all orders for this visit:    1. Acute non-recurrent maxillary sinusitis (Primary)  -     doxycycline (MONODOX) 100 MG capsule; Take 1 capsule by mouth 2 (Two) Times a Day for 10 days.  Dispense: 20 capsule; Refill: 0    2. Irritable bowel syndrome, unspecified type  -     dicyclomine (BENTYL) 10 MG capsule; Take 1 capsule by mouth 3 (Three) Times a Day As Needed (Abdominal pain).  Dispense: 15 capsule; Refill: 0    Patient felt comfortable with the plan of care; all questions and concerns addressed.  Advised patient to return for new or worsening symptoms including fever or shortness of breath.         Follow Up   Return in about 27 days (around 2/10/2025) for Medicare Wellness.  Patient was given instructions and counseling regarding her condition or for health maintenance advice. Please see specific information pulled into the AVS if appropriate.

## 2025-01-21 ENCOUNTER — TELEPHONE (OUTPATIENT)
Dept: INTERNAL MEDICINE | Facility: CLINIC | Age: 75
End: 2025-01-21

## 2025-01-21 NOTE — TELEPHONE ENCOUNTER
PATIENT WAS SEEN ON 1/14/25. WAS GIVEN ANTIBIOTIC AND WHICH MAY HAVE CAUSED DIARRHEA. SHE HAS STOPPED TAKING THE MEDICATION. SHE STATES SHE FEELS BETTER BUT STILL COUGHING. SHE FEELS BETTER FOR A FEW DAYS AND IT COMES  BACK . SHE IS WANTING TO KNOW IF THERE IS A MEDICATION FOR COUGH, THAT IS NOT OVER THE COUNTER    [facility] [id number] - [place], [place] - [facility] [address] AT [facility] [address] - [phone]  - [phone]  [phone]     CALL BACK NUMBER [phone]

## 2025-01-22 DIAGNOSIS — J01.00 ACUTE NON-RECURRENT MAXILLARY SINUSITIS: Primary | ICD-10-CM

## 2025-01-22 RX ORDER — BENZONATATE 100 MG/1
100 CAPSULE ORAL 3 TIMES DAILY PRN
Qty: 21 CAPSULE | Refills: 0 | Status: SHIPPED | OUTPATIENT
Start: 2025-01-22 | End: 2025-01-25

## 2025-01-25 PROBLEM — R29.3 ABNORMAL POSTURE: Status: ACTIVE | Noted: 2023-12-06

## 2025-01-25 PROBLEM — M41.50 SCOLIOSIS DUE TO DEGENERATIVE DISEASE OF SPINE IN ADULT PATIENT: Status: ACTIVE | Noted: 2024-06-13

## 2025-01-25 PROBLEM — M51.369 DEGENERATION OF LUMBAR INTERVERTEBRAL DISC: Status: ACTIVE | Noted: 2023-03-06

## 2025-01-25 PROBLEM — M48.061 SPINAL STENOSIS OF LUMBAR REGION: Status: ACTIVE | Noted: 2023-03-06

## 2025-01-25 PROBLEM — M54.16 LUMBAR RADICULOPATHY: Status: ACTIVE | Noted: 2023-03-06

## 2025-01-25 PROBLEM — M43.8X9: Status: ACTIVE | Noted: 2024-08-08

## 2025-01-25 PROBLEM — M41.9 SCOLIOSIS OF LUMBAR SPINE: Status: ACTIVE | Noted: 2023-12-06

## 2025-01-25 PROBLEM — M47.20 RADICULOPATHY DUE TO SPONDYLOSIS: Status: ACTIVE | Noted: 2023-03-06

## 2025-01-25 PROBLEM — G89.29 CHRONIC PAIN: Status: ACTIVE | Noted: 2024-05-09

## 2025-01-25 PROBLEM — M51.16 RADICULOPATHY DUE TO LUMBAR INTERVERTEBRAL DISC DISORDER: Status: ACTIVE | Noted: 2023-03-06

## 2025-01-25 PROBLEM — M62.81 MUSCLE WEAKNESS: Status: ACTIVE | Noted: 2023-12-06

## 2025-01-25 PROBLEM — M47.816 LUMBAR SPONDYLOSIS: Status: ACTIVE | Noted: 2023-03-06

## 2025-01-25 PROBLEM — E66.3 OVERWEIGHT: Status: ACTIVE | Noted: 2023-03-06

## 2025-01-31 DIAGNOSIS — R73.03 PREDIABETES: ICD-10-CM

## 2025-01-31 DIAGNOSIS — I10 ESSENTIAL HYPERTENSION: Chronic | ICD-10-CM

## 2025-01-31 DIAGNOSIS — E78.2 MIXED HYPERLIPIDEMIA: Chronic | ICD-10-CM

## 2025-02-03 ENCOUNTER — APPOINTMENT (OUTPATIENT)
Dept: WOMENS IMAGING | Facility: HOSPITAL | Age: 75
End: 2025-02-03
Payer: MEDICARE

## 2025-02-03 LAB
ALBUMIN SERPL-MCNC: 4.4 G/DL (ref 3.5–5.2)
ALBUMIN/GLOB SERPL: 1.9 G/DL
ALP SERPL-CCNC: 91 U/L (ref 39–117)
ALT SERPL-CCNC: 24 U/L (ref 1–33)
AST SERPL-CCNC: 19 U/L (ref 1–32)
BASOPHILS # BLD AUTO: 0.05 10*3/MM3 (ref 0–0.2)
BASOPHILS NFR BLD AUTO: 0.6 % (ref 0–1.5)
BILIRUB SERPL-MCNC: 0.5 MG/DL (ref 0–1.2)
BUN SERPL-MCNC: 15 MG/DL (ref 8–23)
BUN/CREAT SERPL: 17.6 (ref 7–25)
CALCIUM SERPL-MCNC: 9.7 MG/DL (ref 8.6–10.5)
CHLORIDE SERPL-SCNC: 104 MMOL/L (ref 98–107)
CHOLEST SERPL-MCNC: 168 MG/DL (ref 0–200)
CHOLEST/HDLC SERPL: 3.43 {RATIO}
CO2 SERPL-SCNC: 26.1 MMOL/L (ref 22–29)
CREAT SERPL-MCNC: 0.85 MG/DL (ref 0.57–1)
EGFRCR SERPLBLD CKD-EPI 2021: 72 ML/MIN/1.73
EOSINOPHIL # BLD AUTO: 0.39 10*3/MM3 (ref 0–0.4)
EOSINOPHIL NFR BLD AUTO: 4.7 % (ref 0.3–6.2)
ERYTHROCYTE [DISTWIDTH] IN BLOOD BY AUTOMATED COUNT: 12.9 % (ref 12.3–15.4)
GLOBULIN SER CALC-MCNC: 2.3 GM/DL
GLUCOSE SERPL-MCNC: 121 MG/DL (ref 65–99)
HBA1C MFR BLD: 6.1 % (ref 4.8–5.6)
HCT VFR BLD AUTO: 40.9 % (ref 34–46.6)
HDLC SERPL-MCNC: 49 MG/DL (ref 40–60)
HGB BLD-MCNC: 14.2 G/DL (ref 12–15.9)
IMM GRANULOCYTES # BLD AUTO: 0.02 10*3/MM3 (ref 0–0.05)
IMM GRANULOCYTES NFR BLD AUTO: 0.2 % (ref 0–0.5)
LDLC SERPL CALC-MCNC: 92 MG/DL (ref 0–100)
LYMPHOCYTES # BLD AUTO: 1.8 10*3/MM3 (ref 0.7–3.1)
LYMPHOCYTES NFR BLD AUTO: 21.6 % (ref 19.6–45.3)
MCH RBC QN AUTO: 30.5 PG (ref 26.6–33)
MCHC RBC AUTO-ENTMCNC: 34.7 G/DL (ref 31.5–35.7)
MCV RBC AUTO: 88 FL (ref 79–97)
MONOCYTES # BLD AUTO: 0.61 10*3/MM3 (ref 0.1–0.9)
MONOCYTES NFR BLD AUTO: 7.3 % (ref 5–12)
NEUTROPHILS # BLD AUTO: 5.48 10*3/MM3 (ref 1.7–7)
NEUTROPHILS NFR BLD AUTO: 65.6 % (ref 42.7–76)
NRBC BLD AUTO-RTO: 0 /100 WBC (ref 0–0.2)
PLATELET # BLD AUTO: 341 10*3/MM3 (ref 140–450)
POTASSIUM SERPL-SCNC: 4.2 MMOL/L (ref 3.5–5.2)
PROT SERPL-MCNC: 6.7 G/DL (ref 6–8.5)
RBC # BLD AUTO: 4.65 10*6/MM3 (ref 3.77–5.28)
SODIUM SERPL-SCNC: 142 MMOL/L (ref 136–145)
TRIGL SERPL-MCNC: 154 MG/DL (ref 0–150)
VLDLC SERPL CALC-MCNC: 27 MG/DL (ref 5–40)
WBC # BLD AUTO: 8.35 10*3/MM3 (ref 3.4–10.8)

## 2025-02-03 PROCEDURE — 77063 BREAST TOMOSYNTHESIS BI: CPT | Performed by: RADIOLOGY

## 2025-02-03 PROCEDURE — 77067 SCR MAMMO BI INCL CAD: CPT | Performed by: RADIOLOGY

## 2025-02-10 ENCOUNTER — OFFICE VISIT (OUTPATIENT)
Dept: INTERNAL MEDICINE | Facility: CLINIC | Age: 75
End: 2025-02-10
Payer: MEDICARE

## 2025-02-10 VITALS
HEART RATE: 64 BPM | BODY MASS INDEX: 26.57 KG/M2 | SYSTOLIC BLOOD PRESSURE: 124 MMHG | RESPIRATION RATE: 18 BRPM | DIASTOLIC BLOOD PRESSURE: 78 MMHG | WEIGHT: 165.3 LBS | HEIGHT: 66 IN | OXYGEN SATURATION: 95 %

## 2025-02-10 DIAGNOSIS — I10 ESSENTIAL HYPERTENSION: Chronic | ICD-10-CM

## 2025-02-10 DIAGNOSIS — E55.9 VITAMIN D DEFICIENCY: ICD-10-CM

## 2025-02-10 DIAGNOSIS — Z00.00 MEDICARE ANNUAL WELLNESS VISIT, SUBSEQUENT: Primary | ICD-10-CM

## 2025-02-10 DIAGNOSIS — R73.03 PREDIABETES: Chronic | ICD-10-CM

## 2025-02-10 DIAGNOSIS — E78.2 MIXED HYPERLIPIDEMIA: Chronic | ICD-10-CM

## 2025-02-10 PROCEDURE — 99213 OFFICE O/P EST LOW 20 MIN: CPT | Performed by: STUDENT IN AN ORGANIZED HEALTH CARE EDUCATION/TRAINING PROGRAM

## 2025-02-10 PROCEDURE — G0439 PPPS, SUBSEQ VISIT: HCPCS | Performed by: STUDENT IN AN ORGANIZED HEALTH CARE EDUCATION/TRAINING PROGRAM

## 2025-02-10 PROCEDURE — 3074F SYST BP LT 130 MM HG: CPT | Performed by: STUDENT IN AN ORGANIZED HEALTH CARE EDUCATION/TRAINING PROGRAM

## 2025-02-10 PROCEDURE — 1125F AMNT PAIN NOTED PAIN PRSNT: CPT | Performed by: STUDENT IN AN ORGANIZED HEALTH CARE EDUCATION/TRAINING PROGRAM

## 2025-02-10 PROCEDURE — 3078F DIAST BP <80 MM HG: CPT | Performed by: STUDENT IN AN ORGANIZED HEALTH CARE EDUCATION/TRAINING PROGRAM

## 2025-02-10 NOTE — PROGRESS NOTES
Subjective   The ABCs of the Annual Wellness Visit  Medicare Wellness Visit      Jana Page is a 74 y.o. patient who presents for a Medicare Wellness Visit.    The following portions of the patient's history were reviewed and   updated as appropriate: current medications, past family history, past medical history, past surgical history, and problem list.    Compared to one year ago, the patient's physical   health is the same.  Compared to one year ago, the patient's mental   health is the same.    Recent Hospitalizations:  She was not admitted to the hospital during the last year.     Current Medical Providers:  Patient Care Team:  Neisha Mccabe MD as PCP - General (Internal Medicine)  Jd Mcmahon MD as Cardiologist (Cardiology)  Janiya Alford MD as Consulting Physician (Dermatology)  Sabino Jeff MD as Surgeon (Hand Surgery)  Jasiel Mena MD as Consulting Physician (Ophthalmology)    Outpatient Medications Prior to Visit   Medication Sig Dispense Refill    Accu-Chek FastClix Lancets misc Use to check blood sugar once daily 100 each 2    amLODIPine (NORVASC) 5 MG tablet TAKE ONE TABLET BY MOUTH DAILY 90 tablet 4    aspirin 81 MG chewable tablet Chew 1 tablet Daily.      atorvastatin (LIPITOR) 20 MG tablet Take 1 tablet by mouth Daily. 90 tablet 1    Cholecalciferol (VITAMIN D) 1000 UNITS tablet Take  by mouth.      Coenzyme Q10 (CO Q 10) 10 MG capsule Take  by mouth Daily.      dicyclomine (BENTYL) 10 MG capsule Take 1 capsule by mouth 3 (Three) Times a Day As Needed (Abdominal pain). 15 capsule 0    doxycycline (MONODOX) 100 MG capsule       DULoxetine (CYMBALTA) 60 MG capsule Take 1 capsule by mouth Daily. 90 capsule 1    fluticasone (FLONASE) 50 MCG/ACT nasal spray Administer 2 sprays into the nostril(s) as directed by provider Daily. Administer 2 sprays in each nostril for each dose.      furosemide (LASIX) 20 MG tablet Take 1 tablet by mouth Daily As Needed (edema). 60 tablet 1     methylPREDNISolone (MEDROL) 4 MG dose pack Take as directed on package instructions. 21 tablet 0    Multiple Vitamin (MULTIVITAMIN) capsule Take 1 capsule by mouth Daily.      olmesartan (BENICAR) 40 MG tablet Take 1 tablet by mouth Daily. 90 tablet 1    Probiotic Product (PRO-BIOTIC BLEND) capsule Take  by mouth Daily.      propranolol (INDERAL) 60 MG tablet Take 1 tablet by mouth 2 (Two) Times a Day. 180 tablet 3    trimethoprim-polymyxin b (POLYTRIM) 92428-2.1 UNIT/ML-% ophthalmic solution        No facility-administered medications prior to visit.     No opioid medication identified on active medication list. I have reviewed chart for other potential  high risk medication/s and harmful drug interactions in the elderly.      Aspirin is on active medication list. Aspirin use is indicated based on review of current medical condition/s. Pros and cons of this therapy have been discussed today. Benefits of this medication outweigh potential harm.  Patient has been encouraged to continue taking this medication.  .      Patient Active Problem List   Diagnosis    Disc disorder of cervical region    Degeneration of intervertebral disc of lumbar region    Steatosis of liver    Fibrocystic breast changes    Osteoarthritis of knee    Osteopenia    Prediabetes    Scoliosis    Lipoma of left lower extremity    Family history of premature coronary artery disease    Generalized anxiety disorder    Arthralgia of metacarpophalangeal joint, right    Essential hypertension    Fibroids    Other idiopathic scoliosis, lumbar region    Hot flash not due to menopause    Persistent headaches    Mixed hyperlipidemia    Bicuspid aortic valve    Cerebrovascular small vessel disease    Carotid artery plaque, bilateral    Coronary artery calcification    Ascending aorta dilation    Acute non-recurrent maxillary sinusitis    Acute cough    Acute bacterial conjunctivitis of both eyes    Abnormal posture    Chronic pain    Coronal plane  "imbalance    Muscle weakness    Overweight    Radiculopathy due to spondylosis    Scoliosis due to degenerative disease of spine in adult patient    Spinal stenosis of lumbar region    Degeneration of lumbar intervertebral disc    Lumbar radiculopathy    Lumbar spondylosis    Radiculopathy due to lumbar intervertebral disc disorder    Scoliosis of lumbar spine     Advance Care Planning Advance Directive is not on file.  ACP discussion was held with the patient during this visit. Patient does not have an advance directive, declines further assistance.          Objective   Vitals:    02/10/25 1543   BP: 124/78   BP Location: Left arm   Patient Position: Sitting   Cuff Size: Adult   Pulse: 64   Resp: 18   SpO2: 95%   Weight: 75 kg (165 lb 4.8 oz)   Height: 167.6 cm (65.98\")   PainSc:   5   PainLoc: Back       Estimated body mass index is 26.69 kg/m² as calculated from the following:    Height as of this encounter: 167.6 cm (65.98\").    Weight as of this encounter: 75 kg (165 lb 4.8 oz).    BMI is >= 25 and <30. (Overweight) The following options were offered after discussion;: nutrition counseling/recommendations           Does the patient have evidence of cognitive impairment? No  Lab Results   Component Value Date    CHLPL 168 2025    TRIG 154 (H) 2025    HDL 49 2025    LDL 92 2025    VLDL 27 2025    HGBA1C 6.10 (H) 2025                                                                                                Health  Risk Assessment    Smoking Status:  Social History     Tobacco Use   Smoking Status Former    Current packs/day: 0.00    Average packs/day: 1 pack/day for 10.0 years (10.0 ttl pk-yrs)    Types: Cigarettes    Start date:     Quit date:     Years since quittin.1   Smokeless Tobacco Never     Alcohol Consumption:  Social History     Substance and Sexual Activity   Alcohol Use Yes    Comment: social       Fall Risk Screen  STEADI Fall Risk Assessment was " completed, and patient is at LOW risk for falls.Assessment completed on:2/10/2025    Depression Screening   Little interest or pleasure in doing things? Not at all   Feeling down, depressed, or hopeless? Not at all   PHQ-2 Total Score 0      Health Habits and Functional and Cognitive Screenin/10/2025     3:50 PM   Functional & Cognitive Status   Do you have difficulty preparing food and eating? No   Do you have difficulty bathing yourself, getting dressed or grooming yourself? No   Do you have difficulty using the toilet? No   Do you have difficulty moving around from place to place? No   Do you have trouble with steps or getting out of a bed or a chair? Yes   Current Diet Well Balanced Diet   Dental Exam Up to date   Eye Exam Up to date   Exercise (times per week) 0 times per week   Current Exercises Include No Regular Exercise   Do you need help using the phone?  No   Are you deaf or do you have serious difficulty hearing?  No   Do you need help to go to places out of walking distance? Yes   Do you need help shopping? No   Do you need help preparing meals?  No   Do you need help with housework?  No   Do you need help with laundry? No   Do you need help taking your medications? No   Do you need help managing money? No   Do you ever drive or ride in a car without wearing a seat belt? No   Have you felt unusual stress, anger or loneliness in the last month? No   Who do you live with? Alone   If you need help, do you have trouble finding someone available to you? No   Have you been bothered in the last four weeks by sexual problems? No   Do you have difficulty concentrating, remembering or making decisions? No         Above positive response are regarding chronic low back pain which is worse with activity; pt has seen PT and pain mgmt  Age-appropriate Screening Schedule:  Refer to the list below for future screening recommendations based on patient's age, sex and/or medical conditions. Orders for these  "recommended tests are listed in the plan section. The patient has been provided with a written plan.    Health Maintenance List  Health Maintenance   Topic Date Due    BMI FOLLOWUP  07/11/2024    COVID-19 Vaccine (5 - 2024-25 season) 09/01/2024    MAMMOGRAM  01/30/2025    DXA SCAN  11/27/2025    LIPID PANEL  02/03/2026    ANNUAL WELLNESS VISIT  02/10/2026    TDAP/TD VACCINES (4 - Td or Tdap) 07/08/2029    COLORECTAL CANCER SCREENING  04/26/2032    HEPATITIS C SCREENING  Completed    INFLUENZA VACCINE  Completed    Pneumococcal Vaccine 50+  Completed    ZOSTER VACCINE  Addressed    PAP SMEAR  Discontinued    HEMOGLOBIN A1C  Discontinued                                                                                                                                                CMS Preventative Services Quick Reference  Risk Factors Identified During Encounter  None Identified  Chronic Pain:  follows with PT, has been to pain mgmt    The above risks/problems have been discussed with the patient.  Pertinent information has been shared with the patient in the After Visit Summary.  An After Visit Summary and PPPS were made available to the patient.    Follow Up:   Next Medicare Wellness visit to be scheduled in 1 year.         Additional E&M Note during same encounter follows:  Patient has additional, significant, and separately identifiable condition(s)/problem(s) that require work above and beyond the Medicare Wellness Visit     Chief Complaint  Medicare Wellness-subsequent    Subjective    HPI    She is also follow up regarding HTN, prediabetes, hyperlipidemia.   HTN: Well controlled on 5mg amlodipine, olmesartan 40mg, propanolol 60mg bid  Prediabetes - A1C remains in prediabetic range 6.1%  Hyperlipidemia - Stable cholesterol panel on 20 mg atorvastatin          Objective   Vital Signs:  /78 (BP Location: Left arm, Patient Position: Sitting, Cuff Size: Adult)   Pulse 64   Resp 18   Ht 167.6 cm (65.98\")   Wt " 75 kg (165 lb 4.8 oz)   SpO2 95%   BMI 26.69 kg/m²   Physical Exam  Vitals reviewed.   Constitutional:       General: She is not in acute distress.     Appearance: Normal appearance.   Cardiovascular:      Rate and Rhythm: Normal rate.   Pulmonary:      Effort: No respiratory distress.   Neurological:      Mental Status: She is alert and oriented to person, place, and time.   Psychiatric:         Mood and Affect: Mood normal.         Thought Content: Thought content normal.         Judgment: Judgment normal.          The following data was reviewed by: Neisha Mccabe MD on 02/10/2025:    Common labs          4/23/2024    10:35 2/3/2025    09:38   Common Labs   Glucose 125  121    BUN 12  15    Creatinine 0.84  0.85    Sodium 145  142    Potassium 4.2  4.2    Chloride 107  104    Calcium 9.8  9.7    Albumin 4.8  4.4    Total Bilirubin 0.5  0.5    Alkaline Phosphatase 92  91    AST (SGOT) 18  19    ALT (SGPT) 23  24    WBC 7.12  8.35    Hemoglobin 14.4  14.2    Hematocrit 42.8  40.9    Platelets 289  341    Total Cholesterol 177  168    Triglycerides 143  154    HDL Cholesterol 55  49    LDL Cholesterol  97  92    Hemoglobin A1C 5.80  6.10               Assessment and Plan   Diagnoses and all orders for this visit:    1. Medicare annual wellness visit, subsequent (Primary)    2. Prediabetes  -     TSH Rfx On Abnormal To Free T4; Future  -     CBC (No Diff); Future  -     Hemoglobin A1c; Future  -     Comprehensive Metabolic Panel; Future    3. Essential hypertension  -     Hemoglobin A1c; Future  -     Comprehensive Metabolic Panel; Future    4. Mixed hyperlipidemia  -     Comprehensive Metabolic Panel; Future  -     Lipid Panel With / Chol / HDL Ratio; Future    5. Vitamin D deficiency  -     Vitamin D,25-Hydroxy; Future              Follow Up   Return in about 6 months (around 8/10/2025).  Patient was given instructions and counseling regarding her condition or for health maintenance advice. Please see specific  information pulled into the AVS if appropriate.

## 2025-03-04 ENCOUNTER — TELEPHONE (OUTPATIENT)
Dept: INTERNAL MEDICINE | Facility: CLINIC | Age: 75
End: 2025-03-04
Payer: MEDICARE

## 2025-03-04 NOTE — TELEPHONE ENCOUNTER
Caller: Jana Page    Relationship: Self    Best call back number: [phone]    What medication are you requesting:     amLODIPine (NORVASC) 5 MG tablet       What are your current symptoms:     How long have you been experiencing symptoms:     Have you had these symptoms before:    [x] Yes  [] No    Have you been treated for these symptoms before:   [x] Yes  [] No    If a prescription is needed, what is your preferred pharmacy and phone number: [facility] [phone] - [place] [address] AT [facility]. [address]  - [phone] [facility] [phone] FX     Additional notes:PATIENT IS CALLING IN TO REQUEST A MEDICATION REFILL ON HER     amLODIPine (NORVASC) 5 MG tablet   AND THIS WAS NOT PRESCRIBED BY DR HUDSON SO A NEW PRESCRIPTION IS NEEDED IN ORDER FOR THIS TO BE REFILLED. PATIENT WANTS TO BE CALLED TO DISCUSS WHY SHE HAS TO CALL IN EVERY MONTH TO REQUEST A REFILL ON THIS.

## 2025-03-04 NOTE — TELEPHONE ENCOUNTER
LVM stating according to your records, Amlodipine has not been filled since March 2024. Could you be mistaking this med for another one.  Looking at our records, Amlodipine is on a 90 day refill with 3 refills remaining.  You may need to contact the Provider that you're getting it from asking them why you have to call it in every months when it's scheduled for 3 months.  Please contact our office for clarification.

## 2025-03-05 DIAGNOSIS — I10 BENIGN ESSENTIAL HYPERTENSION: ICD-10-CM

## 2025-03-05 RX ORDER — AMLODIPINE BESYLATE 5 MG/1
5 TABLET ORAL DAILY
Qty: 90 TABLET | Refills: 4 | Status: SHIPPED | OUTPATIENT
Start: 2025-03-05

## 2025-03-05 NOTE — TELEPHONE ENCOUNTER
Pt is just wanting a refill on amlodipine and is unsure why its not being filled, pt does not need a 1 year supply. Please call pt

## 2025-03-05 NOTE — TELEPHONE ENCOUNTER
Rx Refill Note  Requested Prescriptions     Pending Prescriptions Disp Refills    amLODIPine (NORVASC) 5 MG tablet 90 tablet 4     Sig: Take 1 tablet by mouth Daily.      Last office visit with prescribing clinician: 2/10/2025   Last telemedicine visit with prescribing clinician: Visit date not found   Next office visit with prescribing clinician: 8/12/2025                         Would you like a call back once the refill request has been completed: [] Yes [] No    If the office needs to give you a call back, can they leave a voicemail: [] Yes [] No    Odin Cruz MA  03/05/25, 09:56 EST

## 2025-03-05 NOTE — TELEPHONE ENCOUNTER
Spoke with Pt, received clarification, Amlodipine is the correct medication but there are no notes in the chart stating that she called regarding a refill, nor have we heard from Pharmacy

## 2025-04-07 ENCOUNTER — TELEPHONE (OUTPATIENT)
Dept: CARDIOLOGY | Age: 75
End: 2025-04-07
Payer: MEDICARE

## 2025-04-07 NOTE — TELEPHONE ENCOUNTER
CARDIOLOGY    Patient Name: Jana Page  :1950  Age: 74 y.o.    25    To whom it may concern:    Jana Page was referred to my office for cardiovascular risk assessment exam for upcoming surgery.    She has a history of coronary artery calcification and mild aortic dilation. She does not have a history of CHF or obstructive CAD. She has frequent premature atrial contractions.    From a cardiovascular standpoint, the patient is at the following risk of major cardiovascular event in the sue-operative setting:  [x] Low         [] Modifiable  [] Low-Moderate       [] Non-Modifiable   [] Moderate  [] Moderare - high  [] High    Cardiac Testing:  [] Needs further cardiac testing  [x] Does not need further cardiac testing    Anticoagulant Status:  [x] No anticoagulants    [] The patient is on  [] ASA; hold for ___ days.  [] Coumadin; hold for ___ days.  [] Plavix; hold for ___ days.  [] Eliquis; hold for ___ days.  [] Brilinta; hold for ___ days.  [] Xarelto; hold for ___ days.  [] Effient; hold for ___ days.    [] The patient requires Lovenox bridging, which has been prescribed.     Beta Blockers:  [] The patient will need pre-op beta blockers which will be prescribed by my clinic.    If there are any problems during surgery, please do not hesitate to contact my office.    Thank you for allowing me to participate in this patient's care.    Sincerely,         Jd Mcmahon MD  [facility]   [facility]  [address] [place] [address]  Office: [phone]    \

## 2025-04-07 NOTE — TELEPHONE ENCOUNTER
Pt is scheduled to have spinal cord stimulator trial, completed with versed and implant completed with TANIYA, with Dr. Sher Tafoya.  They are requesting pre op risk assessment.

## 2025-04-24 DIAGNOSIS — I10 BENIGN ESSENTIAL HYPERTENSION: ICD-10-CM

## 2025-04-24 NOTE — TELEPHONE ENCOUNTER
Caller: Kaylee Jana VICKERS    Relationship: Self    Best call back number: [phone]     Requested Prescriptions:   Requested Prescriptions     Pending Prescriptions Disp Refills    olmesartan (BENICAR) 40 MG tablet 90 tablet 1     Sig: Take 1 tablet by mouth Daily.        Pharmacy where request should be sent: [facility] [id number] [place] [address] [address]  AT [facility]. [address]  - [phone] [facility] [phone] FX     Last office visit with prescribing clinician: 2/10/2025   Last telemedicine visit with prescribing clinician: Visit date not found   Next office visit with prescribing clinician: 8/12/2025     Does the patient have less than a 3 day supply:  [x] Yes  [] No    Would you like a call back once the refill request has been completed: [] Yes [x] No    If the office needs to give you a call back, can they leave a voicemail: [] Yes [x] No    Mignon Lucas Rep   04/24/25 08:21 EDT

## 2025-04-25 RX ORDER — OLMESARTAN MEDOXOMIL 40 MG/1
40 TABLET ORAL DAILY
Qty: 90 TABLET | Refills: 1 | Status: SHIPPED | OUTPATIENT
Start: 2025-04-25

## 2025-05-02 DIAGNOSIS — E78.00 HYPERCHOLESTEROLEMIA: ICD-10-CM

## 2025-05-02 RX ORDER — ATORVASTATIN CALCIUM 20 MG/1
20 TABLET, FILM COATED ORAL DAILY
Qty: 90 TABLET | Refills: 1 | Status: SHIPPED | OUTPATIENT
Start: 2025-05-02

## 2025-05-02 NOTE — TELEPHONE ENCOUNTER
Caller: Jana Page    Relationship: Self    Best call back number: [phone]    What medication are you requesting:     atorvastatin (LIPITOR) 20 MG tablet     LAST PRESCRIBED BY DR MARQUEZ     PATIENT NEEDS REFILL     PATIENT HAS 2 DAYS LEFT OF MEDICATION     If a prescription is needed, what is your preferred pharmacy and phone number:  [facility] [id number] - [place], [place] - [facility] [address] AT [facility]. [address]  - [phone] [facility] [phone] FX

## 2025-05-02 NOTE — TELEPHONE ENCOUNTER
Rx Refill Note  Requested Prescriptions     Pending Prescriptions Disp Refills    atorvastatin (LIPITOR) 20 MG tablet 90 tablet 1     Sig: Take 1 tablet by mouth Daily.      Last office visit with prescribing clinician: 2/10/2025   Last telemedicine visit with prescribing clinician: Visit date not found   Next office visit with prescribing clinician: 8/12/2025                         Would you like a call back once the refill request has been completed: [] Yes [] No    If the office needs to give you a call back, can they leave a voicemail: [] Yes [] No    Odin Cruz MA  05/02/25, 15:14 EDT

## 2025-05-15 DIAGNOSIS — R42 LIGHTHEADEDNESS: ICD-10-CM

## 2025-05-15 DIAGNOSIS — F41.9 ANXIETY: ICD-10-CM

## 2025-05-15 RX ORDER — DULOXETIN HYDROCHLORIDE 60 MG/1
60 CAPSULE, DELAYED RELEASE ORAL DAILY
Qty: 90 CAPSULE | Refills: 1 | Status: SHIPPED | OUTPATIENT
Start: 2025-05-15

## (undated) DEVICE — SENSR O2 OXIMAX FNGR A/ 18IN NONSTR

## (undated) DEVICE — KT ORCA ORCAPOD DISP STRL

## (undated) DEVICE — TUBING, SUCTION, 1/4" X 10', STRAIGHT: Brand: MEDLINE

## (undated) DEVICE — LN SMPL CO2 SHTRM SD STREAM W/M LUER

## (undated) DEVICE — ADAPT CLN BIOGUARD AIR/H2O DISP

## (undated) DEVICE — CANN O2 ETCO2 FITS ALL CONN CO2 SMPL A/ 7IN DISP LF